# Patient Record
Sex: FEMALE | Race: WHITE | Employment: OTHER | URBAN - METROPOLITAN AREA
[De-identification: names, ages, dates, MRNs, and addresses within clinical notes are randomized per-mention and may not be internally consistent; named-entity substitution may affect disease eponyms.]

---

## 2017-01-30 DIAGNOSIS — I10 ESSENTIAL HYPERTENSION, BENIGN: Primary | ICD-10-CM

## 2017-01-30 RX ORDER — LOSARTAN POTASSIUM AND HYDROCHLOROTHIAZIDE 25; 100 MG/1; MG/1
1 TABLET ORAL DAILY
Qty: 90 TAB | Refills: 1 | Status: SHIPPED | OUTPATIENT
Start: 2017-01-30 | End: 2017-07-14 | Stop reason: SDUPTHER

## 2017-02-22 RX ORDER — METFORMIN HYDROCHLORIDE 500 MG/1
500 TABLET ORAL 2 TIMES DAILY WITH MEALS
Qty: 60 TAB | Refills: 2 | Status: SHIPPED | OUTPATIENT
Start: 2017-02-22 | End: 2017-05-23

## 2017-03-16 ENCOUNTER — OFFICE VISIT (OUTPATIENT)
Dept: SLEEP MEDICINE | Age: 67
End: 2017-03-16

## 2017-03-16 VITALS
WEIGHT: 199 LBS | DIASTOLIC BLOOD PRESSURE: 93 MMHG | HEIGHT: 64 IN | OXYGEN SATURATION: 97 % | SYSTOLIC BLOOD PRESSURE: 158 MMHG | BODY MASS INDEX: 33.97 KG/M2 | HEART RATE: 68 BPM

## 2017-03-16 DIAGNOSIS — R63.5 WEIGHT GAIN: ICD-10-CM

## 2017-03-16 DIAGNOSIS — G47.33 OSA (OBSTRUCTIVE SLEEP APNEA): Primary | ICD-10-CM

## 2017-03-16 DIAGNOSIS — I10 ESSENTIAL HYPERTENSION, BENIGN: ICD-10-CM

## 2017-03-16 NOTE — PROGRESS NOTES
[address] [place]. [place], [address]  Tel.  [phone]  Fax. 100 [facility] 60  [place], [address] [place]  Tel.  [phone]  Fax. [phone] [address] [facility] 3 Mushtaq Ruiz   Tel.  [phone]  Fax. [phone]     S>Cira Perez is a 77 y.o. female seen for a positive airway pressure follow-up. She reports no problems using the device. She is 100% compliant over the past 30 days. The following problems are identified:    Drowsiness no Problems exhaling no   Snoring no Forget to put on no   Mask Comfortable yes Can't fall asleep no   Dry Mouth no Mask falls off no   Air Leaking no Frequent awakenings yes       She admits that her sleep has worsened she has been waking up to use the restroom and then has difficulty returning to sleep. She has gained weight in the past year. Allergies   Allergen Reactions    Codeine Nausea Only     GI    Macrodantin [Nitrofurantoin Macrocrystalline] Itching    Tenoretic 100 [Atenolol-Chlorthalidone] Other (comments)     fatigue    Zocor [Simvastatin] Swelling       She has a current medication list which includes the following prescription(s): metformin, losartan-hydrochlorothiazide, red yeast rice extract, cholecalciferol, cranberry conc/ascorbic acid, multivitamin, stress formula, flaxseed oil, calcium, and docosahexanoic acid/epa. .      She  has a past medical history of Esophageal reflux (4/13/2010); Essential hypertension, benign (4/13/2010); Mixed hyperlipidemia (4/13/2010); Obesity, unspecified (4/13/2010); and CINTHIA (obstructive sleep apnea) (08-). [place] Sleepiness Score: 2   and Modified F.O.S.Q. Score Total / 2: 18.5   which reflect improved sleep quality over therapy time.     O>    Visit Vitals    BP (!) 158/93    Pulse 68    Ht 5' 4\" (1.626 m)    Wt 199 lb (90.3 kg)    SpO2 97%    BMI 34.16 kg/m2         General:   Not in acute distress   Eyes:  Anicteric sclerae, no obvious strabismus   Nose:  No obvious nasal septum deviation    Oropharynx:   Class 4 oropharyngeal outlet, thick tongue base, uvula not seen due to low-lying soft palate, narrow tonsilo-pharyngeal pilars   Tonsils:   tonsils are not visualized due to low-lying soft palate   Neck:   midline trachea   Chest/Lungs:  Equal lung expansion, clear on auscultation    CVS:  Normal rate, regular rhythm; no JVD   Skin:  Warm to touch; no obvious rashes   Neuro:  No focal deficits ; no obvious tremor    Psych:  Normal affect,  normal countenance;           A>    ICD-10-CM ICD-9-CM    1. CINTHIA (obstructive sleep apnea) G47.33 327.23 SPLIT CPAP/PSG   2. Essential hypertension, benign I10 401.1    3. BMI 34.0-34.9,adult Z68.34 V85.34    4. Weight gain R63.5 783.1         AHI = 5.6. On CPAP : 9 cmH2O. Compliant:      yes    Therapeutic Response:  Negative    P>  * Sleep testing due to interim weight gain. * We have recommended a dedicated weight loss through appropriate diet and an exercise regiment as significant weight reduction has been shown to reduce severity of obstructive sleep apnea. * Follow-up Disposition:  Return if symptoms worsen or fail to improve. New device to be prescribed after testing. * She was asked to contact our office for any problems regarding PAP therapy. * Counseling was provided regarding the importance of regular PAP use and on proper sleep hygiene and safe driving. * Re-enforced proper and regular cleaning for the device. Thank you for allowing us to participate in your patient's medical care. Quintin Jones MD, FAASM  Diplomate American Board of Sleep Medicine  Diplomate in Sleep Medicine - ABP  Electronically signed.

## 2017-03-16 NOTE — PATIENT INSTRUCTIONS
[address] [facility] [place]. [place], [address]  Tel.  [phone]  Fax. 100 [facility] 60  [place], [address] [facility] [place]  Tel.  [phone]  Fax. [phone] [address] Mushtaq Ruiz  Tel.  [phone]  Fax. [phone]     Learning About CPAP for Sleep Apnea  What is CPAP? CPAP is a small machine that you use at home every night while you sleep. It increases air pressure in your throat to keep your airway open. When you have sleep apnea, this can help you sleep better so you feel much better. CPAP stands for \"continuous positive airway pressure. \"  The CPAP machine will have one of the following:  · A mask that covers your nose and mouth  · Prongs that fit into your nose  · A mask that covers your nose only, the most common type. This type is called NCPAP. The N stands for \"nasal.\"  Why is it done? CPAP is usually the best treatment for obstructive sleep apnea. It is the first treatment choice and the most widely used. Your doctor may suggest CPAP if you have:  · Moderate to severe sleep apnea. · Sleep apnea and coronary artery disease (CAD) or heart failure. How does it help? · CPAP can help you have more normal sleep, so you feel less sleepy and more alert during the daytime. · CPAP may help keep heart failure or other heart problems from getting worse. · NCPAP may help lower your blood pressure. · If you use CPAP, your bed partner may also sleep better because you are not snoring or restless. What are the side effects? Some people who use CPAP have:  · A dry or stuffy nose and a sore throat. · Irritated skin on the face. · Sore eyes. · Bloating. If you have any of these problems, work with your doctor to fix them. Here are some things you can try:  · Be sure the mask or nasal prongs fit well. · See if your doctor can adjust the pressure of your CPAP. · If your nose is dry, try a humidifier.   · If your nose is runny or stuffy, try decongestant medicine or a steroid nasal spray. If these things do not help, you might try a different type of machine. Some machines have air pressure that adjusts on its own. Others have air pressures that are different when you breathe in than when you breathe out. This may reduce discomfort caused by too much pressure in your nose. Where can you learn more? Go to Circalit.be  Enter Kenny Judge in the search box to learn more about \"Learning About CPAP for Sleep Apnea. \"   © 1739-2452 Healthwise, Incorporated. Care instructions adapted under license by [employer] (which disclaims liability or warranty for this information). This care instruction is for use with your licensed healthcare professional. If you have questions about a medical condition or this instruction, always ask your healthcare professional. [address] any warranty or liability for your use of this information. Content Version: 4.9.43439; Last Revised: January 11, 2010  PROPER SLEEP HYGIENE    What to avoid  · Do not have drinks with caffeine, such as coffee or black tea, for 8 hours before bed. · Do not smoke or use other types of tobacco near bedtime. Nicotine is a stimulant and can keep you awake. · Avoid drinking alcohol late in the evening, because it can cause you to wake in the middle of the night. · Do not eat a big meal close to bedtime. If you are hungry, eat a light snack. · Do not drink a lot of water close to bedtime, because the need to urinate may wake you up during the night. · Do not read or watch TV in bed. Use the bed only for sleeping and sexual activity. What to try  · Go to bed at the same time every night, and wake up at the same time every morning. Do not take naps during the day. · Keep your bedroom quiet, dark, and cool. · Get regular exercise, but not within 3 to 4 hours of your bedtime. .  · Sleep on a comfortable pillow and mattress.   · If watching the clock makes you anxious, turn it facing away from you so you cannot see the time. · If you worry when you lie down, start a worry book. Well before bedtime, write down your worries, and then set the book and your concerns aside. · Try meditation or other relaxation techniques before you go to bed. · If you cannot fall asleep, get up and go to another room until you feel sleepy. Do something relaxing. Repeat your bedtime routine before you go to bed again. · Make your house quiet and calm about an hour before bedtime. Turn down the lights, turn off the TV, log off the computer, and turn down the volume on music. This can help you relax after a busy day. Drowsy Driving: The [facility] 54 cites drowsiness as a causing factor in more than 045,056 police reported crashes annually, resulting in 76,000 injuries and 1,500 deaths. Other surveys suggest 55% of people polled have driven while drowsy in the past year, 23% had fallen asleep but not crashed, 3% crashed, and 2% had and accident due to drowsy driving. Who is at risk? Young Drivers: One study of drowsy driving accidents states that 55% of the drivers were under 25 years. Of those, 75% were male. Shift Workers and Travelers: People who work overnight or travel across time zones frequently are at higher risk of experiencing Circadian Rhythm Disorders. They are trying to work and function when their body is programed to sleep. Sleep Deprived: Lack of sleep has a serious impact on your ability to pay attention or focus on a task. Consistently getting less than the average of 8 hours your body needs creates partial or cumulative sleep deprivation. Untreated Sleep Disorders: Sleep Apnea, Narcolepsy, R.L.S., and other sleep disorders (untreated) prevent a person from getting enough restful sleep. This leads to excessive daytime sleepiness and increases the risk for drowsy driving accidents by up to 7 times.   Medications / Alcohol: Even over the counter medications can cause drowsiness. Medications that impair a drivers attention should have a warning label. Alcohol naturally makes you sleepy and on its own can cause accidents. Combined with excessive drowsiness its effects are amplified. Signs of Drowsy Driving:   * You don't remember driving the last few miles   * You may drift out of your kylah   * You are unable to focus and your thoughts wander   * You may yawn more often than normal   * You have difficulty keeping your eyes open / nodding off   * Missing traffic signs, speeding, or tailgating  Prevention-   Good sleep hygiene, lifestyle and behavioral choices have the most impact on drowsy driving. There is no substitute for sleep and the average person requires 8 hours nightly. If you find yourself driving drowsy, stop and sleep. Consider the sleep hygiene tips provided during your visit as well. Medication Refill Policy: Refills for all medications require 1 week advance notice. Please have your pharmacy fax a refill request. We are unable to fax, or call in \"controled substance\" medications and you will need to pick these prescriptions up from our office. Chippmunk Activation    Thank you for requesting access to Chippmunk. Please follow the instructions below to securely access and download your online medical record. Chippmunk allows you to send messages to your doctor, view your test results, renew your prescriptions, schedule appointments, and more. How Do I Sign Up? 1. In your internet browser, go to [email or web address]. 2. Click on the First Time User? Click Here link in the Sign In box. You will see the New Member Sign Up page. 3. Enter your Chippmunk Access Code exactly as it appears below. You will not need to use this code after youve completed the sign-up process. If you do not sign up before the expiration date, you must request a new code. Chippmunk Access Code:  Activation code not generated  Current Donde Status: Active (This is the date your Donde access code will )    4. Enter the last four digits of your Social Security Number (xxxx) and Date of Birth (mm/dd/yyyy) as indicated and click Submit. You will be taken to the next sign-up page. 5. Create a TickPickt ID. This will be your Donde login ID and cannot be changed, so think of one that is secure and easy to remember. 6. Create a Donde password. You can change your password at any time. 7. Enter your Password Reset Question and Answer. This can be used at a later time if you forget your password. 8. Enter your e-mail address. You will receive e-mail notification when new information is available in [address] 9. Click Sign Up. You can now view and download portions of your medical record. 10. Click the Download Summary menu link to download a portable copy of your medical information. Additional Information    If you have questions, please call [phone]. Remember, Donde is NOT to be used for urgent needs. For medical emergencies, dial 911.

## 2017-06-16 ENCOUNTER — HOSPITAL ENCOUNTER (OUTPATIENT)
Dept: SLEEP MEDICINE | Age: 67
Discharge: HOME | End: 2017-06-16
Payer: MEDICARE

## 2017-06-16 VITALS
SYSTOLIC BLOOD PRESSURE: 160 MMHG | BODY MASS INDEX: 33.97 KG/M2 | DIASTOLIC BLOOD PRESSURE: 99 MMHG | OXYGEN SATURATION: 99 % | TEMPERATURE: 97.3 F | WEIGHT: 199 LBS | HEIGHT: 64 IN | HEART RATE: 62 BPM

## 2017-06-16 DIAGNOSIS — G47.33 OSA (OBSTRUCTIVE SLEEP APNEA): ICD-10-CM

## 2017-06-16 PROCEDURE — 95810 POLYSOM 6/> YRS 4/> PARAM: CPT

## 2017-06-20 ENCOUNTER — TELEPHONE (OUTPATIENT)
Dept: SLEEP MEDICINE | Age: 67
End: 2017-06-20

## 2017-06-20 DIAGNOSIS — G47.33 SLEEP APNEA, OBSTRUCTIVE: Primary | ICD-10-CM

## 2017-06-21 NOTE — TELEPHONE ENCOUNTER
Results of Sleep Testing reviewed with patient who indicated that she had used her CPAP Device on the night prior to sleep testing. He sleep during the test was not restful due to her waking up with snoring and snorting. She did agree to holding off on CPAP therapy for a week prior to a HSAT. Encounter Diagnosis   Name Primary?     Sleep apnea, obstructive Yes     Orders Placed This Encounter    SLEEP STUDY UNATTENDED, 4 CHANNEL     Order Specific Question:   Reason for Exam     Answer:   CINTHIA

## 2017-06-28 NOTE — TELEPHONE ENCOUNTER
Patient said it will be very hard for her to not use CPAP, but she will try so that she can do repeat HST (has been scheduled for 7/6/17.

## 2017-07-06 ENCOUNTER — OFFICE VISIT (OUTPATIENT)
Dept: SLEEP MEDICINE | Age: 67
End: 2017-07-06

## 2017-07-06 ENCOUNTER — HOSPITAL ENCOUNTER (OUTPATIENT)
Dept: SLEEP MEDICINE | Age: 67
Discharge: HOME | End: 2017-07-06
Payer: MEDICARE

## 2017-07-06 VITALS
DIASTOLIC BLOOD PRESSURE: 90 MMHG | HEART RATE: 78 BPM | OXYGEN SATURATION: 96 % | BODY MASS INDEX: 33.97 KG/M2 | SYSTOLIC BLOOD PRESSURE: 160 MMHG | WEIGHT: 199 LBS | HEIGHT: 64 IN

## 2017-07-06 DIAGNOSIS — G47.33 OSA (OBSTRUCTIVE SLEEP APNEA): Primary | ICD-10-CM

## 2017-07-06 PROCEDURE — 95806 SLEEP STUDY UNATT&RESP EFFT: CPT

## 2017-07-06 NOTE — PROGRESS NOTES
[address] [facility] [address] [place]. [place], [address]  Tel.  [phone]  Fax. 100 [facility] 60  [place] [facility], [address]  Tel.  [phone]  Fax. [phone] [address] [facility] [place] [address]  Tel.  [phone]  Fax. [phone]       S>Cira Marin is a 77 y.o. female seen today to receive a home sleep testing unit (HST). · Patient was educated on proper hookup and operation of the HST. · Instruction forms and documentation were reviewed and signed. · The patient demonstrated good understanding of the HST. O>    Visit Vitals    /90    Pulse 78    Ht 5' 4\" (1.626 m)    Wt 199 lb (90.3 kg)    SpO2 96%    BMI 34.16 kg/m2         A>  No diagnosis found. P>  · General information regarding operations and maintenance of the device was provided. · She was provided information on sleep apnea including coresponding risk factors and the importance of proper treatment. · Follow-up appointment was made to return the HST. She will be contacted once the results have been reviewed. · She was asked to contact our office for any problems regarding her home sleep test study.

## 2017-07-07 ENCOUNTER — TELEPHONE (OUTPATIENT)
Dept: SLEEP MEDICINE | Age: 67
End: 2017-07-07

## 2017-07-07 NOTE — TELEPHONE ENCOUNTER
HSAT Returned    Date of Study: 7/6/2017    The following information was gathered from the patients study log:    · Lights off: 9:30 PM  · Estimated sleep onset: 10 PM    · Awakened a total of 3 times  · The patient felt they slept 7 hours  · Patient took sleep aid before starting the test  · Sleep quality was same compared to a usual nights sleep. Further information provided: Not used CPAP for 8 nights.

## 2017-07-07 NOTE — TELEPHONE ENCOUNTER
Results of Sleep Testing reviewed with patient who indicated that she has become very tired and snores a lot since discontinuation of CPAP therapy. She has not had a face to face evaluation since she has been on Medicare and is willing to come in for an evaluation.

## 2017-07-14 ENCOUNTER — OFFICE VISIT (OUTPATIENT)
Dept: INTERNAL MEDICINE CLINIC | Age: 67
End: 2017-07-14

## 2017-07-14 VITALS
HEART RATE: 70 BPM | WEIGHT: 200.2 LBS | RESPIRATION RATE: 15 BRPM | OXYGEN SATURATION: 98 % | SYSTOLIC BLOOD PRESSURE: 136 MMHG | BODY MASS INDEX: 34.18 KG/M2 | DIASTOLIC BLOOD PRESSURE: 76 MMHG | TEMPERATURE: 98.1 F | HEIGHT: 64 IN

## 2017-07-14 DIAGNOSIS — G47.30 INSOMNIA WITH SLEEP APNEA, UNSPECIFIED: ICD-10-CM

## 2017-07-14 DIAGNOSIS — E66.9 OBESITY (BMI 30.0-34.9): ICD-10-CM

## 2017-07-14 DIAGNOSIS — Z12.11 SCREENING FOR COLON CANCER: ICD-10-CM

## 2017-07-14 DIAGNOSIS — G47.00 INSOMNIA WITH SLEEP APNEA, UNSPECIFIED: ICD-10-CM

## 2017-07-14 DIAGNOSIS — I10 ESSENTIAL HYPERTENSION, BENIGN: ICD-10-CM

## 2017-07-14 DIAGNOSIS — E11.9 CONTROLLED TYPE 2 DIABETES MELLITUS WITHOUT COMPLICATION, WITHOUT LONG-TERM CURRENT USE OF INSULIN (HCC): Primary | ICD-10-CM

## 2017-07-14 RX ORDER — METFORMIN HYDROCHLORIDE 500 MG/1
TABLET ORAL
COMMUNITY
Start: 2017-06-19 | End: 2017-07-14 | Stop reason: SDUPTHER

## 2017-07-14 RX ORDER — LOSARTAN POTASSIUM AND HYDROCHLOROTHIAZIDE 25; 100 MG/1; MG/1
1 TABLET ORAL DAILY
Qty: 90 TAB | Refills: 1 | Status: SHIPPED | OUTPATIENT
Start: 2017-07-14 | End: 2018-05-21 | Stop reason: SDUPTHER

## 2017-07-14 RX ORDER — METFORMIN HYDROCHLORIDE 500 MG/1
500 TABLET ORAL
Qty: 90 TAB | Refills: 0 | Status: SHIPPED | OUTPATIENT
Start: 2017-07-14 | End: 2017-09-12 | Stop reason: SDUPTHER

## 2017-07-14 NOTE — PROGRESS NOTES
Chief Complaint   Patient presents with    Hypertension    Diabetes       1. Have you been to the ER, urgent care clinic since your last visit? Hospitalized since your last visit? No    2. Have you seen or consulted any other health care providers outside of the [address] [place] [address] since your last visit? Include any pap smears or colon screening. No    Body mass index is 34.36 kg/(m^2).

## 2017-07-14 NOTE — MR AVS SNAPSHOT
Visit Information Date & Time Provider Department Dept. Phone Encounter #  
 7/14/2017 10:30 AM Mary Avelar MD [facility] [phone] [id number] Your Appointments 8/1/2017  4:40 PM  
Any with Billie Esparza MD  
[address] [facility] ([facility] Appt Note: face to face follow up for Medicare; face to face follow up for Medicare [place] [address] [place] [address]  
  
   
 [address] [facility] [address] 9/13/2017  9:00 AM  
PHYSICAL PRE OP with Mary Avelar MD  
[facility] Appt Note: [employer] [address] [facility] [address] [facility] [address] [address]  
101 [facility] 3100 [facility] [address] [address] Upcoming Health Maintenance Date Due COLONOSCOPY 11/8/1968 GLAUCOMA SCREENING Q2Y 11/8/2015 Pneumococcal 65+ Low/Medium Risk (2 of 2 - PPSV23) 11/8/2015 MEDICARE YEARLY EXAM 11/8/2015 INFLUENZA AGE 9 TO ADULT 8/1/2017 BREAST CANCER SCRN MAMMOGRAM 10/6/2018 DTaP/Tdap/Td series (2 - Td) 8/3/2026 Allergies as of 7/14/2017  Review Complete On: 7/14/2017 By: aMry Avelar MD  
  
 Severity Noted Reaction Type Reactions Codeine  04/13/2010    Nausea Only GI Macrodantin [Nitrofurantoin Macrocrystalline]  10/03/2002    Itching Tenoretic 100 [Atenolol-chlorthalidone]  04/13/2010    Other (comments)  
 fatigue Zocor [Simvastatin]  04/13/2010    Swelling Current Immunizations  Never Reviewed Name Date Tdap 8/3/2016 Not reviewed this visit You Were Diagnosed With   
  
 Codes Comments Controlled type 2 diabetes mellitus without complication, without long-term current use of insulin ([facility]    -  Primary ICD-10-CM: E11.9 ICD-9-CM: 250.00 Essential hypertension, benign     ICD-10-CM: I10 
ICD-9-CM: 401.1 Insomnia with sleep apnea, unspecified     ICD-10-CM: G47.00, G47.30 ICD-9-CM: 780.51 Obesity (BMI 30.0-34.9)     ICD-10-CM: N45.2 ICD-9-CM: 278.00 Screening for colon cancer     ICD-10-CM: Z12.11 ICD-9-CM: V76.51 Vitals BP Pulse Temp Resp Height(growth percentile) Weight(growth percentile) 136/76 (BP 1 Location: Left arm, BP Patient Position: Sitting) 70 98.1 °F (36.7 °C) (Oral) 15 5' 4\" (1.626 m) 200 lb 3.2 oz (90.8 kg) SpO2 BMI OB Status Smoking Status 98% 34.36 kg/m2 Postmenopausal Never Smoker Vitals History BMI and BSA Data Body Mass Index Body Surface Area  
 34.36 kg/m 2 2.02 m 2 Preferred Pharmacy Pharmacy Name Phone [id number] [place] [address] [phone] Your Updated Medication List  
  
   
This list is accurate as of: 7/14/17 11:04 AM.  Always use your most recent med list.  
  
  
  
  
 CALCIUM PO Take 500 mg by mouth two (2) times a day. CRANBERRY CONC-ASCORBIC ACID PO Take  by mouth. FISH OIL PO Take 1,000 mg by mouth daily. 2 tabs qd FLAXSEED OIL PO Take  by mouth.  
  
 losartan-hydroCHLOROthiazide 100-25 mg per tablet Commonly known as:  HYZAAR Take 1 Tab by mouth daily. metFORMIN 500 mg tablet Commonly known as:  GLUCOPHAGE Take 1 Tab by mouth daily (with breakfast) for 90 days. multivitamin, stress formula tablet Commonly known as:  STRESS TAB Take 1 Tab by mouth daily. red yeast rice extract 600 mg Cap Take 600 mg by mouth now. VITAMIN D3 1,000 unit Cap Generic drug:  cholecalciferol Take  by mouth. Prescriptions Sent to Pharmacy Refills  
 losartan-hydroCHLOROthiazide (HYZAAR) 100-25 mg per tablet 1 Sig: Take 1 Tab by mouth daily. Class: Normal  
 Pharmacy: [address] #: [phone]  Route: Oral  
 metFORMIN (GLUCOPHAGE) 500 mg tablet 0 Sig: Take 1 Tab by mouth daily (with breakfast) for 90 days. Class: Normal  
 Pharmacy: [facility] Ph #: [phone] Route: Oral  
  
We Performed the Following OCCULT BLOOD, IMMUNOASSAY (FIT) [id number] CPT(R)] Introducing \[facility]\"" & HEALTH SERVICES! Dear Julissa Beltrán: 
Thank you for requesting a "[employer] account. Our records indicate that you already have an active "[employer] account. You can access your account anytime at [email or web address] Did you know that you can access your hospital and ER discharge instructions at any time in "[employer]? You can also review all of your test results from your hospital stay or ER visit. Additional Information If you have questions, please visit the Frequently Asked Questions section of the "[employer] website at [email or web address] Remember, "[employer] is NOT to be used for urgent needs. For medical emergencies, dial 911. Now available from your iPhone and Android! Please provide this summary of care documentation to your next provider. Your primary care clinician is listed as Constantino Mckeon. If you have any questions after today's visit, please call [phone].

## 2017-07-14 NOTE — PROGRESS NOTES
Written by Aysha Manuel, as dictated by Dr. Aria Cruz MD.    Dedra Villa is a 77 y.o. female. HPI  The patient comes in today for a follow-up. She is compliant on metformin one a day and trying to lose weight. She has joined [employer] [facility] [employer] and is trying to go every weekday, but she doesn't feel like she has lost weight yet. She has not had a colonoscopy. Her BMs are normal and she denies leg swelling. Her BP is elevated in the office today at 148/72, rechecked at 136/70. She has not been checking her BP at home as she thinks her home cuff is giving her inconsistent readings. She has not been sleeping well recently. She went for a sleep study, which did not indicate sleep apnea, though she has been using CPAP for years. She tried no CPAP for a week and snored, and did a home test which was still negative for sleep apnea. Dr. Felipe Red (sleep study) recommended a dedicated weight loss regimen. She needs a replacement machine but is not able to get one through her insurance. Patient Active Problem List   Diagnosis Code    Essential hypertension, benign I10    Mixed hyperlipidemia E78.2    Esophageal reflux K21.9    Obesity, unspecified E66.9    Sleep apnea, obstructive G47.33        Current Outpatient Prescriptions on File Prior to Visit   Medication Sig Dispense Refill    red yeast rice extract 600 mg cap Take 600 mg by mouth now.  Cholecalciferol, Vitamin D3, (VITAMIN D) 1,000 unit Cap Take  by mouth.  VIT C/VITAMIN E ACETATE/CRANB (CRANBERRY CONC-ASCORBIC ACID PO) Take  by mouth.  multivitamin, stress formula (STRESS TAB) tablet Take 1 Tab by mouth daily.  FLAXSEED OIL PO Take  by mouth.  CALCIUM PO Take 500 mg by mouth two (2) times a day.  DOCOSAHEXANOIC ACID/EPA (FISH OIL PO) Take 1,000 mg by mouth daily. 2 tabs qd       No current facility-administered medications on file prior to visit.         Allergies   Allergen Reactions    Codeine Nausea Only     GI    Macrodantin [Nitrofurantoin Macrocrystalline] Itching    Tenoretic 100 [Atenolol-Chlorthalidone] Other (comments)     fatigue    Zocor [Simvastatin] Swelling       Past Medical History:   Diagnosis Date    Esophageal reflux 4/13/2010    Essential hypertension, benign 4/13/2010    Mixed hyperlipidemia 4/13/2010    Obesity, unspecified 4/13/2010    CINTHIA (obstructive sleep apnea) 08-    AHI: 5.6 per hour       Past Surgical History:   Procedure Laterality Date    BREAST SURGERY PROCEDURE UNLISTED      breast biopsy    HX GYN      HX TUBAL LIGATION         Family History   Problem Relation Age of Onset    Cancer Mother      breast    Cancer Brother      leukemia    Cancer Sister        Social History     Social History    Marital status:      Spouse name: N/A    Number of children: N/A    Years of education: N/A     Occupational History    Not on file. Social History Main Topics    Smoking status: Never Smoker    Smokeless tobacco: Never Used    Alcohol use No    Drug use: No    Sexual activity: Not Currently     Other Topics Concern     Service No    Blood Transfusions No    Caffeine Concern No     2 cups of coffee daily    Occupational Exposure No    Hobby Hazards No    Sleep Concern No    Stress Concern No    Weight Concern Yes    Special Diet No    Back Care Yes    Exercise No    Bike Helmet No    Seat Belt Yes    Self-Exams No     Social History Narrative       No visits with results within 3 Month(s) from this visit. Latest known visit with results is:    Office Visit on 08/03/2016   Component Date Value Ref Range Status    Cholesterol, total 08/03/2016 187  100 - 199 mg/dL Final    Triglyceride 08/03/2016 67  0 - 149 mg/dL Final    HDL Cholesterol 08/03/2016 74  >39 mg/dL Final    Comment: According to ATP-III Guidelines, HDL-C >59 mg/dL is considered a  negative risk factor for CHD.       VLDL, calculated 08/03/2016 13  5 - 40 mg/dL Final    LDL, calculated 08/03/2016 100* 0 - 99 mg/dL Final    WBC 08/03/2016 4.5  3.4 - 10.8 x10E3/uL Final    RBC 08/03/2016 4.32  3.77 - 5.28 x10E6/uL Final    HGB 08/03/2016 13.4  11.1 - 15.9 g/dL Final    HCT 08/03/2016 40.0  34.0 - 46.6 % Final    MCV 08/03/2016 93  79 - 97 fL Final    MCH 08/03/2016 31.0  26.6 - 33.0 pg Final    MCHC 08/03/2016 33.5  31.5 - 35.7 g/dL Final    RDW 08/03/2016 13.7  12.3 - 15.4 % Final    PLATELET 90/72/6657 397  150 - 379 x10E3/uL Final    Glucose 08/03/2016 99  65 - 99 mg/dL Final    BUN 08/03/2016 18  8 - 27 mg/dL Final    Creatinine 08/03/2016 0.73  0.57 - 1.00 mg/dL Final    GFR est non-AA 08/03/2016 87  >59 mL/min/1.73 Final    GFR est AA 08/03/2016 100  >59 mL/min/1.73 Final    BUN/Creatinine ratio 08/03/2016 25  11 - 26 Final    Sodium 08/03/2016 141  134 - 144 mmol/L Final    Potassium 08/03/2016 4.3  3.5 - 5.2 mmol/L Final    Chloride 08/03/2016 98  97 - 108 mmol/L Final    CO2 08/03/2016 26  18 - 29 mmol/L Final    Calcium 08/03/2016 9.7  8.7 - 10.3 mg/dL Final    Protein, total 08/03/2016 6.5  6.0 - 8.5 g/dL Final    Albumin 08/03/2016 4.5  3.6 - 4.8 g/dL Final    GLOBULIN, TOTAL 08/03/2016 2.0  1.5 - 4.5 g/dL Final    A-G Ratio 08/03/2016 2.3  1.1 - 2.5 Final    Bilirubin, total 08/03/2016 0.5  0.0 - 1.2 mg/dL Final    Alk. phosphatase 08/03/2016 56  39 - 117 IU/L Final    AST (SGOT) 08/03/2016 13  0 - 40 IU/L Final    ALT (SGPT) 08/03/2016 13  0 - 32 IU/L Final    TSH 08/03/2016 1.970  0.450 - 4.500 uIU/mL Final    Hemoglobin A1c 08/03/2016 5.8* 4.8 - 5.6 % Final    Comment:          Pre-diabetes: 5.7 - 6.4           Diabetes: >6.4           Glycemic control for adults with diabetes: <7.0      Estimated average glucose 08/03/2016 120  mg/dL Final    INTERPRETATION 08/03/2016 Note   Final    Supplement report is available. Review of Systems   Constitutional: Negative for malaise/fatigue. HENT: Negative for congestion. Eyes: Negative for blurred vision and double vision. Respiratory: Negative for cough and shortness of breath. Gastrointestinal: Negative for abdominal pain and heartburn. Genitourinary: Negative for frequency and urgency. Musculoskeletal: Negative for joint pain and myalgias. Neurological: Negative for dizziness, sensory change, weakness and headaches. Visit Vitals    /76 (BP 1 Location: Left arm, BP Patient Position: Sitting)    Pulse 70    Temp 98.1 °F (36.7 °C) (Oral)    Resp 15    Ht 5' 4\" (1.626 m)    Wt 200 lb 3.2 oz (90.8 kg)    SpO2 98%    BMI 34.36 kg/m2       Physical Exam   Constitutional: She is oriented to person, place, and time. She appears well-developed. No distress. Obese   HENT:   Right Ear: External ear normal.   Left Ear: External ear normal.   Eyes: Conjunctivae and EOM are normal. Right eye exhibits no discharge. Left eye exhibits no discharge. Neck: Normal range of motion. Neck supple. Cardiovascular: Normal rate and regular rhythm. Pulmonary/Chest: Effort normal and breath sounds normal. She has no wheezes. Abdominal: Soft. Bowel sounds are normal. There is no tenderness. Lymphadenopathy:     She has cervical adenopathy. Neurological: She is alert and oriented to person, place, and time. Skin: She is not diaphoretic. Psychiatric: She has a normal mood and affect. Her behavior is normal.   Nursing note and vitals reviewed. ASSESSMENT and PLAN    ICD-10-CM ICD-9-CM    1. Controlled type 2 diabetes mellitus without complication, without long-term current use of insulin ([facility]) E11.9 250.00 metFORMIN (GLUCOPHAGE) 500 mg tablet sent to pharmacy    Pt is doing well on current meds. No change. 2. Essential hypertension, benign I10 401.1 losartan-hydroCHLOROthiazide (HYZAAR) 100-25 mg per tablet sent to pharmacy    Pt is doing well on current meds. No change.    3. Insomnia with sleep apnea, unspecified G47.00 780.51   I recommended Benadryl, Tylenol/Advil PM, melatonin, or concentrated cherry juice to help her sleep. She should continue to follow with her sleep doctor. G47.30     4. Obesity (BMI 30.0-34. 9) E66.9 278.00 I want her to continue trying to lose weight. 5. Screening for colon cancer Z12.11 V76.51 OCCULT BLOOD, IMMUNOASSAY (FIT)    I want her to get a stool sample tested before she gets her colonoscopy done. I do want her to get her colonoscopy done. This plan was reviewed with the patient and patient agrees. All questions were answered. This scribe documentation was reviewed by me and accurately reflects the examination and decisions made by me. This note will not be viewable in [address]

## 2017-08-01 ENCOUNTER — OFFICE VISIT (OUTPATIENT)
Dept: SLEEP MEDICINE | Age: 67
End: 2017-08-01

## 2017-08-01 VITALS
SYSTOLIC BLOOD PRESSURE: 130 MMHG | DIASTOLIC BLOOD PRESSURE: 70 MMHG | HEART RATE: 73 BPM | HEIGHT: 64 IN | BODY MASS INDEX: 34.66 KG/M2 | OXYGEN SATURATION: 96 % | WEIGHT: 203 LBS

## 2017-08-01 DIAGNOSIS — I10 ESSENTIAL HYPERTENSION, BENIGN: ICD-10-CM

## 2017-08-01 DIAGNOSIS — G47.33 OSA (OBSTRUCTIVE SLEEP APNEA): Primary | ICD-10-CM

## 2017-08-01 NOTE — PATIENT INSTRUCTIONS
[address] [facility] [place]. [place], [address]  Tel.  [phone]  Fax. [address] [facility] [address]  [place], [address] [facility]  Tel.  [phone]  Fax. [phone] [address] [address] [address]  Tel.  [phone]  Fax. [phone]     Learning About CPAP for Sleep Apnea  What is CPAP? CPAP is a small machine that you use at home every night while you sleep. It increases air pressure in your throat to keep your airway open. When you have sleep apnea, this can help you sleep better so you feel much better. CPAP stands for \"continuous positive airway pressure. \"  The CPAP machine will have one of the following:  · A mask that covers your nose and mouth  · Prongs that fit into your nose  · A mask that covers your nose only, the most common type. This type is called NCPAP. The N stands for \"nasal.\"  Why is it done? CPAP is usually the best treatment for obstructive sleep apnea. It is the first treatment choice and the most widely used. Your doctor may suggest CPAP if you have:  · Moderate to severe sleep apnea. · Sleep apnea and coronary artery disease (CAD) or heart failure. How does it help? · CPAP can help you have more normal sleep, so you feel less sleepy and more alert during the daytime. · CPAP may help keep heart failure or other heart problems from getting worse. · NCPAP may help lower your blood pressure. · If you use CPAP, your bed partner may also sleep better because you are not snoring or restless. What are the side effects? Some people who use CPAP have:  · A dry or stuffy nose and a sore throat. · Irritated skin on the face. · Sore eyes. · Bloating. If you have any of these problems, work with your doctor to fix them. Here are some things you can try:  · Be sure the mask or nasal prongs fit well. · See if your doctor can adjust the pressure of your CPAP. · If your nose is dry, try a humidifier.   · If your nose is runny or stuffy, try decongestant medicine or a steroid nasal spray. If these things do not help, you might try a different type of machine. Some machines have air pressure that adjusts on its own. Others have air pressures that are different when you breathe in than when you breathe out. This may reduce discomfort caused by too much pressure in your nose. Where can you learn more? Go to [employer]  Enter Killian Cornea in the search box to learn more about \"Learning About CPAP for Sleep Apnea. \"   © 1593-6234 Healthwise, [employer]. Care instructions adapted under license by Charley Calabrese (which disclaims liability or warranty for this information). This care instruction is for use with your licensed healthcare professional. If you have questions about a medical condition or this instruction, always ask your healthcare professional. [address] any warranty or liability for your use of this information. Content Version: 2.2.45897; Last Revised: January 11, 2010  PROPER SLEEP HYGIENE    What to avoid  · Do not have drinks with caffeine, such as coffee or black tea, for 8 hours before bed. · Do not smoke or use other types of tobacco near bedtime. Nicotine is a stimulant and can keep you awake. · Avoid drinking alcohol late in the evening, because it can cause you to wake in the middle of the night. · Do not eat a big meal close to bedtime. If you are hungry, eat a light snack. · Do not drink a lot of water close to bedtime, because the need to urinate may wake you up during the night. · Do not read or watch TV in bed. Use the bed only for sleeping and sexual activity. What to try  · Go to bed at the same time every night, and wake up at the same time every morning. Do not take naps during the day. · Keep your bedroom quiet, dark, and cool. · Get regular exercise, but not within 3 to 4 hours of your bedtime. .  · Sleep on a comfortable pillow and mattress.   · If watching the clock makes you anxious, turn it facing away from you so you cannot see the time. · If you worry when you lie down, start a worry book. Well before bedtime, write down your worries, and then set the book and your concerns aside. · Try meditation or other relaxation techniques before you go to bed. · If you cannot fall asleep, get up and go to another room until you feel sleepy. Do something relaxing. Repeat your bedtime routine before you go to bed again. · Make your house quiet and calm about an hour before bedtime. Turn down the lights, turn off the TV, log off the computer, and turn down the volume on music. This can help you relax after a busy day. Drowsy Driving: The [employer] cites drowsiness as a causing factor in more than 595,312 police reported crashes annually, resulting in 76,000 injuries and 1,500 deaths. Other surveys suggest 55% of people polled have driven while drowsy in the past year, 23% had fallen asleep but not crashed, 3% crashed, and 2% had and accident due to drowsy driving. Who is at risk? Young Drivers: One study of drowsy driving accidents states that 55% of the drivers were under 25 years. Of those, 75% were male. Shift Workers and Travelers: People who work overnight or travel across time zones frequently are at higher risk of experiencing Circadian Rhythm Disorders. They are trying to work and function when their body is programed to sleep. Sleep Deprived: Lack of sleep has a serious impact on your ability to pay attention or focus on a task. Consistently getting less than the average of 8 hours your body needs creates partial or cumulative sleep deprivation. Untreated Sleep Disorders: Sleep Apnea, Narcolepsy, R.L.S., and other sleep disorders (untreated) prevent a person from getting enough restful sleep. This leads to excessive daytime sleepiness and increases the risk for drowsy driving accidents by up to 7 times.   Medications / Alcohol: Even over the counter medications can cause drowsiness. Medications that impair a drivers attention should have a warning label. Alcohol naturally makes you sleepy and on its own can cause accidents. Combined with excessive drowsiness its effects are amplified. Signs of Drowsy Driving:   * You don't remember driving the last few miles   * You may drift out of your kylah   * You are unable to focus and your thoughts wander   * You may yawn more often than normal   * You have difficulty keeping your eyes open / nodding off   * Missing traffic signs, speeding, or tailgating  Prevention-   Good sleep hygiene, lifestyle and behavioral choices have the most impact on drowsy driving. There is no substitute for sleep and the average person requires 8 hours nightly. If you find yourself driving drowsy, stop and sleep. Consider the sleep hygiene tips provided during your visit as well. Medication Refill Policy: Refills for all medications require 1 week advance notice. Please have your pharmacy fax a refill request. We are unable to fax, or call in \"controled substance\" medications and you will need to pick these prescriptions up from our office. [employer] Activation    Thank you for requesting access to [employer]. Please follow the instructions below to securely access and download your online medical record. [employer] allows you to send messages to your doctor, view your test results, renew your prescriptions, schedule appointments, and more. How Do I Sign Up? 1. In your internet browser, go to [email or web address]. 2. Click on the First Time User? Click Here link in the Sign In box. You will see the New Member Sign Up page. 3. Enter your [employer] Access Code exactly as it appears below. You will not need to use this code after youve completed the sign-up process. If you do not sign up before the expiration date, you must request a new code. [employer] Access Code:  Activation code not generated  Current [employer] Status: Active (This is the date your [employer] access code will )    4. Enter the last four digits of your Social Security Number (xxxx) and Date of Birth (mm/dd/yyyy) as indicated and click Submit. You will be taken to the next sign-up page. 5. Create a [employer] ID. This will be your [employer] login ID and cannot be changed, so think of one that is secure and easy to remember. 6. Create a [employer] password. You can change your password at any time. 7. Enter your Password Reset Question and Answer. This can be used at a later time if you forget your password. 8. Enter your e-mail address. You will receive e-mail notification when new information is available in [address] 9. Click Sign Up. You can now view and download portions of your medical record. 10. Click the Download Summary menu link to download a portable copy of your medical information. Additional Information    If you have questions, please call [phone]. Remember, [employer] is NOT to be used for urgent needs. For medical emergencies, dial 911.

## 2017-08-01 NOTE — PROGRESS NOTES
[address] [place]. [place], [address]  Tel.  [phone]  Fax. 100 [facility] [address]  [place], [address]  Tel.  [phone]  Fax. [phone] [address]  Tel.  [phone]  Fax. [phone]     S>Cira Dias is a 77 y.o. female seen for a positive airway pressure follow-up. She reports no problems using the device. She is 83.3% compliant over the past 30 days. The following problems are identified:    Drowsiness no Problems exhaling no   Snoring no Forget to put on no   Mask Comfortable yes Can't fall asleep no   Dry Mouth no Mask falls off no   Air Leaking no Frequent awakenings no         She admits that her sleep has improved and that she is unable to sleep without CPAP therapy. Allergies   Allergen Reactions    Codeine Nausea Only     GI    Macrodantin [Nitrofurantoin Macrocrystalline] Itching    Tenoretic 100 [Atenolol-Chlorthalidone] Other (comments)     fatigue    Zocor [Simvastatin] Swelling       She has a current medication list which includes the following prescription(s): losartan-hydrochlorothiazide, metformin, red yeast rice extract, cholecalciferol, cranberry conc/ascorbic acid, multivitamin, stress formula, flaxseed oil, calcium, and docosahexanoic acid/epa. .      She  has a past medical history of Esophageal reflux (4/13/2010); Essential hypertension, benign (4/13/2010); Mixed hyperlipidemia (4/13/2010); Obesity, unspecified (4/13/2010); and CINTHIA (obstructive sleep apnea) (08-). [place] Sleepiness Score: 8   and Modified F.O.S.Q. Score Total / 2: 15.5   which reflect improved sleep quality over therapy time.     O>    Visit Vitals    /70    Pulse 73    Ht 5' 4\" (1.626 m)    Wt 203 lb (92.1 kg)    SpO2 96%    BMI 34.84 kg/m2         General:   Not in acute distress   Eyes:  Anicteric sclerae, no obvious strabismus   Nose:  No obvious nasal septum deviation    Oropharynx:   Class 4 oropharyngeal outlet, thick tongue base, uvula not seen due to low-lying soft palate, narrow tonsilo-pharyngeal pilars   Tonsils:   tonsils are not visualized due to low-lying soft palate   Neck:   midline trachea   Chest/Lungs:  Equal lung expansion, clear on auscultation    CVS:  Normal rate, regular rhythm; no JVD   Skin:  Warm to touch; no obvious rashes   Neuro:  No focal deficits ; no obvious tremor    Psych:  Normal affect,  normal countenance;           A>    ICD-10-CM ICD-9-CM    1. CINTHIA (obstructive sleep apnea) G47.33 327.23 AMB SUPPLY ORDER   2. Essential hypertension, benign I10 401.1    3. BMI 34.0-34.9,adult Z68.34 V85.34      AHI = 5.6. On CPAP : 9 cmH2O. Compliant:      yes    Therapeutic Response:  Positive    P>    * We have recommended a dedicated weight loss through appropriate diet and an exercise regiment as significant weight reduction has been shown to reduce severity of obstructive sleep apnea. * Follow-up Disposition:  Return in about 3 months (around 11/1/2017), or if symptoms worsen or fail to improve. * She was asked to contact our office for any problems regarding PAP therapy. * Counseling was provided regarding the importance of regular PAP use and on proper sleep hygiene and safe driving. * Re-enforced proper and regular cleaning for the device. Thank you for allowing us to participate in your patient's medical care. Le Ferris MD, [facility]  Electronically signed.  08/01/17

## 2017-08-02 ENCOUNTER — DOCUMENTATION ONLY (OUTPATIENT)
Dept: SLEEP MEDICINE | Age: 67
End: 2017-08-02

## 2017-09-12 DIAGNOSIS — E11.9 CONTROLLED TYPE 2 DIABETES MELLITUS WITHOUT COMPLICATION, WITHOUT LONG-TERM CURRENT USE OF INSULIN (HCC): ICD-10-CM

## 2017-09-12 RX ORDER — METFORMIN HYDROCHLORIDE 500 MG/1
TABLET ORAL
Qty: 90 TAB | Refills: 0 | Status: SHIPPED | OUTPATIENT
Start: 2017-09-12 | End: 2017-12-17 | Stop reason: SDUPTHER

## 2017-09-13 ENCOUNTER — OFFICE VISIT (OUTPATIENT)
Dept: INTERNAL MEDICINE CLINIC | Age: 67
End: 2017-09-13

## 2017-09-13 VITALS
OXYGEN SATURATION: 98 % | SYSTOLIC BLOOD PRESSURE: 132 MMHG | RESPIRATION RATE: 14 BRPM | TEMPERATURE: 98.2 F | WEIGHT: 205.4 LBS | BODY MASS INDEX: 35.07 KG/M2 | HEART RATE: 65 BPM | DIASTOLIC BLOOD PRESSURE: 86 MMHG | HEIGHT: 64 IN

## 2017-09-13 DIAGNOSIS — E11.9 WELL CONTROLLED DIABETES MELLITUS (HCC): ICD-10-CM

## 2017-09-13 DIAGNOSIS — Z00.00 WELCOME TO MEDICARE PREVENTIVE VISIT: Primary | ICD-10-CM

## 2017-09-13 DIAGNOSIS — E78.2 MIXED HYPERLIPIDEMIA: ICD-10-CM

## 2017-09-13 DIAGNOSIS — Z23 NEED FOR SHINGLES VACCINE: ICD-10-CM

## 2017-09-13 DIAGNOSIS — I10 ESSENTIAL HYPERTENSION: ICD-10-CM

## 2017-09-13 DIAGNOSIS — Z71.89 ACP (ADVANCE CARE PLANNING): ICD-10-CM

## 2017-09-13 NOTE — PROGRESS NOTES
Chief Complaint   Patient presents with    Welcome To Medicare     [facility]      Visit Vitals    BP (!) 148/94 (BP 1 Location: Right arm)    Pulse 65    Temp 98.2 °F (36.8 °C) (Oral)    Resp 14    Ht 5' 4\" (1.626 m)    Wt 205 lb 6.4 oz (93.2 kg)    SpO2 98%    BMI 35.26 kg/m2     1. Have you been to the ER, urgent care clinic since your last visit? Hospitalized since your last visit? No    2. Have you seen or consulted any other health care providers outside of the [facility] since your last visit? Include any pap smears or colon screening.  No

## 2017-09-13 NOTE — ACP (ADVANCE CARE PLANNING)
Advance Care Planning (ACP) Provider Conversation Snapshot    Date of ACP Conversation: 09/13/17  Persons included in Conversation:  patient  Length of ACP Conversation in minutes:  16 minutes          For Patients with Decision Making Capacity:   Values/Goals: Exploration of values, goals, and preferences if recovery is not expected, even with continued medical treatment in the event of:  Imminent death    Conversation Outcomes / Follow-Up Plan:   Recommended completion of Advance Directive form after review of ACP materials and conversation with prospective healthcare agent

## 2017-09-13 NOTE — PATIENT INSTRUCTIONS

## 2017-09-13 NOTE — PROGRESS NOTES
This is a \"Welcome to [employer]"  Initial Preventive Physical Examination (IPPE) providing Personalized Prevention Plan Services (Performed in the first 12 months of enrollment)    I have reviewed the patient's medical history in detail and updated the computerized patient record. History     Past Medical History:   Diagnosis Date    Esophageal reflux 4/13/2010    Essential hypertension, benign 4/13/2010    Mixed hyperlipidemia 4/13/2010    Obesity, unspecified 4/13/2010    CINTHIA (obstructive sleep apnea) 08-    AHI: 5.6 per hour      Past Surgical History:   Procedure Laterality Date    BREAST SURGERY PROCEDURE UNLISTED      breast biopsy    HX GYN      HX TUBAL LIGATION       Current Outpatient Prescriptions   Medication Sig Dispense Refill    metFORMIN (GLUCOPHAGE) 500 mg tablet TAKE ONE TABLET BY MOUTH ONE TIME DAILY with breakfast  90 Tab 0    losartan-hydroCHLOROthiazide (HYZAAR) 100-25 mg per tablet Take 1 Tab by mouth daily. 90 Tab 1    red yeast rice extract 600 mg cap Take 600 mg by mouth now.  Cholecalciferol, Vitamin D3, (VITAMIN D) 1,000 unit Cap Take  by mouth.  VIT C/VITAMIN E ACETATE/CRANB (CRANBERRY CONC-ASCORBIC ACID PO) Take  by mouth.  multivitamin, stress formula (STRESS TAB) tablet Take 1 Tab by mouth daily.  FLAXSEED OIL PO Take  by mouth.  CALCIUM PO Take 500 mg by mouth two (2) times a day.  DOCOSAHEXANOIC ACID/EPA (FISH OIL PO) Take 1,000 mg by mouth daily.  2 tabs qd       Allergies   Allergen Reactions    Codeine Nausea Only     GI    Macrodantin [Nitrofurantoin Macrocrystalline] Itching    Tenoretic 100 [Atenolol-Chlorthalidone] Other (comments)     fatigue    Zocor [Simvastatin] Swelling     Family History   Problem Relation Age of Onset    Cancer Mother      breast    Cancer Brother      leukemia    Cancer Sister      Social History   Substance Use Topics    Smoking status: Never Smoker    Smokeless tobacco: Never Used   [facility] Alcohol use No     Diet, Lifestyle: The patient is prescribed and follows a special diet. Exercise level: moderately active    Depression Risk Screen     PHQ over the last two weeks 9/13/2017   Little interest or pleasure in doing things Not at all   Feeling down, depressed or hopeless Not at all   Total Score PHQ 2 0     Alcohol Risk Screen   On any occasion in the past three months you have had more than 3 drinks containing alcohol. Yes. Functional Ability and Level of Safety   Hearing Loss  Hearing is good. Vision Screening  Vision is good. The patient does not need further evaluation. Activities of Daily Living  The home contains: no safety equipment needed at home. Patient does total self care    Fall Risk Screen  Fall Risk Assessment, last 12 mths 9/13/2017   Able to walk? Yes   Fall in past 12 months? No   Fall with injury? -   Number of falls in past 12 months -   Fall Risk Score -       Abuse Screen  Patient is not abused    Screening EKG   EKG order placed: Yes   NSR with RBBB, Asymptomatic. Patient Care Team   Patient Care Team:  Zaida Acosta MD as PCP - General (Internal Medicine)  Dominic Pereyra MD as Physician (Sleep Medicine)     End of Life Planning   Advanced care planning directives were discussed with the patient and /or family/caregiver. Assessment/Plan   Education and counseling provided:  Are appropriate based on today's review and evaluation  End-of-Life planning (with patient's consent)  Pneumococcal Vaccine  2015  Screening Mammography schedule in October 2017  Screening Pap and pelvic (covered once every 2 years) 10/2016  Bone mass measurement (DEXA) schedule in October 2017. Screening for glaucoma every 2 years. Diabetes screening test  Diagnoses and all orders for this visit:    1.  Welcome to Medicare preventive visit  -     AMB POC EKG ROUTINE W/ 12 LEADS, INTER & REP  -     pneumococcal 13 connie conj dip (PREVNAR-13) 0.5 mL syrg injection; 0.5 mL by IntraMUSCular route once for 1 dose. 2. ACP (advance care planning)    Advanced directive discussed with patient. She will make an appointment with [facility] to complete the forms. 3. Essential hypertension    Well controlled on medication. 4. Mixed hyperlipidemia  -     LIPID PANEL  -     TSH 3RD GENERATION    5. Well controlled diabetes mellitus ([facility] [address]  -     METABOLIC PANEL, COMPREHENSIVE  -     HEMOGLOBIN A1C WITH EAG    6. Need for shingles vaccine  -     varicella zoster vacine live (ZOSTAVAX) 19,400 unit/0.65 mL susr injection; 1 Vial by SubCUTAneous route once for 1 dose.

## 2017-09-14 ENCOUNTER — HOSPITAL ENCOUNTER (OUTPATIENT)
Dept: LAB | Age: 67
Discharge: HOME | End: 2017-09-14
Payer: MEDICARE

## 2017-09-14 LAB
ALBUMIN SERPL-MCNC: 4.3 G/DL (ref 3.6–4.8)
ALBUMIN/GLOB SERPL: 2 {RATIO} (ref 1.2–2.2)
ALP SERPL-CCNC: 69 IU/L (ref 39–117)
ALT SERPL-CCNC: 18 IU/L (ref 0–32)
AST SERPL-CCNC: 21 IU/L (ref 0–40)
BILIRUB SERPL-MCNC: 0.6 MG/DL (ref 0–1.2)
BUN SERPL-MCNC: 15 MG/DL (ref 8–27)
BUN/CREAT SERPL: 19 (ref 12–28)
CALCIUM SERPL-MCNC: 9.5 MG/DL (ref 8.7–10.3)
CHLORIDE SERPL-SCNC: 100 MMOL/L (ref 96–106)
CHOLEST SERPL-MCNC: 175 MG/DL (ref 100–199)
CO2 SERPL-SCNC: 26 MMOL/L (ref 18–29)
CREAT SERPL-MCNC: 0.78 MG/DL (ref 0.57–1)
EST. AVERAGE GLUCOSE BLD GHB EST-MCNC: 114 MG/DL
GLOBULIN SER CALC-MCNC: 2.2 G/DL (ref 1.5–4.5)
GLUCOSE SERPL-MCNC: 104 MG/DL (ref 65–99)
HBA1C MFR BLD: 5.6 % (ref 4.8–5.6)
HDLC SERPL-MCNC: 75 MG/DL
INTERPRETATION, 910389: NORMAL
LDLC SERPL CALC-MCNC: 83 MG/DL (ref 0–99)
Lab: NORMAL
POTASSIUM SERPL-SCNC: 4.2 MMOL/L (ref 3.5–5.2)
PROT SERPL-MCNC: 6.5 G/DL (ref 6–8.5)
SODIUM SERPL-SCNC: 143 MMOL/L (ref 134–144)
TRIGL SERPL-MCNC: 86 MG/DL (ref 0–149)
TSH SERPL DL<=0.005 MIU/L-ACNC: 1.85 UIU/ML (ref 0.45–4.5)
VLDLC SERPL CALC-MCNC: 17 MG/DL (ref 5–40)

## 2017-09-14 PROCEDURE — 80061 LIPID PANEL: CPT

## 2017-09-14 PROCEDURE — 83036 HEMOGLOBIN GLYCOSYLATED A1C: CPT

## 2017-09-14 PROCEDURE — 84443 ASSAY THYROID STIM HORMONE: CPT

## 2017-09-14 PROCEDURE — 36415 COLL VENOUS BLD VENIPUNCTURE: CPT

## 2017-09-14 PROCEDURE — 80053 COMPREHEN METABOLIC PANEL: CPT

## 2017-09-14 NOTE — PROGRESS NOTES
Jose De La Fuente, your HbA1C ( diabetic test) in normal range now. So proud of you! If you want to stop Metformin you can , just follow diabetic diet.

## 2017-11-07 ENCOUNTER — OFFICE VISIT (OUTPATIENT)
Dept: SLEEP MEDICINE | Age: 67
End: 2017-11-07

## 2017-11-07 VITALS
WEIGHT: 209.6 LBS | OXYGEN SATURATION: 98 % | HEART RATE: 68 BPM | DIASTOLIC BLOOD PRESSURE: 82 MMHG | HEIGHT: 64 IN | SYSTOLIC BLOOD PRESSURE: 139 MMHG | BODY MASS INDEX: 35.78 KG/M2

## 2017-11-07 DIAGNOSIS — I10 ESSENTIAL HYPERTENSION: ICD-10-CM

## 2017-11-07 DIAGNOSIS — G47.33 OSA (OBSTRUCTIVE SLEEP APNEA): Primary | ICD-10-CM

## 2017-11-07 NOTE — PATIENT INSTRUCTIONS
217 [facility] [place]. [place], [address]  Tel.  [phone]  Fax. 100 [facility] 60  [place] [facility], [address]  Tel.  [phone]  Fax. [phone] [address] [address] [address]  Tel.  [phone]  Fax. [phone]     Learning About CPAP for Sleep Apnea  What is CPAP? CPAP is a small machine that you use at home every night while you sleep. It increases air pressure in your throat to keep your airway open. When you have sleep apnea, this can help you sleep better so you feel much better. CPAP stands for \"continuous positive airway pressure. \"  The CPAP machine will have one of the following:  · A mask that covers your nose and mouth  · Prongs that fit into your nose  · A mask that covers your nose only, the most common type. This type is called NCPAP. The N stands for \"nasal.\"  Why is it done? CPAP is usually the best treatment for obstructive sleep apnea. It is the first treatment choice and the most widely used. Your doctor may suggest CPAP if you have:  · Moderate to severe sleep apnea. · Sleep apnea and coronary artery disease (CAD) or heart failure. How does it help? · CPAP can help you have more normal sleep, so you feel less sleepy and more alert during the daytime. · CPAP may help keep heart failure or other heart problems from getting worse. · NCPAP may help lower your blood pressure. · If you use CPAP, your bed partner may also sleep better because you are not snoring or restless. What are the side effects? Some people who use CPAP have:  · A dry or stuffy nose and a sore throat. · Irritated skin on the face. · Sore eyes. · Bloating. If you have any of these problems, work with your doctor to fix them. Here are some things you can try:  · Be sure the mask or nasal prongs fit well. · See if your doctor can adjust the pressure of your CPAP. · If your nose is dry, try a humidifier.   · If your nose is runny or stuffy, try decongestant medicine or a steroid nasal spray. If these things do not help, you might try a different type of machine. Some machines have air pressure that adjusts on its own. Others have air pressures that are different when you breathe in than when you breathe out. This may reduce discomfort caused by too much pressure in your nose. Where can you learn more? Go to [employer]  Enter Debbie Stinson in the search box to learn more about \"Learning About CPAP for Sleep Apnea. \"   © 8339-5507 Healthwise, Incorporated. Care instructions adapted under license by [employer] (which disclaims liability or warranty for this information). This care instruction is for use with your licensed healthcare professional. If you have questions about a medical condition or this instruction, always ask your healthcare professional. [address] any warranty or liability for your use of this information. Content Version: 9.4.48853; Last Revised: January 11, 2010  PROPER SLEEP HYGIENE    What to avoid  · Do not have drinks with caffeine, such as coffee or black tea, for 8 hours before bed. · Do not smoke or use other types of tobacco near bedtime. Nicotine is a stimulant and can keep you awake. · Avoid drinking alcohol late in the evening, because it can cause you to wake in the middle of the night. · Do not eat a big meal close to bedtime. If you are hungry, eat a light snack. · Do not drink a lot of water close to bedtime, because the need to urinate may wake you up during the night. · Do not read or watch TV in bed. Use the bed only for sleeping and sexual activity. What to try  · Go to bed at the same time every night, and wake up at the same time every morning. Do not take naps during the day. · Keep your bedroom quiet, dark, and cool. · Get regular exercise, but not within 3 to 4 hours of your bedtime. .  · Sleep on a comfortable pillow and mattress.   · If watching the clock makes you anxious, turn it facing away from you so you cannot see the time. · If you worry when you lie down, start a worry book. Well before bedtime, write down your worries, and then set the book and your concerns aside. · Try meditation or other relaxation techniques before you go to bed. · If you cannot fall asleep, get up and go to another room until you feel sleepy. Do something relaxing. Repeat your bedtime routine before you go to bed again. · Make your house quiet and calm about an hour before bedtime. Turn down the lights, turn off the TV, log off the computer, and turn down the volume on music. This can help you relax after a busy day. Drowsy Driving: The [employer] cites drowsiness as a causing factor in more than 837,964 police reported crashes annually, resulting in 76,000 injuries and 1,500 deaths. Other surveys suggest 55% of people polled have driven while drowsy in the past year, 23% had fallen asleep but not crashed, 3% crashed, and 2% had and accident due to drowsy driving. Who is at risk? Young Drivers: One study of drowsy driving accidents states that 55% of the drivers were under 25 years. Of those, 75% were male. Shift Workers and Travelers: People who work overnight or travel across time zones frequently are at higher risk of experiencing Circadian Rhythm Disorders. They are trying to work and function when their body is programed to sleep. Sleep Deprived: Lack of sleep has a serious impact on your ability to pay attention or focus on a task. Consistently getting less than the average of 8 hours your body needs creates partial or cumulative sleep deprivation. Untreated Sleep Disorders: Sleep Apnea, Narcolepsy, R.L.S., and other sleep disorders (untreated) prevent a person from getting enough restful sleep. This leads to excessive daytime sleepiness and increases the risk for drowsy driving accidents by up to 7 times.   Medications / Alcohol: Even over the counter medications can cause drowsiness. Medications that impair a drivers attention should have a warning label. Alcohol naturally makes you sleepy and on its own can cause accidents. Combined with excessive drowsiness its effects are amplified. Signs of Drowsy Driving:   * You don't remember driving the last few miles   * You may drift out of your kylah   * You are unable to focus and your thoughts wander   * You may yawn more often than normal   * You have difficulty keeping your eyes open / nodding off   * Missing traffic signs, speeding, or tailgating  Prevention-   Good sleep hygiene, lifestyle and behavioral choices have the most impact on drowsy driving. There is no substitute for sleep and the average person requires 8 hours nightly. If you find yourself driving drowsy, stop and sleep. Consider the sleep hygiene tips provided during your visit as well. Medication Refill Policy: Refills for all medications require 1 week advance notice. Please have your pharmacy fax a refill request. We are unable to fax, or call in \"controled substance\" medications and you will need to pick these prescriptions up from our office. [employer] Activation    Thank you for requesting access to [employer]. Please follow the instructions below to securely access and download your online medical record. [employer] allows you to send messages to your doctor, view your test results, renew your prescriptions, schedule appointments, and more. How Do I Sign Up? 1. In your internet browser, go to [email or web address]. 2. Click on the First Time User? Click Here link in the Sign In box. You will see the New Member Sign Up page. 3. Enter your [employer] Access Code exactly as it appears below. You will not need to use this code after youve completed the sign-up process. If you do not sign up before the expiration date, you must request a new code. [employer] Access Code:  Activation code not generated  Current [employer] Status: Active (This is the date your [employer] access code will )    4. Enter the last four digits of your Social Security Number (xxxx) and Date of Birth (mm/dd/yyyy) as indicated and click Submit. You will be taken to the next sign-up page. 5. Create a [employer] ID. This will be your [employer] login ID and cannot be changed, so think of one that is secure and easy to remember. 6. Create a [employer] password. You can change your password at any time. 7. Enter your Password Reset Question and Answer. This can be used at a later time if you forget your password. 8. Enter your e-mail address. You will receive e-mail notification when new information is available in [address] 9. Click Sign Up. You can now view and download portions of your medical record. 10. Click the Download Summary menu link to download a portable copy of your medical information. Additional Information    If you have questions, please call [phone]. Remember, [employer] is NOT to be used for urgent needs. For medical emergencies, dial 911.

## 2017-11-07 NOTE — PROGRESS NOTES
217 [facility] [place]. [place], [address]  Tel.  [phone]  Fax. 100 [facility] 60  [place], [address] [facility]  Tel.  [phone]  Fax. [phone] [address] [facility] [address]  Tel.  [phone]  Fax. [phone]     S>Kathryn Dimas [place] is a 77 y.o. female seen for a positive airway pressure follow-up. She reports no problems using the device. She is 84.4% compliant over the past 30 days. The following problems are identified:    Drowsiness no Problems exhaling no   Snoring no Forget to put on no   Mask Comfortable yes Can't fall asleep no   Dry Mouth no Mask falls off no   Air Leaking no Frequent awakenings no         She admits that her sleep has improved. Allergies   Allergen Reactions    Codeine Nausea Only     GI    Macrodantin [Nitrofurantoin Macrocrystalline] Itching    Tenoretic 100 [Atenolol-Chlorthalidone] Other (comments)     fatigue    Zocor [Simvastatin] Swelling       She has a current medication list which includes the following prescription(s): metformin, losartan-hydrochlorothiazide, red yeast rice extract, cholecalciferol, cranberry conc/ascorbic acid, multivitamin, stress formula, flaxseed oil, calcium, and docosahexanoic acid/epa. .      She  has a past medical history of Esophageal reflux (4/13/2010); Essential hypertension, benign (4/13/2010); Mixed hyperlipidemia (4/13/2010); Obesity, unspecified (4/13/2010); and CINTHIA (obstructive sleep apnea) (08-). [place] Sleepiness Score: 6   and Modified F.O.S.Q. Score Total / 2: 16.5   which reflect improved sleep quality over therapy time.     O>    Visit Vitals    /82    Pulse 68    Ht 5' 4\" (1.626 m)    Wt 209 lb 9.6 oz (95.1 kg)    SpO2 98%    BMI 35.98 kg/m2         General:   Not in acute distress   Eyes:  Anicteric sclerae, no obvious strabismus   Nose:  No obvious nasal septum deviation    Oropharynx:   Class 4 oropharyngeal outlet, thick tongue base, uvula not seen due to low-lying soft palate, narrow tonsilo-pharyngeal pilars   Tonsils:   tonsils are not visualized due to low-lying soft palate   Neck:   midline trachea   Chest/Lungs:  Equal lung expansion, clear on auscultation    CVS:  Normal rate, regular rhythm; no JVD   Skin:  Warm to touch; no obvious rashes   Neuro:  No focal deficits ; no obvious tremor    Psych:  Normal affect,  normal countenance;           A>    ICD-10-CM ICD-9-CM    1. CINTHIA (obstructive sleep apnea) G47.33 327.23    2. Essential hypertension I10 401.9    3. BMI 35.0-35.9,adult Z68.35 V85.35      AHI = 5.6. On CPAP : 9 cmH2O. Compliant:      yes    Therapeutic Response:  Positive    P>    * We have recommended a dedicated weight loss through appropriate diet and an exercise regiment as significant weight reduction has been shown to reduce severity of obstructive sleep apnea. * Follow-up Disposition:  Return in about 1 year (around 11/7/2018), or if symptoms worsen or fail to improve. * She was asked to contact our office for any problems regarding PAP therapy. * Counseling was provided regarding the importance of regular PAP use and on proper sleep hygiene and safe driving. * Re-enforced proper and regular cleaning for the device. Thank you for allowing us to participate in your patient's medical care. Emily Almeida MD, FAASM  Electronically signed.  11/07/17

## 2017-12-17 DIAGNOSIS — E11.9 CONTROLLED TYPE 2 DIABETES MELLITUS WITHOUT COMPLICATION, WITHOUT LONG-TERM CURRENT USE OF INSULIN (HCC): ICD-10-CM

## 2017-12-17 RX ORDER — METFORMIN HYDROCHLORIDE 500 MG/1
TABLET ORAL
Qty: 90 TAB | Refills: 0 | Status: SHIPPED | OUTPATIENT
Start: 2017-12-17 | End: 2018-04-02 | Stop reason: SDUPTHER

## 2018-04-02 DIAGNOSIS — E11.9 CONTROLLED TYPE 2 DIABETES MELLITUS WITHOUT COMPLICATION, WITHOUT LONG-TERM CURRENT USE OF INSULIN (HCC): ICD-10-CM

## 2018-04-02 RX ORDER — METFORMIN HYDROCHLORIDE 500 MG/1
TABLET ORAL
Qty: 90 TAB | Refills: 0 | Status: SHIPPED | OUTPATIENT
Start: 2018-04-02 | End: 2018-07-02 | Stop reason: SDUPTHER

## 2018-05-21 DIAGNOSIS — I10 ESSENTIAL HYPERTENSION, BENIGN: ICD-10-CM

## 2018-05-21 RX ORDER — LOSARTAN POTASSIUM AND HYDROCHLOROTHIAZIDE 25; 100 MG/1; MG/1
TABLET ORAL
Qty: 90 TAB | Refills: 0 | Status: SHIPPED | OUTPATIENT
Start: 2018-05-21 | End: 2018-08-26 | Stop reason: SDUPTHER

## 2018-07-02 DIAGNOSIS — E11.9 CONTROLLED TYPE 2 DIABETES MELLITUS WITHOUT COMPLICATION, WITHOUT LONG-TERM CURRENT USE OF INSULIN (HCC): ICD-10-CM

## 2018-07-02 RX ORDER — METFORMIN HYDROCHLORIDE 500 MG/1
TABLET ORAL
Qty: 90 TAB | Refills: 0 | Status: SHIPPED | OUTPATIENT
Start: 2018-07-02 | End: 2019-02-10 | Stop reason: SDUPTHER

## 2018-08-26 DIAGNOSIS — I10 ESSENTIAL HYPERTENSION, BENIGN: ICD-10-CM

## 2018-08-26 RX ORDER — LOSARTAN POTASSIUM AND HYDROCHLOROTHIAZIDE 25; 100 MG/1; MG/1
TABLET ORAL
Qty: 90 TAB | Refills: 0 | Status: SHIPPED | OUTPATIENT
Start: 2018-08-26 | End: 2018-11-18 | Stop reason: SDUPTHER

## 2018-09-20 ENCOUNTER — OFFICE VISIT (OUTPATIENT)
Dept: PRIMARY CARE CLINIC | Age: 68
End: 2018-09-20

## 2018-09-20 VITALS
OXYGEN SATURATION: 97 % | RESPIRATION RATE: 18 BRPM | HEIGHT: 64 IN | BODY MASS INDEX: 38.28 KG/M2 | WEIGHT: 224.2 LBS | TEMPERATURE: 98.1 F | SYSTOLIC BLOOD PRESSURE: 140 MMHG | HEART RATE: 70 BPM | DIASTOLIC BLOOD PRESSURE: 90 MMHG

## 2018-09-20 DIAGNOSIS — Z12.11 COLON CANCER SCREENING: ICD-10-CM

## 2018-09-20 DIAGNOSIS — E11.9 WELL CONTROLLED DIABETES MELLITUS (HCC): ICD-10-CM

## 2018-09-20 DIAGNOSIS — E78.2 MIXED HYPERLIPIDEMIA: ICD-10-CM

## 2018-09-20 DIAGNOSIS — E66.9 OBESITY (BMI 30-39.9): ICD-10-CM

## 2018-09-20 DIAGNOSIS — Z71.89 ACP (ADVANCE CARE PLANNING): ICD-10-CM

## 2018-09-20 DIAGNOSIS — Z00.00 MEDICARE ANNUAL WELLNESS VISIT, SUBSEQUENT: Primary | ICD-10-CM

## 2018-09-20 DIAGNOSIS — I10 ESSENTIAL HYPERTENSION, BENIGN: ICD-10-CM

## 2018-09-20 PROBLEM — E66.01 SEVERE OBESITY (BMI 35.0-39.9): Status: ACTIVE | Noted: 2018-09-20

## 2018-09-20 NOTE — PROGRESS NOTES
Kya Saldivar is a 79 y.o. female and presents for Annual Medicare Wellness Visit. Assessment of cognitive impairment: Alert and oriented x 3. Depression Screen: PHQ over the last two weeks 9/20/2018 Little interest or pleasure in doing things Not at all Feeling down, depressed, irritable, or hopeless Not at all Total Score PHQ 2 0 Fall Risk Assessment:   
Fall Risk Assessment, last 12 mths 9/20/2018 Able to walk? Yes Fall in past 12 months? No  
Fall with injury? -  
Number of falls in past 12 months - Fall Risk Score -  
 
 
Abuse Screen:  
Abuse Screening Questionnaire 9/20/2018 Do you ever feel afraid of your partner? Pepito Moreno Are you in a relationship with someone who physically or mentally threatens you? Pepito Moreno Is it safe for you to go home? Lianne Sarah Activities of Daily Living: 
Self-care. Requires assistance with: no ADLs Patient handle his/her own medications  yes Use of pill box  yes Activities of Daily Living: ADL Assessment 9/20/2018 Feeding yourself No Help Needed Getting from bed to chair No Help Needed Getting dressed No Help Needed Bathing or showering No Help Needed Walk across the room (includes cane/walker) No Help Needed Using the telphone No Help Needed Taking your medications No Help Needed Preparing meals No Help Needed Managing money (expenses/bills) No Help Needed Moderately strenuous housework (laundry) No Help Needed Shopping for personal items (toiletries/medicines) No Help Needed Shopping for groceries No Help Needed Driving No Help Needed Climbing a flight of stairs No Help Needed Getting to places beyond walking distances No Help Needed Health Maintenance: 
Daily Aspirin: recommended to start daily 81mg Bone Density: up to date 11/2017 Glaucoma Screening: yes- 2016 Immunizations:  
 Tetanus: up to date. - 2016 Influenza: Not applicable today. Shingles: wants to wait. PPSV-23 done in 2013 . Prevnar-13: done in 2017. Cancer screening:  
 Cervical: up to date. 11/2017  Breast: up to date done in 2017  Colon: not up to date - declines colonoscopy , agreed for cologuard. .  Prostate:  N/A Alcohol Risk Screen: On any occasion during the past 3 months, have you had more than 3 drinks(female) or 4 drinks (male) containing alcohol in one? No 
Do you average more than 7 drinks (female) or 14 drinks (male) per week? Not applicable Type and amount:N/A Hearing Loss: 
Hearing is good. denies any hearing loss wears hearing aides Vision Loss:  
Wears glasses, contact lenses, or have any other visual impairment  yes Adult Nutrition Screen: No risk factors noted. Advance Care Planning:  
End of Life Planning: has NO advanced directive - , add't info provided, reviewed DNR/DNI and patient is not interested Wolf Wilkinson ACP-Facilitator appointment Patient indicates she is not interested at this time Medications/Allergies: Reviewed with patient Prior to Admission medications Medication Sig Start Date End Date Taking? Authorizing Provider  
losartan-hydroCHLOROthiazide (HYZAAR) 100-25 mg per tablet TAKE ONE TABLET BY MOUTH ONE TIME DAILY 8/26/18  Yes Ruiz Sewell MD  
metFORMIN (GLUCOPHAGE) 500 mg tablet TAKE 1 TABLET BY MOUTH ONCE DAILY IN THE MORNING WITH BREAKFAST 7/2/18  Yes Ruiz Sewell MD  
Cholecalciferol, Vitamin D3, (VITAMIN D) 1,000 unit Cap Take  by mouth. Yes Historical Provider VIT C/VITAMIN E ACETATE/CRANB (CRANBERRY CONC-ASCORBIC ACID PO) Take  by mouth. Yes Historical Provider  
multivitamin, stress formula (STRESS TAB) tablet Take 1 Tab by mouth daily. Yes Historical Provider FLAXSEED OIL PO Take  by mouth. Yes Historical Provider CALCIUM PO Take 500 mg by mouth two (2) times a day. 6/21/11  Yes Historical Provider DOCOSAHEXANOIC ACID/EPA (FISH OIL PO) Take 1,000 mg by mouth daily. 2 tabs qd 6/21/11  Yes Historical Provider red yeast rice extract 600 mg cap Take 600 mg by mouth now. Historical Provider Allergies Allergen Reactions  Codeine Nausea Only GI  
 Macrodantin [Nitrofurantoin Macrocrystalline] Itching  Tenoretic 100 [Atenolol-Chlorthalidone] Other (comments)  
  fatigue  Zocor [Simvastatin] Swelling Objective: 
Visit Vitals  /84 (BP 1 Location: Right arm, BP Patient Position: Sitting) Repeat 140/90  Pulse 70  Temp 98.1 °F (36.7 °C) (Oral)  Resp 18  Ht 5' 4\" (1.626 m)  Wt 224 lb 3.2 oz (101.7 kg)  SpO2 97%  BMI 38.48 kg/m2 Body mass index is 38.48 kg/(m^2). Problem List: Reviewed with patient and discussed risk factors. Patient Active Problem List  
Diagnosis Code  Essential hypertension, benign I10  
 Mixed hyperlipidemia E78.2  Esophageal reflux K21.9  Obesity, unspecified E66.9  Sleep apnea, obstructive G47.33  
 
 
PSH: Reviewed with patient Past Surgical History:  
Procedure Laterality Date  BREAST SURGERY PROCEDURE UNLISTED    
 breast biopsy  HX GYN    
 HX TUBAL LIGATION    
  
 
SH: Reviewed with patient Social History Substance Use Topics  Smoking status: Never Smoker  Smokeless tobacco: Never Used  Alcohol use No  
 
 
FH: Reviewed with patient Family History Problem Relation Age of Onset  Cancer Mother   
  breast  
 Cancer Brother   
  leukemia  Cancer Sister Current medical providers:   
Patient Care Team: 
Katlyn Ellington MD as PCP - General (Internal Medicine) Ilana Liz MD as Physician (Sleep Medicine) Plan:   
 
Diagnoses and all orders for this visit: 
 
1. Medicare annual wellness visit, subsequent Immunization & Health screening discussed with her. 2. ACP (advance care planning) She is making a living will with the  & will complete Advanced directive with him as well. Recommended bringing a copy for our record as well. 3. Colon cancer screening -     COLOGUARD TEST (FECAL DNA COLORECTAL CANCER SCREENING) Health Maintenance Topic Date Due  
 EYE EXAM RETINAL OR DILATED Q1  11/08/1960  GLAUCOMA SCREENING Q2Y  11/08/2015  Pneumococcal 65+ Low/Medium Risk (2 of 2 - PPSV23) 11/08/2015  MICROALBUMIN Q1  09/16/2016  
 FOOT EXAM Q1  12/15/2016  
 HEMOGLOBIN A1C Q6M  03/13/2018  Influenza Age 5 to Adult  08/01/2018  LIPID PANEL Q1  09/13/2018  MEDICARE YEARLY EXAM  09/14/2018  BREAST CANCER SCRN MAMMOGRAM  10/06/2018  DTaP/Tdap/Td series (2 - Td) 08/03/2026  COLONOSCOPY  09/13/2027  Hepatitis C Screening  Completed  Bone Densitometry (Dexa) Screening  Completed  ZOSTER VACCINE AGE 60>  Addressed Urinary/ Fecal Incontinence: None Regular physical exercise: Curves 2-3/week and tries to walk 2-3 miles  few times a week. Patient verbalized understanding of information presented. AVS and Medicare Part B Preventive Services Table printed and given to pt and reviewed. See table for findings under Recommendation and Scheduled. All of the patient's questions were answered. Patient seen today for follow up on chronic conditions. She has gained 19 lbs since last seen in the office. She did admit that she is not compliant with diabetic diet. Her blood pressure readings high in the office. She has been taking Hyzaar & Metformin regularly. Denies any headache or dizziness. Not checking blood sugars or blood pressure readings regularly at home. She has joined the Gym lately & will go back to her low carb diet. She has lost close to 40 lbs weight before & thinks she can do it again. Review of Systems Constitutional: Negative for fever, chills and malaise/fatigue. Eyes: Negative for blurred vision and discharge. Respiratory: Negative for cough, shortness of breath and wheezing. Cardiovascular: Negative for chest pain, palpitations and leg swelling. Gastrointestinal: Negative for nausea and vomiting. Genitourinary: Negative for urgency and frequency. Musculoskeletal: Negative for myalgias and joint pain. Neurological: Negative for dizziness, sensory change, focal weakness and headaches. Endo/Heme/Allergies: Does not bruise/bleed easily. Psychiatric/Behavioral: Negative for substance abuse. The patient does not have insomnia. Physical Exam  
Constitutional: She is oriented. She appears well-developed and well-nourished. No distress. HENT:  
Mouth/Throat: Oropharynx is clear and moist. No oropharyngeal exudate. Eyes: Conjunctivae and extraocular motions are normal. Pupils are equal, round, and reactive to light. Neck: Normal range of motion. Neck supple. Cardiovascular: Regular rhythm and normal heart sounds. Pulmonary/Chest: Breath sounds normal. No respiratory distress. She has no wheezes. Abdominal: Soft. Bowel sounds are normal. She exhibits no distension. Musculoskeletal: She exhibits no edema and no tenderness. Neurological: She is oriented. She has normal reflexes. No cranial nerve deficit. Psychiatric: She has a normal mood and affect. Her behavior is normal.  
 
Diabetic foot exam:  
 
Left: Vibratory sensation normal  
 Sharp/dull discrimination normal  
 Filament test normal sensation with micro filament Pulse DP: 2+ (normal) Deformities: None Right: Vibratory sensation normal 
 Sharp/dull discrimination normal 
 Filament test normal sensation with micro filament Pulse DP: 2+ (normal) Deformities: None Diagnoses and all orders for this visit: 
 
 
1. Well controlled diabetes mellitus ([address] 
-      DIABETES FOOT EXAM 
-     AMB POC URINE, MICROALBUMIN, SEMIQUANT (1 RESULT) -     METABOLIC PANEL, COMPREHENSIVE 
-     CBC W/O DIFF 
-     HEMOGLOBIN A1C WITH EAG 
-     TSH 3RD GENERATION 2. Mixed hyperlipidemia -     LIPID PANEL 3. Essential hypertension, benign Recommended checking B.p readings at home & if persistently above 140/90 contact me regarding Medication adjustment. 4. Obesity (BMI 30-39. 9) She has joined the [employer]. Encouraged low carb , low calorie diet.

## 2018-09-21 LAB
ALBUMIN SERPL-MCNC: 4.8 G/DL (ref 3.6–4.8)
ALBUMIN/GLOB SERPL: 2.2 {RATIO} (ref 1.2–2.2)
ALP SERPL-CCNC: 72 IU/L (ref 39–117)
ALT SERPL-CCNC: 11 IU/L (ref 0–32)
AST SERPL-CCNC: 16 IU/L (ref 0–40)
BILIRUB SERPL-MCNC: 0.5 MG/DL (ref 0–1.2)
BUN SERPL-MCNC: 18 MG/DL (ref 8–27)
BUN/CREAT SERPL: 24 (ref 12–28)
CALCIUM SERPL-MCNC: 9.5 MG/DL (ref 8.7–10.3)
CHLORIDE SERPL-SCNC: 101 MMOL/L (ref 96–106)
CHOLEST SERPL-MCNC: 235 MG/DL (ref 100–199)
CO2 SERPL-SCNC: 25 MMOL/L (ref 20–29)
CREAT SERPL-MCNC: 0.74 MG/DL (ref 0.57–1)
ERYTHROCYTE [DISTWIDTH] IN BLOOD BY AUTOMATED COUNT: 13.2 % (ref 12.3–15.4)
EST. AVERAGE GLUCOSE BLD GHB EST-MCNC: 137 MG/DL
GLOBULIN SER CALC-MCNC: 2.2 G/DL (ref 1.5–4.5)
GLUCOSE SERPL-MCNC: 111 MG/DL (ref 65–99)
HBA1C MFR BLD: 6.4 % (ref 4.8–5.6)
HCT VFR BLD AUTO: 40.5 % (ref 34–46.6)
HDLC SERPL-MCNC: 60 MG/DL
HGB BLD-MCNC: 14 G/DL (ref 11.1–15.9)
LDLC SERPL CALC-MCNC: 151 MG/DL (ref 0–99)
MCH RBC QN AUTO: 32.6 PG (ref 26.6–33)
MCHC RBC AUTO-ENTMCNC: 34.6 G/DL (ref 31.5–35.7)
MCV RBC AUTO: 94 FL (ref 79–97)
PLATELET # BLD AUTO: 330 X10E3/UL (ref 150–379)
POTASSIUM SERPL-SCNC: 4.3 MMOL/L (ref 3.5–5.2)
PROT SERPL-MCNC: 7 G/DL (ref 6–8.5)
RBC # BLD AUTO: 4.3 X10E6/UL (ref 3.77–5.28)
SODIUM SERPL-SCNC: 142 MMOL/L (ref 134–144)
TRIGL SERPL-MCNC: 120 MG/DL (ref 0–149)
TSH SERPL DL<=0.005 MIU/L-ACNC: 1.82 UIU/ML (ref 0.45–4.5)
VLDLC SERPL CALC-MCNC: 24 MG/DL (ref 5–40)
WBC # BLD AUTO: 5.2 X10E3/UL (ref 3.4–10.8)

## 2018-09-23 NOTE — PROGRESS NOTES
Rio Busby, your cholesterol & Hba1C have gone up. You had already joined a Gym , if you want to work on weight loss & exercise for 3 months , we can repeat labs & see where numbers stand before we make any changes in your medications.

## 2018-11-18 DIAGNOSIS — I10 ESSENTIAL HYPERTENSION, BENIGN: ICD-10-CM

## 2018-11-18 RX ORDER — LOSARTAN POTASSIUM AND HYDROCHLOROTHIAZIDE 25; 100 MG/1; MG/1
TABLET ORAL
Qty: 90 TAB | Refills: 0 | Status: SHIPPED | OUTPATIENT
Start: 2018-11-18 | End: 2019-02-10 | Stop reason: SDUPTHER

## 2018-12-26 ENCOUNTER — TELEPHONE (OUTPATIENT)
Dept: PRIMARY CARE CLINIC | Age: 68
End: 2018-12-26

## 2018-12-26 NOTE — TELEPHONE ENCOUNTER
Spoke with patient after confirming patient identifiers and inquired about concerns or barriers to submitting her Cologuard sample. Patient indicated that the first specimen she tried to send was deemed, \"too heavy,\" so they sent her another test and told her she needed a smaller specimen. Patient states that with the holidays, etc. She has not had the opportunity to submit a second sample for testing, but it is her intention to do so. Encouraged to call the office with questions or concerns. End of encounter.

## 2019-01-31 ENCOUNTER — OFFICE VISIT (OUTPATIENT)
Dept: SLEEP MEDICINE | Age: 69
End: 2019-01-31

## 2019-01-31 ENCOUNTER — DOCUMENTATION ONLY (OUTPATIENT)
Dept: SLEEP MEDICINE | Age: 69
End: 2019-01-31

## 2019-01-31 VITALS
SYSTOLIC BLOOD PRESSURE: 142 MMHG | DIASTOLIC BLOOD PRESSURE: 82 MMHG | HEIGHT: 64 IN | OXYGEN SATURATION: 96 % | BODY MASS INDEX: 37.98 KG/M2 | WEIGHT: 222.5 LBS | HEART RATE: 75 BPM

## 2019-01-31 DIAGNOSIS — G47.33 OSA (OBSTRUCTIVE SLEEP APNEA): Primary | ICD-10-CM

## 2019-01-31 NOTE — PATIENT INSTRUCTIONS
[address] [facility] [place]. [place], [address]  Tel.  [phone]  Fax. 100 [facility] [address]  [place], [address] [facility]  Tel.  [phone]  Fax. [phone] [address]  Tel.  [phone]  Fax. [phone]     Learning About CPAP for Sleep Apnea  What is CPAP? CPAP is a small machine that you use at home every night while you sleep. It increases air pressure in your throat to keep your airway open. When you have sleep apnea, this can help you sleep better so you feel much better. CPAP stands for \"continuous positive airway pressure. \"  The CPAP machine will have one of the following:  · A mask that covers your nose and mouth  · Prongs that fit into your nose  · A mask that covers your nose only, the most common type. This type is called NCPAP. The N stands for \"nasal.\"  Why is it done? CPAP is usually the best treatment for obstructive sleep apnea. It is the first treatment choice and the most widely used. Your doctor may suggest CPAP if you have:  · Moderate to severe sleep apnea. · Sleep apnea and coronary artery disease (CAD) or heart failure. How does it help? · CPAP can help you have more normal sleep, so you feel less sleepy and more alert during the daytime. · CPAP may help keep heart failure or other heart problems from getting worse. · NCPAP may help lower your blood pressure. · If you use CPAP, your bed partner may also sleep better because you are not snoring or restless. What are the side effects? Some people who use CPAP have:  · A dry or stuffy nose and a sore throat. · Irritated skin on the face. · Sore eyes. · Bloating. If you have any of these problems, work with your doctor to fix them. Here are some things you can try:  · Be sure the mask or nasal prongs fit well. · See if your doctor can adjust the pressure of your CPAP. · If your nose is dry, try a humidifier.   · If your nose is runny or stuffy, try decongestant medicine or a steroid nasal spray. If these things do not help, you might try a different type of machine. Some machines have air pressure that adjusts on its own. Others have air pressures that are different when you breathe in than when you breathe out. This may reduce discomfort caused by too much pressure in your nose. Where can you learn more? Go to [employer]  Enter Jose Frazee in the search box to learn more about \"Learning About CPAP for Sleep Apnea. \"   © 3870-1101 Healthwise, Incorporated. Care instructions adapted under license by [employer] (which disclaims liability or warranty for this information). This care instruction is for use with your licensed healthcare professional. If you have questions about a medical condition or this instruction, always ask your healthcare professional. [address] any warranty or liability for your use of this information. Content Version: 1.0.81997; Last Revised: January 11, 2010  PROPER SLEEP HYGIENE    What to avoid  · Do not have drinks with caffeine, such as coffee or black tea, for 8 hours before bed. · Do not smoke or use other types of tobacco near bedtime. Nicotine is a stimulant and can keep you awake. · Avoid drinking alcohol late in the evening, because it can cause you to wake in the middle of the night. · Do not eat a big meal close to bedtime. If you are hungry, eat a light snack. · Do not drink a lot of water close to bedtime, because the need to urinate may wake you up during the night. · Do not read or watch TV in bed. Use the bed only for sleeping and sexual activity. What to try  · Go to bed at the same time every night, and wake up at the same time every morning. Do not take naps during the day. · Keep your bedroom quiet, dark, and cool. · Get regular exercise, but not within 3 to 4 hours of your bedtime. .  · Sleep on a comfortable pillow and mattress.   · If watching the clock makes you anxious, turn it facing away from you so you cannot see the time. · If you worry when you lie down, start a worry book. Well before bedtime, write down your worries, and then set the book and your concerns aside. · Try meditation or other relaxation techniques before you go to bed. · If you cannot fall asleep, get up and go to another room until you feel sleepy. Do something relaxing. Repeat your bedtime routine before you go to bed again. · Make your house quiet and calm about an hour before bedtime. Turn down the lights, turn off the TV, log off the computer, and turn down the volume on music. This can help you relax after a busy day. Drowsy Driving: The Count includes the [facility] cites drowsiness as a causing factor in more than 452,048 police reported crashes annually, resulting in 76,000 injuries and 1,500 deaths. Other surveys suggest 55% of people polled have driven while drowsy in the past year, 23% had fallen asleep but not crashed, 3% crashed, and 2% had and accident due to drowsy driving. Who is at risk? Young Drivers: One study of drowsy driving accidents states that 55% of the drivers were under 25 years. Of those, 75% were male. Shift Workers and Travelers: People who work overnight or travel across time zones frequently are at higher risk of experiencing Circadian Rhythm Disorders. They are trying to work and function when their body is programed to sleep. Sleep Deprived: Lack of sleep has a serious impact on your ability to pay attention or focus on a task. Consistently getting less than the average of 8 hours your body needs creates partial or cumulative sleep deprivation. Untreated Sleep Disorders: Sleep Apnea, Narcolepsy, R.L.S., and other sleep disorders (untreated) prevent a person from getting enough restful sleep. This leads to excessive daytime sleepiness and increases the risk for drowsy driving accidents by up to 7 times.   Medications / Alcohol: Even over the counter medications can cause drowsiness. Medications that impair a drivers attention should have a warning label. Alcohol naturally makes you sleepy and on its own can cause accidents. Combined with excessive drowsiness its effects are amplified. Signs of Drowsy Driving:   * You don't remember driving the last few miles   * You may drift out of your kylah   * You are unable to focus and your thoughts wander   * You may yawn more often than normal   * You have difficulty keeping your eyes open / nodding off   * Missing traffic signs, speeding, or tailgating  Prevention-   Good sleep hygiene, lifestyle and behavioral choices have the most impact on drowsy driving. There is no substitute for sleep and the average person requires 8 hours nightly. If you find yourself driving drowsy, stop and sleep. Consider the sleep hygiene tips provided during your visit as well. Medication Refill Policy: Refills for all medications require 1 week advance notice. Please have your pharmacy fax a refill request. We are unable to fax, or call in \"controled substance\" medications and you will need to pick these prescriptions up from our office. [employer] Activation    Thank you for requesting access to [employer]. Please follow the instructions below to securely access and download your online medical record. [employer] allows you to send messages to your doctor, view your test results, renew your prescriptions, schedule appointments, and more. How Do I Sign Up? 1. In your internet browser, go to [email or web address]. 2. Click on the First Time User? Click Here link in the Sign In box. You will see the New Member Sign Up page. 3. Enter your [employer] Access Code exactly as it appears below. You will not need to use this code after youve completed the sign-up process. If you do not sign up before the expiration date, you must request a new code. [employer] Access Code:  Activation code not generated  Current [employer] Status: Active (This is the date your [employer] access code will )    4. Enter the last four digits of your Social Security Number (xxxx) and Date of Birth (mm/dd/yyyy) as indicated and click Submit. You will be taken to the next sign-up page. 5. Create a [employer] ID. This will be your [employer] login ID and cannot be changed, so think of one that is secure and easy to remember. 6. Create a [employer] password. You can change your password at any time. 7. Enter your Password Reset Question and Answer. This can be used at a later time if you forget your password. 8. Enter your e-mail address. You will receive e-mail notification when new information is available in [address] 9. Click Sign Up. You can now view and download portions of your medical record. 10. Click the Download Summary menu link to download a portable copy of your medical information. Additional Information    If you have questions, please call [phone]. Remember, [employer] is NOT to be used for urgent needs. For medical emergencies, dial 911.

## 2019-01-31 NOTE — PROGRESS NOTES
[address] [facility] [place]. [place], [address]  Tel.  [phone]  Fax. 100 [facility] [address]  [place], [address] [facility]  Tel.  [phone]  Fax. [phone] [address] [facility] [place] [address]   Tel.  [phone]  Fax. [phone]     S>Cira Carpio is a 76 y.o. female seen for a positive airway pressure follow-up. She reports no problems using the device. She is 96.7% compliant over the past 90 days. The following problems are identified:    Drowsiness no Problems exhaling no   Snoring no Forget to put on no   Mask Comfortable yes Can't fall asleep no   Dry Mouth no Mask falls off no   Air Leaking no Frequent awakenings no         She admits that her sleep has improved. Allergies   Allergen Reactions    Codeine Nausea Only     GI    Macrodantin [Nitrofurantoin Macrocrystalline] Itching    Tenoretic 100 [Atenolol-Chlorthalidone] Other (comments)     fatigue    Zocor [Simvastatin] Swelling       She has a current medication list which includes the following prescription(s): losartan-hydrochlorothiazide, metformin, cholecalciferol, multivitamin, stress formula, flaxseed oil, calcium, docosahexanoic acid/epa, red yeast rice extract, and cranberry conc/ascorbic acid. .      She  has a past medical history of Esophageal reflux (4/13/2010), Essential hypertension, benign (4/13/2010), Mixed hyperlipidemia (4/13/2010), Obesity, unspecified (4/13/2010), and CINTHIA (obstructive sleep apnea) (08-). [place] Sleepiness Score: 5   and Modified F.O.S.Q. Score Total / 2: 18   which reflect improved sleep quality over therapy time.     O>    Visit Vitals  /82   Pulse 75   Ht 5' 4\" (1.626 m)   Wt 222 lb 8 oz (100.9 kg)   SpO2 96%   BMI 38.19 kg/m²         General:   Not in acute distress   Eyes:  Anicteric sclerae, no obvious strabismus   Nose:  No obvious nasal septum deviation    Oropharynx:   Class 4 oropharyngeal outlet, thick tongue base, uvula not seen due to low-lying soft palate, narrow tonsilo-pharyngeal pilars   Tonsils:   tonsils are not visualized due to low-lying soft palate   Neck:   midline trachea   Chest/Lungs:  Equal lung expansion, clear on auscultation    CVS:  Normal rate, regular rhythm; no JVD   Skin:  Warm to touch; no obvious rashes   Neuro:  No focal deficits ; no obvious tremor    Psych:  Normal affect,  normal countenance;           A>    ICD-10-CM ICD-9-CM    1. CINTHIA (obstructive sleep apnea) G47.33 327.23 AMB SUPPLY ORDER   2. BMI 38.0-38.9,adult Z68.38 V85.38      AHI = 5.6 (2011). On Respironics :  APAP 4 -20 cmH2O. Compliant:      yes    Therapeutic Response:  Positive    P>    * We have recommended a dedicated weight loss through appropriate diet and an exercise regiment as significant weight reduction has been shown to reduce severity of obstructive sleep apnea. * Follow-up Disposition:  Return in about 1 year (around 1/31/2020), or if symptoms worsen or fail to improve. * She was asked to contact our office for any problems regarding PAP therapy. * Counseling was provided regarding the importance of regular PAP use and on proper sleep hygiene and safe driving. * Re-enforced proper and regular cleaning for the device. Thank you for allowing us to participate in your patient's medical care. Christin Walter MD, FAASM  Electronically signed.  01/31/19

## 2019-02-10 DIAGNOSIS — I10 ESSENTIAL HYPERTENSION, BENIGN: ICD-10-CM

## 2019-02-10 DIAGNOSIS — E11.9 CONTROLLED TYPE 2 DIABETES MELLITUS WITHOUT COMPLICATION, WITHOUT LONG-TERM CURRENT USE OF INSULIN (HCC): ICD-10-CM

## 2019-02-10 RX ORDER — LOSARTAN POTASSIUM AND HYDROCHLOROTHIAZIDE 25; 100 MG/1; MG/1
TABLET ORAL
Qty: 90 TAB | Refills: 0 | Status: SHIPPED | OUTPATIENT
Start: 2019-02-10 | End: 2019-05-12 | Stop reason: SDUPTHER

## 2019-02-10 RX ORDER — METFORMIN HYDROCHLORIDE 500 MG/1
TABLET ORAL
Qty: 90 TAB | Refills: 0 | Status: SHIPPED | OUTPATIENT
Start: 2019-02-10 | End: 2019-05-12 | Stop reason: SDUPTHER

## 2019-05-22 ENCOUNTER — TELEPHONE (OUTPATIENT)
Dept: PRIMARY CARE CLINIC | Age: 69
End: 2019-05-22

## 2019-05-23 NOTE — TELEPHONE ENCOUNTER
Spoke with Hope from [facility] and advised that the patient is pre diabetic and that we are not prescribing any medication for that at this time.

## 2019-06-10 DIAGNOSIS — E11.9 CONTROLLED TYPE 2 DIABETES MELLITUS WITHOUT COMPLICATION, WITHOUT LONG-TERM CURRENT USE OF INSULIN (HCC): ICD-10-CM

## 2019-06-10 RX ORDER — METFORMIN HYDROCHLORIDE 500 MG/1
TABLET ORAL
Qty: 30 TAB | Refills: 0 | Status: SHIPPED | OUTPATIENT
Start: 2019-06-10 | End: 2019-07-14 | Stop reason: SDUPTHER

## 2019-07-14 DIAGNOSIS — E11.9 CONTROLLED TYPE 2 DIABETES MELLITUS WITHOUT COMPLICATION, WITHOUT LONG-TERM CURRENT USE OF INSULIN (HCC): ICD-10-CM

## 2019-07-14 DIAGNOSIS — I10 ESSENTIAL HYPERTENSION, BENIGN: ICD-10-CM

## 2019-07-15 RX ORDER — METFORMIN HYDROCHLORIDE 500 MG/1
TABLET ORAL
Qty: 30 TAB | Refills: 0 | Status: SHIPPED | OUTPATIENT
Start: 2019-07-15 | End: 2019-08-11 | Stop reason: SDUPTHER

## 2019-07-15 RX ORDER — LOSARTAN POTASSIUM AND HYDROCHLOROTHIAZIDE 25; 100 MG/1; MG/1
TABLET ORAL
Qty: 30 TAB | Refills: 0 | Status: SHIPPED | OUTPATIENT
Start: 2019-07-15 | End: 2019-08-11 | Stop reason: SDUPTHER

## 2019-08-11 DIAGNOSIS — I10 ESSENTIAL HYPERTENSION, BENIGN: ICD-10-CM

## 2019-08-11 DIAGNOSIS — E11.9 CONTROLLED TYPE 2 DIABETES MELLITUS WITHOUT COMPLICATION, WITHOUT LONG-TERM CURRENT USE OF INSULIN (HCC): ICD-10-CM

## 2019-08-11 RX ORDER — METFORMIN HYDROCHLORIDE 500 MG/1
TABLET ORAL
Qty: 30 TAB | Refills: 0 | Status: SHIPPED | OUTPATIENT
Start: 2019-08-11 | End: 2019-09-08 | Stop reason: SDUPTHER

## 2019-08-11 RX ORDER — LOSARTAN POTASSIUM AND HYDROCHLOROTHIAZIDE 25; 100 MG/1; MG/1
TABLET ORAL
Qty: 30 TAB | Refills: 0 | Status: SHIPPED | OUTPATIENT
Start: 2019-08-11 | End: 2019-09-08 | Stop reason: SDUPTHER

## 2019-10-16 DIAGNOSIS — I10 ESSENTIAL HYPERTENSION, BENIGN: ICD-10-CM

## 2019-10-16 DIAGNOSIS — E11.9 CONTROLLED TYPE 2 DIABETES MELLITUS WITHOUT COMPLICATION, WITHOUT LONG-TERM CURRENT USE OF INSULIN (HCC): ICD-10-CM

## 2019-10-16 RX ORDER — LOSARTAN POTASSIUM AND HYDROCHLOROTHIAZIDE 25; 100 MG/1; MG/1
TABLET ORAL
Qty: 30 TAB | Refills: 0 | Status: SHIPPED | OUTPATIENT
Start: 2019-10-16 | End: 2019-11-10 | Stop reason: SDUPTHER

## 2019-10-16 RX ORDER — METFORMIN HYDROCHLORIDE 500 MG/1
TABLET ORAL
Qty: 30 TAB | Refills: 0 | Status: SHIPPED | OUTPATIENT
Start: 2019-10-16 | End: 2019-11-10 | Stop reason: SDUPTHER

## 2020-01-30 ENCOUNTER — DOCUMENTATION ONLY (OUTPATIENT)
Dept: SLEEP MEDICINE | Age: 70
End: 2020-01-30

## 2020-01-30 ENCOUNTER — OFFICE VISIT (OUTPATIENT)
Dept: SLEEP MEDICINE | Age: 70
End: 2020-01-30

## 2020-01-30 VITALS
OXYGEN SATURATION: 98 % | DIASTOLIC BLOOD PRESSURE: 87 MMHG | WEIGHT: 228 LBS | SYSTOLIC BLOOD PRESSURE: 181 MMHG | HEIGHT: 64 IN | HEART RATE: 80 BPM | TEMPERATURE: 97.6 F | BODY MASS INDEX: 38.93 KG/M2

## 2020-01-30 DIAGNOSIS — I10 ESSENTIAL HYPERTENSION: ICD-10-CM

## 2020-01-30 DIAGNOSIS — G47.33 OSA (OBSTRUCTIVE SLEEP APNEA): Primary | ICD-10-CM

## 2020-01-30 NOTE — PATIENT INSTRUCTIONS
[address] [facility] [place]. 1668 [facility], [address] Tel.  [phone] Fax. 100 [facility] 60 [place], [address] [facility] Tel.  [phone] Fax. [phone] [address] [address] [place] Tel.  [phone] Fax. [phone] Learning About CPAP for Sleep Apnea What is CPAP? CPAP is a small machine that you use at home every night while you sleep. It increases air pressure in your throat to keep your airway open. When you have sleep apnea, this can help you sleep better so you feel much better. CPAP stands for \"continuous positive airway pressure. \" The CPAP machine will have one of the following: · A mask that covers your nose and mouth · Prongs that fit into your nose · A mask that covers your nose only, the most common type. This type is called NCPAP. The N stands for \"nasal.\" Why is it done? CPAP is usually the best treatment for obstructive sleep apnea. It is the first treatment choice and the most widely used. Your doctor may suggest CPAP if you have: · Moderate to severe sleep apnea. · Sleep apnea and coronary artery disease (CAD) or heart failure. How does it help? · CPAP can help you have more normal sleep, so you feel less sleepy and more alert during the daytime. · CPAP may help keep heart failure or other heart problems from getting worse. · NCPAP may help lower your blood pressure. · If you use CPAP, your bed partner may also sleep better because you are not snoring or restless. What are the side effects? Some people who use CPAP have: · A dry or stuffy nose and a sore throat. · Irritated skin on the face. · Sore eyes. · Bloating. If you have any of these problems, work with your doctor to fix them. Here are some things you can try: · Be sure the mask or nasal prongs fit well. · See if your doctor can adjust the pressure of your CPAP. · If your nose is dry, try a humidifier. · If your nose is runny or stuffy, try decongestant medicine or a steroid nasal spray. If these things do not help, you might try a different type of machine. Some machines have air pressure that adjusts on its own. Others have air pressures that are different when you breathe in than when you breathe out. This may reduce discomfort caused by too much pressure in your nose. Where can you learn more? Go to videoNEXT.be Enter G667 in the search box to learn more about \"Learning About CPAP for Sleep Apnea. \"  
© 8358-0032 Healthwise, Incorporated. Care instructions adapted under license by [facility] (which disclaims liability or warranty for this information). This care instruction is for use with your licensed healthcare professional. If you have questions about a medical condition or this instruction, always ask your healthcare professional. [address] any warranty or liability for your use of this information. Content Version: 8.7.34390; Last Revised: January 11, 2010 PROPER SLEEP HYGIENE What to avoid · Do not have drinks with caffeine, such as coffee or black tea, for 8 hours before bed. · Do not smoke or use other types of tobacco near bedtime. Nicotine is a stimulant and can keep you awake. · Avoid drinking alcohol late in the evening, because it can cause you to wake in the middle of the night. · Do not eat a big meal close to bedtime. If you are hungry, eat a light snack. · Do not drink a lot of water close to bedtime, because the need to urinate may wake you up during the night. · Do not read or watch TV in bed. Use the bed only for sleeping and sexual activity. What to try · Go to bed at the same time every night, and wake up at the same time every morning. Do not take naps during the day. · Keep your bedroom quiet, dark, and cool. · Get regular exercise, but not within 3 to 4 hours of your bedtime. Charmaine Tom · Sleep on a comfortable pillow and mattress. · If watching the clock makes you anxious, turn it facing away from you so you cannot see the time. · If you worry when you lie down, start a worry book. Well before bedtime, write down your worries, and then set the book and your concerns aside. · Try meditation or other relaxation techniques before you go to bed. · If you cannot fall asleep, get up and go to another room until you feel sleepy. Do something relaxing. Repeat your bedtime routine before you go to bed again. · Make your house quiet and calm about an hour before bedtime. Turn down the lights, turn off the TV, log off the computer, and turn down the volume on music. This can help you relax after a busy day. Drowsy Driving: The [place] [address] cites drowsiness as a causing factor in more than 484,848 police reported crashes annually, resulting in 76,000 injuries and 1,500 deaths. Other surveys suggest 55% of people polled have driven while drowsy in the past year, 23% had fallen asleep but not crashed, 3% crashed, and 2% had and accident due to drowsy driving. Who is at risk? Young Drivers: One study of drowsy driving accidents states that 55% of the drivers were under 25 years. Of those, 75% were male. Shift Workers and Travelers: People who work overnight or travel across time zones frequently are at higher risk of experiencing Circadian Rhythm Disorders. They are trying to work and function when their body is programed to sleep. Sleep Deprived: Lack of sleep has a serious impact on your ability to pay attention or focus on a task. Consistently getting less than the average of 8 hours your body needs creates partial or cumulative sleep deprivation.   
Untreated Sleep Disorders: Sleep Apnea, Narcolepsy, R.L.S., and other sleep disorders (untreated) prevent a person from getting enough restful sleep. This leads to excessive daytime sleepiness and increases the risk for drowsy driving accidents by up to 7 times. Medications / Alcohol: Even over the counter medications can cause drowsiness. Medications that impair a drivers attention should have a warning label. Alcohol naturally makes you sleepy and on its own can cause accidents. Combined with excessive drowsiness its effects are amplified. Signs of Drowsy Driving: * You don't remember driving the last few miles * You may drift out of your kylah * You are unable to focus and your thoughts wander * You may yawn more often than normal 
 * You have difficulty keeping your eyes open / nodding off * Missing traffic signs, speeding, or tailgating Prevention-  
Good sleep hygiene, lifestyle and behavioral choices have the most impact on drowsy driving. There is no substitute for sleep and the average person requires 8 hours nightly. If you find yourself driving drowsy, stop and sleep. Consider the sleep hygiene tips provided during your visit as well. Medication Refill Policy: Refills for all medications require 1 week advance notice. Please have your pharmacy fax a refill request. We are unable to fax, or call in \"controled substance\" medications and you will need to pick these prescriptions up from our office. TopLog Activation Thank you for requesting access to TopLog. Please follow the instructions below to securely access and download your online medical record. TopLog allows you to send messages to your doctor, view your test results, renew your prescriptions, schedule appointments, and more. How Do I Sign Up? 1. In your internet browser, go to [email or web address] [email or web address]. 2. Click on the First Time User? Click Here link in the Sign In box. You will see the New Member Sign Up page. 3. Enter your TopLog Access Code exactly as it appears below.  You will not need to use this code after youve completed the sign-up process. If you do not sign up before the expiration date, you must request a new code. Quarterly Access Code: Activation code not generated Current Quarterly Status: Active (This is the date your Quarterly access code will ) 4. Enter the last four digits of your Social Security Number (xxxx) and Date of Birth (mm/dd/yyyy) as indicated and click Submit. You will be taken to the next sign-up page. 5. Create a H-caret ID. This will be your Quarterly login ID and cannot be changed, so think of one that is secure and easy to remember. 6. Create a Quarterly password. You can change your password at any time. 7. Enter your Password Reset Question and Answer. This can be used at a later time if you forget your password. 8. Enter your e-mail address. You will receive e-mail notification when new information is available in [address]. 9. Click Sign Up. You can now view and download portions of your medical record. 10. Click the Download Summary menu link to download a portable copy of your medical information. Additional Information If you have questions, please call [phone]. Remember, Quarterly is NOT to be used for urgent needs. For medical emergencies, dial 911.

## 2020-01-30 NOTE — PROGRESS NOTES
[address] [place]. [place], [address]  Tel.  [phone]  Fax. 100 [facility] 60  [place], [address]  Tel.  [phone]  Fax. [phone] 9250 [facility] Mushtaq Ruiz   Tel.  [phone]  Fax. [phone]     S>Cira Leblanc is a 71 y.o. female seen for a positive airway pressure follow-up. She reports no problems using the device. She is 00% compliant over the past 30 days. The following problems are identified:    Drowsiness no Problems exhaling no   Snoring no Forget to put on no   Mask Comfortable yes Can't fall asleep no   Dry Mouth no Mask falls off no   Air Leaking no Frequent awakenings no         She admits that her sleep has improved on PAP therapy using nasal pillows mask and non-heated tubing. Allergies   Allergen Reactions    Codeine Nausea Only     GI    Macrodantin [Nitrofurantoin Macrocrystalline] Itching    Tenoretic 100 [Atenolol-Chlorthalidone] Other (comments)     fatigue    Zocor [Simvastatin] Swelling       She has a current medication list which includes the following prescription(s): metformin, losartan-hydrochlorothiazide, cholecalciferol, cranberry conc/ascorbic acid, multivitamin, stress formula, flaxseed oil, calcium, docosahexanoic acid/epa, and red yeast rice extract. .      She  has a past medical history of Esophageal reflux (4/13/2010), Essential hypertension, benign (4/13/2010), Mixed hyperlipidemia (4/13/2010), Obesity, unspecified (4/13/2010), and CINTHIA (obstructive sleep apnea) (08-). [place] Sleepiness Score: 5   and Modified F.O.S.Q. Score Total / 2: 17.5   which reflect improved sleep quality over therapy time.     O>    Visit Vitals  Ht 5' 4\" (1.626 m)   Wt 228 lb (103.4 kg)   BMI 39.14 kg/m²         General:   Not in acute distress   Eyes:  Anicteric sclerae, no obvious strabismus   Nose:  No obvious nasal septum deviation    Oropharynx:   Class 4 oropharyngeal outlet, thick tongue base, uvula not seen due to low-lying soft palate, narrow tonsilo-pharyngeal pilars   Tonsils:   tonsils are not visualized due to low-lying soft palate   Neck:   midline trachea   Chest/Lungs:  Equal lung expansion, clear on auscultation    CVS:  Normal rate, regular rhythm; no JVD   Skin:  Warm to touch; no obvious rashes   Neuro:  No focal deficits ; no obvious tremor    Psych:  Normal affect,  normal countenance;           A>    ICD-10-CM ICD-9-CM    1. CINTHIA (obstructive sleep apnea) G47.33 327.23    2. BMI 38.0-38.9,adult Z68.38 V85.38    3. Essential hypertension I10 401.9      AHI = 5.6 (2011). On Respironics :  APAP 4 -20 cmH2O    Compliant:      yes    Therapeutic Response:  Positive    P>    Orders Placed This Encounter    AMB SUPPLY ORDER     Diagnosis: (G47.33) CINTHIA (obstructive sleep apnea)  (primary encounter diagnosis)     Replacement Supplies for Positive Airway Pressure Therapy Device:   Duration of need: 99 months.  Nasal Pillows Combo Mask (Replace) 2 per month.  Nasal Pillows (Replace) 2 per month.  Headgear 1 every 6 months.  Tubing 1 every 3 months.  Filter(s) Disposable 2 per month.  Filter(s) Non-Disposable 1 every 6 months. .   [address] [facility] for Humidifier (Replace) 1 every 6 months. Dora Cuba MD, FAASM; NPI: [id number]    Electronically signed. Date:- 01/30/20         * We have recommended a dedicated weight loss through appropriate diet and an exercise regiment as significant weight reduction has been shown to reduce severity of obstructive sleep apnea. *   Follow-up and Dispositions    · Return in about 1 year (around 1/30/2021), or if symptoms worsen or fail to improve. * She was asked to contact our office for any problems regarding PAP therapy. * Counseling was provided regarding the importance of regular PAP use and on proper sleep hygiene and safe driving.     * Re-enforced proper and regular cleaning for the device. Thank you for allowing us to participate in your patient's medical care. Sharon Hoffman MD, FAASM  Electronically signed.  01/30/20

## 2020-02-10 DIAGNOSIS — E11.9 CONTROLLED TYPE 2 DIABETES MELLITUS WITHOUT COMPLICATION, WITHOUT LONG-TERM CURRENT USE OF INSULIN (HCC): ICD-10-CM

## 2020-02-10 DIAGNOSIS — I10 ESSENTIAL HYPERTENSION, BENIGN: ICD-10-CM

## 2020-02-10 RX ORDER — METFORMIN HYDROCHLORIDE 500 MG/1
TABLET ORAL
Qty: 90 TAB | Refills: 0 | Status: SHIPPED | OUTPATIENT
Start: 2020-02-10 | End: 2020-05-03

## 2020-02-10 RX ORDER — LOSARTAN POTASSIUM AND HYDROCHLOROTHIAZIDE 25; 100 MG/1; MG/1
TABLET ORAL
Qty: 90 TAB | Refills: 0 | Status: SHIPPED | OUTPATIENT
Start: 2020-02-10 | End: 2020-05-03

## 2020-05-04 ENCOUNTER — TELEPHONE (OUTPATIENT)
Dept: PRIMARY CARE CLINIC | Age: 70
End: 2020-05-04

## 2020-05-04 ENCOUNTER — VIRTUAL VISIT (OUTPATIENT)
Dept: PRIMARY CARE CLINIC | Age: 70
End: 2020-05-04

## 2020-05-04 DIAGNOSIS — G47.33 SLEEP APNEA, OBSTRUCTIVE: ICD-10-CM

## 2020-05-04 DIAGNOSIS — E11.9 CONTROLLED TYPE 2 DIABETES MELLITUS WITHOUT COMPLICATION, WITHOUT LONG-TERM CURRENT USE OF INSULIN (HCC): Primary | ICD-10-CM

## 2020-05-04 DIAGNOSIS — E66.9 OBESITY (BMI 30-39.9): ICD-10-CM

## 2020-05-04 DIAGNOSIS — I10 ESSENTIAL HYPERTENSION, BENIGN: ICD-10-CM

## 2020-05-04 PROBLEM — E66.01 SEVERE OBESITY (BMI 35.0-39.9): Status: RESOLVED | Noted: 2018-09-20 | Resolved: 2020-05-04

## 2020-05-04 RX ORDER — LOSARTAN POTASSIUM AND HYDROCHLOROTHIAZIDE 25; 100 MG/1; MG/1
1 TABLET ORAL DAILY
Qty: 30 TAB | Refills: 0 | Status: SHIPPED | OUTPATIENT
Start: 2020-05-04 | End: 2020-06-03

## 2020-05-04 RX ORDER — METFORMIN HYDROCHLORIDE 500 MG/1
500 TABLET ORAL 2 TIMES DAILY WITH MEALS
Qty: 60 TAB | Refills: 0 | Status: SHIPPED | OUTPATIENT
Start: 2020-05-04 | End: 2020-06-03

## 2020-05-04 NOTE — TELEPHONE ENCOUNTER
Godwin Amin from [facility] called for clarification on the patient's Rx for Metformin. Patient is on her way now.  Please call Godwin Amin at [phone]

## 2020-05-04 NOTE — PROGRESS NOTES
Written by Michele Nguyen, as dictated by Dr. Som Fermin MD.    Leon Worrell is a 71 y.o. female. HPI   I was in the office while conducting this encounter. Consent:  She and/or her healthcare decision maker is aware that this patient-initiated Telehealth encounter is a billable service, with coverage as determined by her insurance carrier. She is aware that she may receive a bill and has provided verbal consent to proceed: Yes    This virtual visit was conducted via [address] Pursuant to the emergency declaration under the [address] [facility] waiver authority and the [employer] Act, this Virtual  Visit was conducted to reduce the patient's risk of exposure to COVID-19 and provide continuity of care for an established patient. Services were provided through a video synchronous discussion virtually to substitute for in-person clinic visit. Due to this being a TeleHealth evaluation, many elements of the physical examination are unable to be assessed. The patient presents today for a follow-up. She has not checked her BS in some time. She has been compliant on Metformin 500 mg. She weighed 228 lbs in January 2020 and now weighs 225 lbs. She has been trying to stay active and tries to go on walks every day. She is requesting a refill of Metformin 500 mg and would like to take BID because it curbs her appetite at dinner. She has not been checking her BP a home. She is compliant on Hyzaar 100-25 mg. She is requesting a refill on Hyzaar. She had a sleep study done in January 2020 and has been wearing a CPAP at night. Of note, she missed her [address] in August 2019.        Patient Active Problem List   Diagnosis Code    Essential hypertension, benign I10    Mixed hyperlipidemia E78.2    Esophageal reflux K21.9    Obesity, unspecified E66.9    Sleep apnea, obstructive G47.33    Severe obesity (BMI 35.0-39. 9) E66.01        Current Outpatient Medications on File Prior to Visit   Medication Sig Dispense Refill    metFORMIN (GLUCOPHAGE) 500 mg tablet TAKE 1 TABLET BY MOUTH IN THE MORNING WITH MORNING BREAKFAST 90 Tab 0    losartan-hydroCHLOROthiazide (HYZAAR) 100-25 mg per tablet TAKE 1 TABLET BY MOUTH ONE TIME DAILY 90 Tab 0    red yeast rice extract 600 mg cap Take 600 mg by mouth now.  Cholecalciferol, Vitamin D3, (VITAMIN D) 1,000 unit Cap Take  by mouth.  VIT C/VITAMIN E ACETATE/CRANB (CRANBERRY CONC-ASCORBIC ACID PO) Take  by mouth.  multivitamin, stress formula (STRESS TAB) tablet Take 1 Tab by mouth daily.  FLAXSEED OIL PO Take  by mouth.  CALCIUM PO Take 500 mg by mouth two (2) times a day.  DOCOSAHEXANOIC ACID/EPA (FISH OIL PO) Take 1,000 mg by mouth daily. 2 tabs qd       No current facility-administered medications on file prior to visit.         Allergies   Allergen Reactions    Codeine Nausea Only     GI    Macrodantin [Nitrofurantoin Macrocrystalline] Itching    Tenoretic 100 [Atenolol-Chlorthalidone] Other (comments)     fatigue    Zocor [Simvastatin] Swelling       Past Medical History:   Diagnosis Date    Esophageal reflux 4/13/2010    Essential hypertension, benign 4/13/2010    Mixed hyperlipidemia 4/13/2010    Obesity, unspecified 4/13/2010    CINTHIA (obstructive sleep apnea) 08-    AHI: 5.6 per hour       Past Surgical History:   Procedure Laterality Date    BREAST SURGERY PROCEDURE UNLISTED      breast biopsy    HX GYN      HX TUBAL LIGATION         Family History   Problem Relation Age of Onset    Cancer Mother         breast    Cancer Brother         leukemia    Cancer Sister        Social History     Socioeconomic History    Marital status:      Spouse name: Not on file    Number of children: Not on file    Years of education: Not on file    Highest education level: Not on file   Occupational History    Not on file   Social Needs    Financial resource strain: Not on file    Food insecurity     Worry: Not on file     Inability: Not on file    Transportation needs     Medical: Not on file     Non-medical: Not on file   Tobacco Use    Smoking status: Never Smoker    Smokeless tobacco: Never Used   Substance and Sexual Activity    Alcohol use: No    Drug use: No    Sexual activity: Not Currently   Lifestyle    Physical activity     Days per week: Not on file     Minutes per session: Not on file    Stress: Not on file   Relationships    Social connections     Talks on phone: Not on file     Gets together: Not on file     Attends Mor[employer] service: Not on file     Active member of club or organization: Not on file     Attends meetings of clubs or organizations: Not on file     Relationship status: Not on file    Intimate partner violence     Fear of current or ex partner: Not on file     Emotionally abused: Not on file     Physically abused: Not on file     Forced sexual activity: Not on file   Other Topics Concern     Service No    Blood Transfusions No    Caffeine Concern No     Comment: 2 cups of coffee daily    Occupational Exposure No    Hobby Hazards No    Sleep Concern No    Stress Concern No    Weight Concern Yes    Special Diet No    Back Care Yes    Exercise No    Bike Helmet No    Seat Belt Yes    Self-Exams No   Social History Narrative    Not on file       No visits with results within 3 Month(s) from this visit.    Latest known visit with results is:   Office Visit on 09/20/2018   Component Date Value Ref Range Status    Glucose 09/20/2018 111* 65 - 99 mg/dL Final    BUN 09/20/2018 18  8 - 27 mg/dL Final    Creatinine 09/20/2018 0.74  0.57 - 1.00 mg/dL Final    GFR est non-AA 09/20/2018 84  >59 mL/min/1.73 Final    GFR est AA 09/20/2018 97  >59 mL/min/1.73 Final    BUN/Creatinine ratio 09/20/2018 24  12 - 28 Final    Sodium 09/20/2018 142  134 - 144 mmol/L Final    Potassium 09/20/2018 4.3  3.5 - 5.2 mmol/L Final    Chloride 09/20/2018 101  96 - 106 mmol/L Final    CO2 09/20/2018 25  20 - 29 mmol/L Final    Calcium 09/20/2018 9.5  8.7 - 10.3 mg/dL Final    Protein, total 09/20/2018 7.0  6.0 - 8.5 g/dL Final    Albumin 09/20/2018 4.8  3.6 - 4.8 g/dL Final    GLOBULIN, TOTAL 09/20/2018 2.2  1.5 - 4.5 g/dL Final    A-G Ratio 09/20/2018 2.2  1.2 - 2.2 Final    Bilirubin, total 09/20/2018 0.5  0.0 - 1.2 mg/dL Final    Alk. phosphatase 09/20/2018 72  39 - 117 IU/L Final    AST (SGOT) 09/20/2018 16  0 - 40 IU/L Final    ALT (SGPT) 09/20/2018 11  0 - 32 IU/L Final    WBC 09/20/2018 5.2  3.4 - 10.8 x10E3/uL Final    RBC 09/20/2018 4.30  3.77 - 5.28 x10E6/uL Final    HGB 09/20/2018 14.0  11.1 - 15.9 g/dL Final    HCT 09/20/2018 40.5  34.0 - 46.6 % Final    MCV 09/20/2018 94  79 - 97 fL Final    MCH 09/20/2018 32.6  26.6 - 33.0 pg Final    MCHC 09/20/2018 34.6  31.5 - 35.7 g/dL Final    RDW 09/20/2018 13.2  12.3 - 15.4 % Final    PLATELET 07/79/2149 428  150 - 379 x10E3/uL Final    Cholesterol, total 09/20/2018 235* 100 - 199 mg/dL Final    Triglyceride 09/20/2018 120  0 - 149 mg/dL Final    HDL Cholesterol 09/20/2018 60  >39 mg/dL Final    VLDL, calculated 09/20/2018 24  5 - 40 mg/dL Final    LDL, calculated 09/20/2018 151* 0 - 99 mg/dL Final    Hemoglobin A1c 09/20/2018 6.4* 4.8 - 5.6 % Final    Comment:          Prediabetes: 5.7 - 6.4           Diabetes: >6.4           Glycemic control for adults with diabetes: <7.0      Estimated average glucose 09/20/2018 137  mg/dL Final    TSH 09/20/2018 1.820  0.450 - 4.500 uIU/mL Final     Review of Systems   Constitutional: Positive for weight loss. Negative for malaise/fatigue. HENT: Negative for congestion. Eyes: Negative for blurred vision. Respiratory: Negative for shortness of breath. Cardiovascular: Negative for chest pain and leg swelling.    Gastrointestinal: Negative for constipation, diarrhea and heartburn. Genitourinary: Negative for dysuria, frequency and urgency. Musculoskeletal: Negative for joint pain and myalgias. Neurological: Negative for dizziness and headaches. Psychiatric/Behavioral: Negative for depression and substance abuse. The patient is not nervous/anxious and does not have insomnia. Physical Exam  Constitutional:       General: She is not in acute distress. Appearance: She is well-developed. She is not diaphoretic. HENT:      Head: Normocephalic and atraumatic. Nose: No congestion. Eyes:      General:         Right eye: No discharge. Left eye: No discharge. Conjunctiva/sclera: Conjunctivae normal.   Pulmonary:      Effort: Pulmonary effort is normal. No respiratory distress. Neurological:      Mental Status: She is alert and oriented to person, place, and time. Psychiatric:         Behavior: Behavior normal.         Thought Content: Thought content normal.         ASSESSMENT and PLAN    ICD-10-CM ICD-9-CM    1. Controlled type 2 diabetes mellitus without complication, without long-term current use of insulin ([facility]) H49.0 433.06 METABOLIC PANEL, COMPREHENSIVE    Metabolic panel ordered       CBC W/O DIFF    CBC ordered      HEMOGLOBIN A1C WITH EAG    Hemoglobin A1C ordered       LIPID PANEL    Lipid panel ordered       metFORMIN (GLUCOPHAGE) 500 mg tablet sent to pharmacy     Metformin 500 mg refilled     Pt can take BID    2. Sleep apnea, obstructive G47.33 327.23 Stable at this time. Pt does wear CPAP at night    3. Essential hypertension, benign I10 401.1 losartan-hydroCHLOROthiazide (HYZAAR) 100-25 mg per tablet sent to pharmacy     Hyzaar 100-25 mg refilled    Advised pt to check her BP at home and keep a log so she can let me know what her BP readings are    4. Obesity (BMI 30-39. 9) E66.9 278.00 Encouraged pt to eat healthy and      This plan was reviewed with the patient and patient agrees.  All questions were answered. This scribe documentation was reviewed by me and accurately reflects the examination and decisions made by me. Total Time: minutes: 11-20  minutes    This note will not be viewable in [employer].

## 2020-06-05 DIAGNOSIS — E11.9 CONTROLLED TYPE 2 DIABETES MELLITUS WITHOUT COMPLICATION, WITHOUT LONG-TERM CURRENT USE OF INSULIN (HCC): ICD-10-CM

## 2020-06-06 LAB
ALBUMIN SERPL-MCNC: 4.6 G/DL (ref 3.8–4.8)
ALBUMIN/GLOB SERPL: 2.2 {RATIO} (ref 1.2–2.2)
ALP SERPL-CCNC: 81 IU/L (ref 39–117)
ALT SERPL-CCNC: 28 IU/L (ref 0–32)
AST SERPL-CCNC: 25 IU/L (ref 0–40)
BILIRUB SERPL-MCNC: 0.7 MG/DL (ref 0–1.2)
BUN SERPL-MCNC: 13 MG/DL (ref 8–27)
BUN/CREAT SERPL: 16 (ref 12–28)
CALCIUM SERPL-MCNC: 9.5 MG/DL (ref 8.7–10.3)
CHLORIDE SERPL-SCNC: 99 MMOL/L (ref 96–106)
CHOLEST SERPL-MCNC: 219 MG/DL (ref 100–199)
CO2 SERPL-SCNC: 25 MMOL/L (ref 20–29)
CREAT SERPL-MCNC: 0.82 MG/DL (ref 0.57–1)
ERYTHROCYTE [DISTWIDTH] IN BLOOD BY AUTOMATED COUNT: 12.6 % (ref 11.7–15.4)
EST. AVERAGE GLUCOSE BLD GHB EST-MCNC: 200 MG/DL
GLOBULIN SER CALC-MCNC: 2.1 G/DL (ref 1.5–4.5)
GLUCOSE SERPL-MCNC: 145 MG/DL (ref 65–99)
HBA1C MFR BLD: 8.6 % (ref 4.8–5.6)
HCT VFR BLD AUTO: 41.9 % (ref 34–46.6)
HDLC SERPL-MCNC: 48 MG/DL
HGB BLD-MCNC: 14.1 G/DL (ref 11.1–15.9)
LDLC SERPL CALC-MCNC: 140 MG/DL (ref 0–99)
MCH RBC QN AUTO: 31.8 PG (ref 26.6–33)
MCHC RBC AUTO-ENTMCNC: 33.7 G/DL (ref 31.5–35.7)
MCV RBC AUTO: 94 FL (ref 79–97)
PLATELET # BLD AUTO: 348 X10E3/UL (ref 150–450)
POTASSIUM SERPL-SCNC: 4.1 MMOL/L (ref 3.5–5.2)
PROT SERPL-MCNC: 6.7 G/DL (ref 6–8.5)
RBC # BLD AUTO: 4.44 X10E6/UL (ref 3.77–5.28)
SODIUM SERPL-SCNC: 139 MMOL/L (ref 134–144)
TRIGL SERPL-MCNC: 157 MG/DL (ref 0–149)
VLDLC SERPL CALC-MCNC: 31 MG/DL (ref 5–40)
WBC # BLD AUTO: 5.7 X10E3/UL (ref 3.4–10.8)

## 2020-06-08 DIAGNOSIS — E11.9 CONTROLLED TYPE 2 DIABETES MELLITUS WITHOUT COMPLICATION, WITHOUT LONG-TERM CURRENT USE OF INSULIN (HCC): ICD-10-CM

## 2020-06-08 NOTE — PROGRESS NOTES
Alisha Pump, your diabetes number (HbA1C) hs gone way up. Are you taking metformin daily or twice a day? Cholesterol numbers are still on the high side.

## 2020-06-09 ENCOUNTER — VIRTUAL VISIT (OUTPATIENT)
Dept: PRIMARY CARE CLINIC | Age: 70
End: 2020-06-09

## 2020-06-09 DIAGNOSIS — E11.9 CONTROLLED TYPE 2 DIABETES MELLITUS WITHOUT COMPLICATION, WITHOUT LONG-TERM CURRENT USE OF INSULIN (HCC): Primary | ICD-10-CM

## 2020-06-09 DIAGNOSIS — I10 ESSENTIAL HYPERTENSION, BENIGN: ICD-10-CM

## 2020-06-09 RX ORDER — METFORMIN HYDROCHLORIDE 500 MG/1
500 TABLET ORAL 2 TIMES DAILY WITH MEALS
Qty: 60 TAB | Refills: 0 | OUTPATIENT
Start: 2020-06-09 | End: 2020-07-09

## 2020-06-09 RX ORDER — METFORMIN HYDROCHLORIDE 500 MG/1
500 TABLET ORAL 2 TIMES DAILY WITH MEALS
Qty: 180 TAB | Refills: 0 | Status: SHIPPED | OUTPATIENT
Start: 2020-06-09 | End: 2020-09-09 | Stop reason: SDUPTHER

## 2020-06-09 NOTE — PROGRESS NOTES
Written by Mj Laboy, as dictated by Dr. Melissa Ann MD.    Estrellita Stuart is a 71 y.o. female. HPI   I was in the office while conducting this encounter. Consent:  She and/or her healthcare decision maker is aware that this patient-initiated Telehealth encounter is a billable service, with coverage as determined by her insurance carrier. She is aware that she may receive a bill and has provided verbal consent to proceed: Yes    This virtual visit was conducted via [address] Pursuant to the emergency declaration under the [address] [facility] [address] waiver authority and the [employer] Act, this Virtual  Visit was conducted to reduce the patient's risk of exposure to COVID-19 and provide continuity of care for an established patient. Services were provided through a video synchronous discussion virtually to substitute for in-person clinic visit. Due to this being a TeleHealth evaluation, many elements of the physical examination are unable to be assessed. The patient is here for a lab work follow up. Her hgb A1c from 6/5/2020 was 8.6%. She has been taking her metformin 500 mg once a day, but was unable to get this refilled yesterday. She notes some diarrhea and cramping on metformin. She was previously taking metformin 500 mg BID and states her GI symptoms improved after a week of taking this medication. She is not monitoring her BG at home. She states she has lost some weight this month with diet improvements. She has been walking daily for exercise. Patient Active Problem List   Diagnosis Code    Essential hypertension, benign I10    Mixed hyperlipidemia E78.2    Esophageal reflux K21.9    Sleep apnea, obstructive G47.33        Current Outpatient Medications on File Prior to Visit   Medication Sig Dispense Refill    red yeast rice extract 600 mg cap Take 600 mg by mouth now.  Cholecalciferol, Vitamin D3, (VITAMIN D) 1,000 unit Cap Take  by mouth.  VIT C/VITAMIN E ACETATE/CRANB (CRANBERRY CONC-ASCORBIC ACID PO) Take  by mouth.  multivitamin, stress formula (STRESS TAB) tablet Take 1 Tab by mouth daily.  FLAXSEED OIL PO Take  by mouth.  CALCIUM PO Take 500 mg by mouth two (2) times a day.  DOCOSAHEXANOIC ACID/EPA (FISH OIL PO) Take 1,000 mg by mouth daily. 2 tabs qd       No current facility-administered medications on file prior to visit.         Allergies   Allergen Reactions    Codeine Nausea Only     GI    Macrodantin [Nitrofurantoin Macrocrystalline] Itching    Tenoretic 100 [Atenolol-Chlorthalidone] Other (comments)     fatigue    Zocor [Simvastatin] Swelling       Past Medical History:   Diagnosis Date    Esophageal reflux 4/13/2010    Essential hypertension, benign 4/13/2010    Mixed hyperlipidemia 4/13/2010    Obesity, unspecified 4/13/2010    CINTHIA (obstructive sleep apnea) 08-    AHI: 5.6 per hour       Past Surgical History:   Procedure Laterality Date    BREAST SURGERY PROCEDURE UNLISTED      breast biopsy    HX GYN      HX TUBAL LIGATION         Family History   Problem Relation Age of Onset    Cancer Mother         breast    Cancer Brother         leukemia    Cancer Sister        Social History     Socioeconomic History    Marital status:      Spouse name: Not on file    Number of children: Not on file    Years of education: Not on file    Highest education level: Not on file   Occupational History    Not on file   Social Needs    Financial resource strain: Not on file    Food insecurity     Worry: Not on file     Inability: Not on file    Transportation needs     Medical: Not on file     Non-medical: Not on file   Tobacco Use    Smoking status: Never Smoker    Smokeless tobacco: Never Used   Substance and Sexual Activity    Alcohol use: No    Drug use: No    Sexual activity: Not Currently   Lifestyle    Physical activity     Days per week: Not on file     Minutes per session: Not on file    Stress: Not on file   Relationships    Social connections     Talks on phone: Not on file     Gets together: Not on file     Attends Christian service: Not on file     Active member of club or organization: Not on file     Attends meetings of clubs or organizations: Not on file     Relationship status: Not on file    Intimate partner violence     Fear of current or ex partner: Not on file     Emotionally abused: Not on file     Physically abused: Not on file     Forced sexual activity: Not on file   Other Topics Concern     Service No    Blood Transfusions No    Caffeine Concern No     Comment: 2 cups of coffee daily    Occupational Exposure No    Hobby Hazards No    Sleep Concern No    Stress Concern No    Weight Concern Yes    Special Diet No    Back Care Yes    Exercise No    Bike Helmet No    Seat Belt Yes    Self-Exams No   Social History Narrative    Not on file       Orders Only on 06/05/2020   Component Date Value Ref Range Status    Glucose 06/05/2020 145* 65 - 99 mg/dL Final    BUN 06/05/2020 13  8 - 27 mg/dL Final    Creatinine 06/05/2020 0.82  0.57 - 1.00 mg/dL Final    GFR est non-AA 06/05/2020 73  >59 mL/min/1.73 Final    GFR est AA 06/05/2020 84  >59 mL/min/1.73 Final    BUN/Creatinine ratio 06/05/2020 16  12 - 28 Final    Sodium 06/05/2020 139  134 - 144 mmol/L Final    Potassium 06/05/2020 4.1  3.5 - 5.2 mmol/L Final    Chloride 06/05/2020 99  96 - 106 mmol/L Final    CO2 06/05/2020 25  20 - 29 mmol/L Final    Calcium 06/05/2020 9.5  8.7 - 10.3 mg/dL Final    Protein, total 06/05/2020 6.7  6.0 - 8.5 g/dL Final    Albumin 06/05/2020 4.6  3.8 - 4.8 g/dL Final    GLOBULIN, TOTAL 06/05/2020 2.1  1.5 - 4.5 g/dL Final    A-G Ratio 06/05/2020 2.2  1.2 - 2.2 Final    Bilirubin, total 06/05/2020 0.7  0.0 - 1.2 mg/dL Final    Alk.  phosphatase 06/05/2020 81  39 - 117 IU/L Final  AST (SGOT) 06/05/2020 25  0 - 40 IU/L Final    ALT (SGPT) 06/05/2020 28  0 - 32 IU/L Final    WBC 06/05/2020 5.7  3.4 - 10.8 x10E3/uL Final    RBC 06/05/2020 4.44  3.77 - 5.28 x10E6/uL Final    HGB 06/05/2020 14.1  11.1 - 15.9 g/dL Final    HCT 06/05/2020 41.9  34.0 - 46.6 % Final    MCV 06/05/2020 94  79 - 97 fL Final    MCH 06/05/2020 31.8  26.6 - 33.0 pg Final    MCHC 06/05/2020 33.7  31.5 - 35.7 g/dL Final    RDW 06/05/2020 12.6  11.7 - 15.4 % Final    PLATELET 42/62/8619 623  150 - 450 x10E3/uL Final    Cholesterol, total 06/05/2020 219* 100 - 199 mg/dL Final    Triglyceride 06/05/2020 157* 0 - 149 mg/dL Final    HDL Cholesterol 06/05/2020 48  >39 mg/dL Final    VLDL, calculated 06/05/2020 31  5 - 40 mg/dL Final    LDL, calculated 06/05/2020 140* 0 - 99 mg/dL Final    Hemoglobin A1c 06/05/2020 8.6* 4.8 - 5.6 % Final    Comment:          Prediabetes: 5.7 - 6.4           Diabetes: >6.4           Glycemic control for adults with diabetes: <7.0      Estimated average glucose 06/05/2020 200  mg/dL Final     Review of Systems   Constitutional: Negative for fever and malaise/fatigue. HENT: Negative for congestion. Eyes: Negative for blurred vision and double vision. Respiratory: Negative for cough and shortness of breath. Cardiovascular: Negative for chest pain and leg swelling. Gastrointestinal: Negative for abdominal pain, blood in stool and constipation. Neurological: Negative for sensory change and headaches. Psychiatric/Behavioral: Negative for depression. The patient does not have insomnia. There were no vitals taken for this visit. Physical Exam  Vitals signs and nursing note reviewed. Constitutional:       Appearance: Normal appearance. HENT:      Nose: No congestion or rhinorrhea. Neck:      Musculoskeletal: Normal range of motion and neck supple. Pulmonary:      Comments: Effort normal  No respiratory distress  Neurological:      Mental Status: She is alert. Psychiatric:         Mood and Affect: Mood normal.         Behavior: Behavior normal.         ASSESSMENT and PLAN    ICD-10-CM ICD-9-CM    1. Controlled type 2 diabetes mellitus without complication, without long-term current use of insulin (HCC) E11.9 250.00 metFORMIN (GLUCOPHAGE) 500 mg tablet sent to pharmacy    Increased metformin dose from 500 mg daily to 1000 mg daily. Pt should take metformin 500 mg BID. 2. Essential hypertension, benign I10 401.1 Stable at this time. No changes needed to medication. This plan was reviewed with the patient and patient agrees. All questions were answered. This scribe documentation was reviewed by me and accurately reflects the examination and decisions made by me.

## 2020-06-09 NOTE — TELEPHONE ENCOUNTER
LVM for patient to return call to discuss labs and medication adjustment can do virtual visit does not need to come to the office.

## 2020-08-14 RX ORDER — HYDROCHLOROTHIAZIDE 25 MG/1
TABLET ORAL
Qty: 90 TAB | Refills: 0 | Status: SHIPPED | OUTPATIENT
Start: 2020-08-14 | End: 2020-09-08 | Stop reason: DRUGHIGH

## 2020-08-18 ENCOUNTER — TELEPHONE (OUTPATIENT)
Dept: PRIMARY CARE CLINIC | Age: 70
End: 2020-08-18

## 2020-08-18 DIAGNOSIS — E11.9 CONTROLLED TYPE 2 DIABETES MELLITUS WITHOUT COMPLICATION, WITHOUT LONG-TERM CURRENT USE OF INSULIN (HCC): Primary | ICD-10-CM

## 2020-08-18 DIAGNOSIS — E78.2 MIXED HYPERLIPIDEMIA: ICD-10-CM

## 2020-08-18 DIAGNOSIS — I10 ESSENTIAL HYPERTENSION, BENIGN: ICD-10-CM

## 2020-08-18 NOTE — TELEPHONE ENCOUNTER
Pt has Physical on 9/8/2020 and would like to come the week prior to do labs. Please place routine labs.

## 2020-09-02 DIAGNOSIS — E11.9 CONTROLLED TYPE 2 DIABETES MELLITUS WITHOUT COMPLICATION, WITHOUT LONG-TERM CURRENT USE OF INSULIN (HCC): ICD-10-CM

## 2020-09-02 DIAGNOSIS — I10 ESSENTIAL HYPERTENSION, BENIGN: ICD-10-CM

## 2020-09-02 DIAGNOSIS — E78.2 MIXED HYPERLIPIDEMIA: ICD-10-CM

## 2020-09-02 LAB
ALBUMIN SERPL-MCNC: 4 G/DL (ref 3.5–5)
ALBUMIN/GLOB SERPL: 1.4 {RATIO} (ref 1.1–2.2)
ALP SERPL-CCNC: 80 U/L (ref 45–117)
ALT SERPL-CCNC: 24 U/L (ref 12–78)
ANION GAP SERPL CALC-SCNC: 6 MMOL/L (ref 5–15)
AST SERPL-CCNC: 13 U/L (ref 15–37)
BASOPHILS # BLD: 0.1 K/UL (ref 0–0.1)
BASOPHILS NFR BLD: 1 % (ref 0–1)
BILIRUB SERPL-MCNC: 0.7 MG/DL (ref 0.2–1)
BUN SERPL-MCNC: 14 MG/DL (ref 6–20)
BUN/CREAT SERPL: 18 (ref 12–20)
CALCIUM SERPL-MCNC: 9.2 MG/DL (ref 8.5–10.1)
CHLORIDE SERPL-SCNC: 105 MMOL/L (ref 97–108)
CHOLEST SERPL-MCNC: 170 MG/DL
CO2 SERPL-SCNC: 29 MMOL/L (ref 21–32)
CREAT SERPL-MCNC: 0.78 MG/DL (ref 0.55–1.02)
DIFFERENTIAL METHOD BLD: NORMAL
EOSINOPHIL # BLD: 0.2 K/UL (ref 0–0.4)
EOSINOPHIL NFR BLD: 4 % (ref 0–7)
ERYTHROCYTE [DISTWIDTH] IN BLOOD BY AUTOMATED COUNT: 12.6 % (ref 11.5–14.5)
EST. AVERAGE GLUCOSE BLD GHB EST-MCNC: 85 MG/DL
GLOBULIN SER CALC-MCNC: 2.8 G/DL (ref 2–4)
GLUCOSE SERPL-MCNC: 126 MG/DL (ref 65–100)
HBA1C MFR BLD: 4.6 % (ref 4–5.6)
HCT VFR BLD AUTO: 40.6 % (ref 35–47)
HDLC SERPL-MCNC: 56 MG/DL
HDLC SERPL: 3 {RATIO} (ref 0–5)
HGB BLD-MCNC: 13.1 G/DL (ref 11.5–16)
IMM GRANULOCYTES # BLD AUTO: 0 K/UL (ref 0–0.04)
IMM GRANULOCYTES NFR BLD AUTO: 0 % (ref 0–0.5)
LDLC SERPL CALC-MCNC: 85 MG/DL (ref 0–100)
LIPID PROFILE,FLP: NORMAL
LYMPHOCYTES # BLD: 2 K/UL (ref 0.8–3.5)
LYMPHOCYTES NFR BLD: 35 % (ref 12–49)
MCH RBC QN AUTO: 31.3 PG (ref 26–34)
MCHC RBC AUTO-ENTMCNC: 32.3 G/DL (ref 30–36.5)
MCV RBC AUTO: 97.1 FL (ref 80–99)
MONOCYTES # BLD: 0.4 K/UL (ref 0–1)
MONOCYTES NFR BLD: 6 % (ref 5–13)
NEUTS SEG # BLD: 3 K/UL (ref 1.8–8)
NEUTS SEG NFR BLD: 54 % (ref 32–75)
NRBC # BLD: 0 K/UL (ref 0–0.01)
NRBC BLD-RTO: 0 PER 100 WBC
PLATELET # BLD AUTO: 335 K/UL (ref 150–400)
PMV BLD AUTO: 10.7 FL (ref 8.9–12.9)
POTASSIUM SERPL-SCNC: 4.1 MMOL/L (ref 3.5–5.1)
PROT SERPL-MCNC: 6.8 G/DL (ref 6.4–8.2)
RBC # BLD AUTO: 4.18 M/UL (ref 3.8–5.2)
SODIUM SERPL-SCNC: 140 MMOL/L (ref 136–145)
TRIGL SERPL-MCNC: 145 MG/DL (ref ?–150)
TSH SERPL DL<=0.05 MIU/L-ACNC: 1.73 UIU/ML (ref 0.36–3.74)
VLDLC SERPL CALC-MCNC: 29 MG/DL
WBC # BLD AUTO: 5.6 K/UL (ref 3.6–11)

## 2020-09-08 ENCOUNTER — OFFICE VISIT (OUTPATIENT)
Dept: PRIMARY CARE CLINIC | Age: 70
End: 2020-09-08
Payer: MEDICARE

## 2020-09-08 VITALS
BODY MASS INDEX: 35.78 KG/M2 | HEIGHT: 64 IN | TEMPERATURE: 97.1 F | DIASTOLIC BLOOD PRESSURE: 82 MMHG | RESPIRATION RATE: 15 BRPM | HEART RATE: 71 BPM | WEIGHT: 209.6 LBS | SYSTOLIC BLOOD PRESSURE: 138 MMHG | OXYGEN SATURATION: 97 %

## 2020-09-08 DIAGNOSIS — Z23 ENCOUNTER FOR IMMUNIZATION: ICD-10-CM

## 2020-09-08 DIAGNOSIS — G47.33 SLEEP APNEA, OBSTRUCTIVE: ICD-10-CM

## 2020-09-08 DIAGNOSIS — Z71.89 ACP (ADVANCE CARE PLANNING): ICD-10-CM

## 2020-09-08 DIAGNOSIS — I10 ESSENTIAL HYPERTENSION, BENIGN: ICD-10-CM

## 2020-09-08 DIAGNOSIS — Z00.00 MEDICARE ANNUAL WELLNESS VISIT, SUBSEQUENT: Primary | ICD-10-CM

## 2020-09-08 DIAGNOSIS — E11.9 CONTROLLED TYPE 2 DIABETES MELLITUS WITHOUT COMPLICATION, WITHOUT LONG-TERM CURRENT USE OF INSULIN (HCC): ICD-10-CM

## 2020-09-08 DIAGNOSIS — E66.9 OBESITY (BMI 30-39.9): ICD-10-CM

## 2020-09-08 DIAGNOSIS — Z23 NEED FOR PNEUMOCOCCAL VACCINATION: ICD-10-CM

## 2020-09-08 LAB
ALBUMIN UR QL STRIP: 10 MG/L
CREATININE, URINE POC: 10 MG/DL
HBA1C MFR BLD HPLC: 5.8 %
MICROALBUMIN/CREAT RATIO POC: <30 MG/G

## 2020-09-08 PROCEDURE — G0009 ADMIN PNEUMOCOCCAL VACCINE: HCPCS | Performed by: INTERNAL MEDICINE

## 2020-09-08 PROCEDURE — G8752 SYS BP LESS 140: HCPCS | Performed by: INTERNAL MEDICINE

## 2020-09-08 PROCEDURE — G0008 ADMIN INFLUENZA VIRUS VAC: HCPCS | Performed by: INTERNAL MEDICINE

## 2020-09-08 PROCEDURE — 82044 UR ALBUMIN SEMIQUANTITATIVE: CPT | Performed by: INTERNAL MEDICINE

## 2020-09-08 PROCEDURE — 83036 HEMOGLOBIN GLYCOSYLATED A1C: CPT | Performed by: INTERNAL MEDICINE

## 2020-09-08 PROCEDURE — G0439 PPPS, SUBSEQ VISIT: HCPCS | Performed by: INTERNAL MEDICINE

## 2020-09-08 PROCEDURE — 90732 PPSV23 VACC 2 YRS+ SUBQ/IM: CPT | Performed by: INTERNAL MEDICINE

## 2020-09-08 PROCEDURE — 3044F HG A1C LEVEL LT 7.0%: CPT | Performed by: INTERNAL MEDICINE

## 2020-09-08 PROCEDURE — G8536 NO DOC ELDER MAL SCRN: HCPCS | Performed by: INTERNAL MEDICINE

## 2020-09-08 PROCEDURE — G8399 PT W/DXA RESULTS DOCUMENT: HCPCS | Performed by: INTERNAL MEDICINE

## 2020-09-08 PROCEDURE — 90653 IIV ADJUVANT VACCINE IM: CPT | Performed by: INTERNAL MEDICINE

## 2020-09-08 PROCEDURE — 2022F DILAT RTA XM EVC RTNOPTHY: CPT | Performed by: INTERNAL MEDICINE

## 2020-09-08 PROCEDURE — 1101F PT FALLS ASSESS-DOCD LE1/YR: CPT | Performed by: INTERNAL MEDICINE

## 2020-09-08 PROCEDURE — 99214 OFFICE O/P EST MOD 30 MIN: CPT | Performed by: INTERNAL MEDICINE

## 2020-09-08 PROCEDURE — G8417 CALC BMI ABV UP PARAM F/U: HCPCS | Performed by: INTERNAL MEDICINE

## 2020-09-08 PROCEDURE — 3017F COLORECTAL CA SCREEN DOC REV: CPT | Performed by: INTERNAL MEDICINE

## 2020-09-08 PROCEDURE — G8754 DIAS BP LESS 90: HCPCS | Performed by: INTERNAL MEDICINE

## 2020-09-08 PROCEDURE — G8510 SCR DEP NEG, NO PLAN REQD: HCPCS | Performed by: INTERNAL MEDICINE

## 2020-09-08 PROCEDURE — G8427 DOCREV CUR MEDS BY ELIG CLIN: HCPCS | Performed by: INTERNAL MEDICINE

## 2020-09-08 PROCEDURE — 1090F PRES/ABSN URINE INCON ASSESS: CPT | Performed by: INTERNAL MEDICINE

## 2020-09-08 RX ORDER — LOSARTAN POTASSIUM AND HYDROCHLOROTHIAZIDE 25; 100 MG/1; MG/1
1 TABLET ORAL DAILY
Qty: 90 TAB | Refills: 0 | Status: SHIPPED | OUTPATIENT
Start: 2020-09-08 | End: 2020-12-06

## 2020-09-08 NOTE — PROGRESS NOTES
Carole Jordan is a 71 y.o. female and presents for Annual Medicare Wellness Visit. Assessment of cognitive impairment: Alert and oriented x 3     Depression Screen:   3 most recent PHQ Screens 9/8/2020   Little interest or pleasure in doing things Not at all   Feeling down, depressed, irritable, or hopeless Not at all   Total Score PHQ 2 0       Fall Risk Assessment:    Fall Risk Assessment, last 12 mths 9/8/2020   Able to walk? Yes   Fall in past 12 months? No   Fall with injury? -   Number of falls in past 12 months -   Fall Risk Score -       Abuse Screen:   Abuse Screening Questionnaire 9/20/2018   Do you ever feel afraid of your partner? N   Are you in a relationship with someone who physically or mentally threatens you? N   Is it safe for you to go home? Y       Activities of Daily Living:  self  Requires assistance with: no assistance needed  Patient handle his/her own medications  yes Use of pill box  yes  Activities of Daily Living:   ADL Assessment 9/8/2020   Feeding yourself No Help Needed   Getting from bed to chair No Help Needed   Getting dressed No Help Needed   Bathing or showering No Help Needed   Walk across the room (includes cane/walker) No Help Needed   Using the telphone No Help Needed   Taking your medications No Help Needed   Preparing meals No Help Needed   Managing money (expenses/bills) No Help Needed   Moderately strenuous housework (laundry) No Help Needed   Shopping for personal items (toiletries/medicines) No Help Needed   Shopping for groceries No Help Needed   Driving No Help Needed   Climbing a flight of stairs No Help Needed   Getting to places beyond walking distances No Help Needed       Health Maintenance:  Daily Aspirin: no  Bone Density: completed at Ob/Gyn office 2 years ago.   Glaucoma Screening: had to cancelled due to COVID,will reschedule   Immunizations:    Tetanus: next due 8/3/20/2026 nfluenza: due  Shingles: due but cannot afford  PPSV-23:second one due Prevnar-13: completed    Cancer screening:    Cervical: had one two years ago and is not screening  Breast: scheduled for next month and will have results sent to us Colon: declines colonoscopy     Alcohol Risk Screen:   On any occasion during the past 3 months, have you had more than 3 drinks(female) or 4 drinks (male) containing alcohol in one?  no  Do you average more than 7 drinks (female) or 14 drinks (male) per week?  none  Type and amount:none    Hearing Loss:  No hearing problems at this time    Vision Loss:   Wears glasses, all the time    Adult Nutrition Screen:  No risk noted    Advance Care Planning:   End of Life Planning: does not think she does but did a thing with [facility] society  Wolf Lainez [address] ACP-Facilitator appointment yes       Medications/Allergies: Reviewed with patient  Prior to Admission medications    Medication Sig Start Date End Date Taking? Authorizing Provider   hydroCHLOROthiazide (HYDRODIURIL) 25 mg tablet TAKE 1 TABLET BY MOUTH ONE TIME DAILY 8/14/20  Yes Jojo Martinez MD   red yeast rice extract 600 mg cap Take 600 mg by mouth now. Yes Provider, Historical   Cholecalciferol, Vitamin D3, (VITAMIN D) 1,000 unit Cap Take  by mouth. Yes Provider, Historical   VIT C/VITAMIN E ACETATE/CRANB (CRANBERRY CONC-ASCORBIC ACID PO) Take  by mouth. Yes Provider, Historical   multivitamin, stress formula (STRESS TAB) tablet Take 1 Tab by mouth daily. Yes Provider, Historical   FLAXSEED OIL PO Take  by mouth. Yes Provider, Historical   CALCIUM PO Take 500 mg by mouth two (2) times a day. 6/21/11  Yes Provider, Historical   DOCOSAHEXANOIC ACID/EPA (FISH OIL PO) Take 1,000 mg by mouth daily. 2 tabs qd 6/21/11  Yes Provider, Historical   metFORMIN (GLUCOPHAGE) 500 mg tablet Take 1 Tab by mouth two (2) times daily (with meals) for 90 days.  6/9/20 9/7/20  Jojo Martinez MD       Allergies   Allergen Reactions    Codeine Nausea Only     GI    Macrodantin [Nitrofurantoin Macrocrystalline] Itching    Tenoretic 100 [Atenolol-Chlorthalidone] Other (comments)     fatigue    Zocor [Simvastatin] Swelling       Objective:  Visit Vitals  BP (!) 143/92 (BP 1 Location: Left arm, BP Patient Position: Sitting)   Pulse 71   Temp 97.1 °F (36.2 °C) (Temporal)   Resp 15   Ht 5' 4\" (1.626 m)   Wt 209 lb 9.6 oz (95.1 kg)   SpO2 97%   BMI 35.98 kg/m²    Body mass index is 35.98 kg/m². Problem List: Reviewed with patient and discussed risk factors.     Patient Active Problem List   Diagnosis Code    Essential hypertension, benign I10    Mixed hyperlipidemia E78.2    Esophageal reflux K21.9    Sleep apnea, obstructive G47.33       PSH: Reviewed with patient  Past Surgical History:   Procedure Laterality Date    BREAST SURGERY PROCEDURE UNLISTED      breast biopsy    HX GYN      HX TUBAL LIGATION          SH: Reviewed with patient  Social History     Tobacco Use    Smoking status: Never Smoker    Smokeless tobacco: Never Used   Substance Use Topics    Alcohol use: No    Drug use: No       FH: Reviewed with patient  Family History   Problem Relation Age of Onset    Cancer Mother         breast    Cancer Brother         leukemia    Cancer Sister        Current medical providers:    Patient Care Team:  Quique Branch MD as PCP - General (Internal Medicine)  Quique Branch MD as PCP - [facility] Empaneled Provider  Juan Rizzo MD as Physician (Sleep Medicine)    Plan:      Diagnoses and all orders for this visit:    Medicare annual wellness visit, subsequent  Immunization and Health screening discussed with her     ACP (advance care planning)  -     REFERRAL TO ACP CLINICAL SPECIALIST    Need for pneumococcal vaccination     PNEUMOCOCCAL POLYSACCHARIDE VACCINE, 23-VALENT, ADULT OR IMMUNOSUPPRESSED PT DOSE,    Encounter for immunization  -  -     INFLUENZA VIRUS VACCINE, HIGH DOSE SEASONAL, [address] Maintenance   Topic Date Due    Eye Exam Retinal or Dilated  11[birth year]/1960  Shingrix Vaccine Age 50> (1 of 2) 11/08/2000    GLAUCOMA SCREENING Q2Y  11/08/2015    Pneumococcal 65+ years (2 of 2 - PPSV23) 09/20/2018    Breast Cancer Screen Mammogram  08/08/2019    MICROALBUMIN Q1  09/20/2019    Foot Exam Q1  09/21/2019    Flu Vaccine (1) 09/01/2020    A1C test (Diabetic or Prediabetic)  09/02/2021    Lipid Screen  09/02/2021    Medicare Yearly Exam  09/09/2021    DTaP/Tdap/Td series (2 - Td) 08/03/2026    Colonoscopy  09/13/2027    Hepatitis C Screening  Completed    Bone Densitometry (Dexa) Screening  Completed          Urinary/ Fecal Incontinence: no    Regular physical exercise: yes walks at least three miles every day weather permitting    Patient verbalized understanding of information presented. AVS and Medicare Part B Preventive Services Table printed and given to pt and reviewed. See table for findings under Recommendation and Scheduled. All of the patient's questions were answered. ritten by Laure Ctoton, as dictated by Dr. Kayy Okeefe MD.    Leatha Portillo is a 71 y.o. female. HPI  Pt presents today for routine care follow-up. She has lost weight from 228 lb on 1/30/20 to 209 lb today. Continues on metformin 500 mg every day. She does not check her BS regularly at home, and her HbA1c was 4.6 on 9/2/20. However, this will be re-checked in office today as her HbA1c was 8.6 on 6/6/20. Her labs done on 9/2/20 showed a fasting BS of 126. She     She is taking Hyzaar every day, and she took it this morning. Pt's BP is elevated today in office at 143/92, 138/82 on manual repeat in L arm while sitting down. She endorses occasional tension HA and notes that she was a bit nervous when she had her BP taken. Although she does not usually check her BP at home, she has a monitor and is willing to start.  She had been taking red yeast rice extract for her cholesterol, but her walking and weight loss are likely making the biggest difference. She uses her CPAP machine every night, and she has been feeling more energized in the mornings. Her mammogram is scheduled at [facility] for next month. She is due for her third pneumonia vaccine, and she would like to get her flu vaccine here in office today. She is UTD on Tdap vaccines, and she has researched the Shingrix vaccine and found that it is not covered by her insurance. Patient Active Problem List   Diagnosis Code    Essential hypertension, benign I10    Mixed hyperlipidemia E78.2    Esophageal reflux K21.9    Sleep apnea, obstructive G47.33        Current Outpatient Medications on File Prior to Visit   Medication Sig Dispense Refill    red yeast rice extract 600 mg cap Take 600 mg by mouth now.  Cholecalciferol, Vitamin D3, (VITAMIN D) 1,000 unit Cap Take  by mouth.  VIT C/VITAMIN E ACETATE/CRANB (CRANBERRY CONC-ASCORBIC ACID PO) Take  by mouth.  multivitamin, stress formula (STRESS TAB) tablet Take 1 Tab by mouth daily.  FLAXSEED OIL PO Take  by mouth.  CALCIUM PO Take 500 mg by mouth two (2) times a day.  DOCOSAHEXANOIC ACID/EPA (FISH OIL PO) Take 1,000 mg by mouth daily. 2 tabs qd      [DISCONTINUED] hydroCHLOROthiazide (HYDRODIURIL) 25 mg tablet TAKE 1 TABLET BY MOUTH ONE TIME DAILY 90 Tab 0    [] metFORMIN (GLUCOPHAGE) 500 mg tablet Take 1 Tab by mouth two (2) times daily (with meals) for 90 days. 180 Tab 0     No current facility-administered medications on file prior to visit.         Allergies   Allergen Reactions    Codeine Nausea Only     GI    Macrodantin [Nitrofurantoin Macrocrystalline] Itching    Tenoretic 100 [Atenolol-Chlorthalidone] Other (comments)     fatigue    Zocor [Simvastatin] Swelling       Past Medical History:   Diagnosis Date    Esophageal reflux 2010    Essential hypertension, benign 2010    Mixed hyperlipidemia 2010    Obesity, unspecified 2010    CINTHIA (obstructive sleep apnea) 08-    AHI: 5.6 per hour       Past Surgical History:   Procedure Laterality Date    BREAST SURGERY PROCEDURE UNLISTED      breast biopsy    HX GYN      HX TUBAL LIGATION         Family History   Problem Relation Age of Onset    Cancer Mother         breast    Cancer Brother         leukemia    Cancer Sister        Social History     Socioeconomic History    Marital status:      Spouse name: Not on file    Number of children: Not on file    Years of education: Not on file    Highest education level: Not on file   Occupational History    Not on file   Social Needs    Financial resource strain: Not on file    Food insecurity     Worry: Not on file     Inability: Not on file    Transportation needs     Medical: Not on file     Non-medical: Not on file   Tobacco Use    Smoking status: Never Smoker    Smokeless tobacco: Never Used   Substance and Sexual Activity    Alcohol use: No    Drug use: No    Sexual activity: Not Currently   Lifestyle    Physical activity     Days per week: Not on file     Minutes per session: Not on file    Stress: Not on file   Relationships    Social connections     Talks on phone: Not on file     Gets together: Not on file     Attends Confucianism service: Not on file     Active member of club or organization: Not on file     Attends meetings of clubs or organizations: Not on file     Relationship status: Not on file    Intimate partner violence     Fear of current or ex partner: Not on file     Emotionally abused: Not on file     Physically abused: Not on file     Forced sexual activity: Not on file   Other Topics Concern     Service No    Blood Transfusions No    Caffeine Concern No     Comment: 2 cups of coffee daily    Occupational Exposure No    Hobby Hazards No    Sleep Concern No    Stress Concern No    Weight Concern Yes    Special Diet No    Back Care Yes    Exercise No    Bike Helmet No    Seat Belt Yes    Self-Exams No Social History Narrative    Not on file       Orders Only on 09/02/2020   Component Date Value Ref Range Status    Hemoglobin A1c 09/02/2020 4.6  4.0 - 5.6 % Final    Comment: NEW METHOD PLEASE NOTE NEW REFERENCE RANGE  (NOTE)  HbA1C Interpretive Ranges  <5.7              Normal  5.7 - 6.4         Consider Prediabetes  >6.5              Consider Diabetes      Est. average glucose 09/02/2020 85  mg/dL Final   Orders Only on 09/02/2020   Component Date Value Ref Range Status    LIPID PROFILE 09/02/2020        Final    Cholesterol, total 09/02/2020 170  <200 MG/DL Final    Triglyceride 09/02/2020 145  <150 MG/DL Final    Comment: Based on NCEP-ATP III:  Triglycerides <150 mg/dL  is considered normal, 150-199  mg/dL  borderline high,  200-499 mg/dL high and  greater than or equal to 500  mg/dL very high.  HDL Cholesterol 09/02/2020 56  MG/DL Final    Comment: Based on NCEP ATP III, HDL Cholesterol <40 mg/dL is considered low and >60  mg/dL is elevated.  LDL, calculated 09/02/2020 85  0 - 100 MG/DL Final    Comment: Based on the NCEP-ATP: LDL-C concentrations are considered  optimal <100 mg/dL,  near optimal/above Normal 100-129 mg/dL Borderline High: 130-159, High: 160-189  mg/dL Very High: Greater than or equal to 190 mg/dL      VLDL, calculated 09/02/2020 29  MG/DL Final    CHOL/HDL Ratio 09/02/2020 3.0  0.0 - 5.0   Final   Orders Only on 09/02/2020   Component Date Value Ref Range Status    TSH 09/02/2020 1.73  0.36 - 3.74 uIU/mL Final    Comment:   Due to TSH heterogeneity, both structurally and degree of glycosylation,  monoclonal antibodies used in the TSH assay may not accurately quantitate TSH. Therefore, this result should be correlated with clinical findings as well as  with other assessments of thyroid function, e.g., free T4, free T3.       Sodium 09/02/2020 140  136 - 145 mmol/L Final    Potassium 09/02/2020 4.1  3.5 - 5.1 mmol/L Final    Chloride 09/02/2020 105  97 - 108 mmol/L Final    CO2 09/02/2020 29  21 - 32 mmol/L Final    Anion gap 09/02/2020 6  5 - 15 mmol/L Final    Glucose 09/02/2020 126* 65 - 100 mg/dL Final    BUN 09/02/2020 14  6 - 20 MG/DL Final    Creatinine 09/02/2020 0.78  0.55 - 1.02 MG/DL Final    BUN/Creatinine ratio 09/02/2020 18  12 - 20   Final    GFR est AA 09/02/2020 >60  >60 ml/min/1.73m2 Final    GFR est non-AA 09/02/2020 >60  >60 ml/min/1.73m2 Final    Comment: Estimated GFR is calculated using the IDMS-traceable Modification of Diet in  Renal Disease (MDRD) Study equation, reported for both  Americans  (GFRAA) and non- Americans (GFRNA), and normalized to 1.73m2 body  surface area. The physician must decide which value applies to the patient.  Calcium 09/02/2020 9.2  8.5 - 10.1 MG/DL Final    Bilirubin, total 09/02/2020 0.7  0.2 - 1.0 MG/DL Final    ALT (SGPT) 09/02/2020 24  12 - 78 U/L Final    AST (SGOT) 09/02/2020 13* 15 - 37 U/L Final    Alk.  phosphatase 09/02/2020 80  45 - 117 U/L Final    Protein, total 09/02/2020 6.8  6.4 - 8.2 g/dL Final    Albumin 09/02/2020 4.0  3.5 - 5.0 g/dL Final    Globulin 09/02/2020 2.8  2.0 - 4.0 g/dL Final    A-G Ratio 09/02/2020 1.4  1.1 - 2.2   Final    WBC 09/02/2020 5.6  3.6 - 11.0 K/uL Final    RBC 09/02/2020 4.18  3.80 - 5.20 M/uL Final    HGB 09/02/2020 13.1  11.5 - 16.0 g/dL Final    HCT 09/02/2020 40.6  35.0 - 47.0 % Final    MCV 09/02/2020 97.1  80.0 - 99.0 FL Final    MCH 09/02/2020 31.3  26.0 - 34.0 PG Final    MCHC 09/02/2020 32.3  30.0 - 36.5 g/dL Final    RDW 09/02/2020 12.6  11.5 - 14.5 % Final    PLATELET 61/65/5827 320  150 - 400 K/uL Final    MPV 09/02/2020 10.7  8.9 - 12.9 FL Final    NRBC 09/02/2020 0.0  0  WBC Final    ABSOLUTE NRBC 09/02/2020 0.00  0.00 - 0.01 K/uL Final    NEUTROPHILS 09/02/2020 54  32 - 75 % Final    LYMPHOCYTES 09/02/2020 35  12 - 49 % Final    MONOCYTES 09/02/2020 6  5 - 13 % Final    EOSINOPHILS 09/02/2020 4  0 - 7 % Final  BASOPHILS 09/02/2020 1  0 - 1 % Final    IMMATURE GRANULOCYTES 09/02/2020 0  0.0 - 0.5 % Final    ABS. NEUTROPHILS 09/02/2020 3.0  1.8 - 8.0 K/UL Final    ABS. LYMPHOCYTES 09/02/2020 2.0  0.8 - 3.5 K/UL Final    ABS. MONOCYTES 09/02/2020 0.4  0.0 - 1.0 K/UL Final    ABS. EOSINOPHILS 09/02/2020 0.2  0.0 - 0.4 K/UL Final    ABS. BASOPHILS 09/02/2020 0.1  0.0 - 0.1 K/UL Final    ABS. IMM. GRANS. 09/02/2020 0.0  0.00 - 0.04 K/UL Final    DF 09/02/2020 AUTOMATED    Final     Review of Systems   Constitutional: Negative for malaise/fatigue and weight loss. HENT: Negative for congestion and sore throat. Eyes: Negative for blurred vision. Respiratory: Negative for cough and shortness of breath. Cardiovascular: Negative for chest pain and leg swelling. Gastrointestinal: Negative for constipation and heartburn. Genitourinary: Negative for frequency and urgency. Musculoskeletal: Negative for back pain, joint pain and myalgias. Neurological: Positive for headaches (occasional). Negative for dizziness. Psychiatric/Behavioral: Negative for depression. The patient is not nervous/anxious and does not have insomnia. Visit Vitals  /82 (BP 1 Location: Left arm, BP Patient Position: Sitting)   Pulse 71   Temp 97.1 °F (36.2 °C) (Temporal)   Resp 15   Ht 5' 4\" (1.626 m)   Wt 209 lb 9.6 oz (95.1 kg)   SpO2 97%   BMI 35.98 kg/m²     Physical Exam  Vitals signs and nursing note reviewed. Constitutional:       General: She is not in acute distress. Appearance: Normal appearance. She is well-developed and well-groomed. She is obese. She is not diaphoretic. HENT:      Right Ear: External ear normal.      Left Ear: External ear normal.   Eyes:      General: No scleral icterus. Right eye: No discharge. Left eye: No discharge. Extraocular Movements: Extraocular movements intact. Neck:      Musculoskeletal: Normal range of motion and neck supple.    Cardiovascular:      Rate and Rhythm: Normal rate and regular rhythm. Pulmonary:      Effort: Pulmonary effort is normal.      Breath sounds: Normal breath sounds. No wheezing. Musculoskeletal:      Right lower leg: No edema. Left lower leg: No edema. Feet:      Comments: Diabetic foot exam:   Left: Vibratory sensation normal    Sharp/dull discrimination normal    Filament test normal sensation with micro filament   Pulse DP: 2+ (normal)   Deformities: None  Right: Vibratory sensation normal   Sharp/dull discrimination normal   Filament test normal sensation with micro filament   Pulse DP: 2+ (normal)   Deformities: None  Lymphadenopathy:      Cervical: No cervical adenopathy. Neurological:      Mental Status: She is alert and oriented to person, place, and time. Psychiatric:         Mood and Affect: Mood and affect normal.         Behavior: Behavior normal.       ASSESSMENT and PLAN    ICD-10-CM ICD-9-CM    1. Controlled type 2 diabetes mellitus without complication, without long-term current use of insulin (HCC)  E11.9 250.00 AMB POC URINE, MICROALBUMIN, SEMIQUANT (3 RESULTS)    Urine sample obtained in office. Pt continues on metformin 500 mg BID.  DIABETES FOOT EXAM    Diabetic foot exam normal.         AMB POC HEMOGLOBIN A1C    HbA1c checked in office today to verify her lab results from 9/2/20. losartan-hydroCHLOROthiazide (HYZAAR) 100-25 mg per tablet sent to pharmacy. I refilled her Hyzaar. 2. Essential hypertension, benign  I10 401.1 Her BP was slightly elevated in office today, likely due to stress. Instructed patient to begin checking her BP regularly at home. 3. Sleep apnea, obstructive  G47.33 327.23 Compliant on CPAP, she has been sleeping well and feeling refreshed in the morning. 4. Obesity (BMI 30-39. 9)  E66.9 278.00 She is doing very well and has lost significant weight so far. I commended the pt on their healthy lifestyle choices and routines.  Explained that they should be walking 5,000 steps daily to maintain conditioning and weight and increase to at least 10,000 steps to work on losing weight. Additional comments: I informed her that red yeast rice extract no longer helps with cholesterol. However, I encouraged her to continue with her exercise and weight loss efforts. This plan was reviewed with the patient and patient agrees. All questions were answered. This scribe documentation was reviewed by me and accurately reflects the examination and decisions made by me. This note will not be viewable in [address]

## 2020-09-09 DIAGNOSIS — E11.9 CONTROLLED TYPE 2 DIABETES MELLITUS WITHOUT COMPLICATION, WITHOUT LONG-TERM CURRENT USE OF INSULIN (HCC): ICD-10-CM

## 2020-09-09 RX ORDER — METFORMIN HYDROCHLORIDE 500 MG/1
500 TABLET ORAL 2 TIMES DAILY WITH MEALS
Qty: 180 TAB | Refills: 0 | Status: SHIPPED | OUTPATIENT
Start: 2020-09-09 | End: 2020-12-06

## 2020-09-18 ENCOUNTER — TELEPHONE (OUTPATIENT)
Dept: CASE MANAGEMENT | Age: 70
End: 2020-09-18

## 2020-09-18 NOTE — TELEPHONE ENCOUNTER
RN called patient to review Advance Care Planning and to schedule an in depth conversation with completion of an Advance Medical Directive. Pt stated she would like to receive the ACP information but is not interested in scheduling a follow up call for an in depth conversation at this time. RN will email the following: ACP Information sheet, information regarding choosing a health care agent, Feeding tube information, Ventilator information, CPR information, Sample AMD.  RN will provide contact information if patient decides to proceed with ACP.   Harvey Palacios RN

## 2020-12-06 DIAGNOSIS — E11.9 CONTROLLED TYPE 2 DIABETES MELLITUS WITHOUT COMPLICATION, WITHOUT LONG-TERM CURRENT USE OF INSULIN (HCC): ICD-10-CM

## 2020-12-06 RX ORDER — LOSARTAN POTASSIUM AND HYDROCHLOROTHIAZIDE 25; 100 MG/1; MG/1
TABLET ORAL
Qty: 90 TAB | Refills: 0 | Status: SHIPPED | OUTPATIENT
Start: 2020-12-06 | End: 2021-03-10 | Stop reason: SDUPTHER

## 2020-12-06 RX ORDER — METFORMIN HYDROCHLORIDE 500 MG/1
TABLET ORAL
Qty: 180 TAB | Refills: 0 | Status: SHIPPED | OUTPATIENT
Start: 2020-12-06 | End: 2021-03-10 | Stop reason: SDUPTHER

## 2021-02-01 ENCOUNTER — OFFICE VISIT (OUTPATIENT)
Dept: SLEEP MEDICINE | Age: 71
End: 2021-02-01

## 2021-02-01 DIAGNOSIS — G47.33 OSA (OBSTRUCTIVE SLEEP APNEA): Primary | ICD-10-CM

## 2021-02-02 ENCOUNTER — DOCUMENTATION ONLY (OUTPATIENT)
Dept: SLEEP MEDICINE | Age: 71
End: 2021-02-02

## 2021-02-02 ENCOUNTER — VIRTUAL VISIT (OUTPATIENT)
Dept: SLEEP MEDICINE | Age: 71
End: 2021-02-02
Payer: MEDICARE

## 2021-02-02 DIAGNOSIS — G47.33 OSA (OBSTRUCTIVE SLEEP APNEA): Primary | ICD-10-CM

## 2021-02-02 PROCEDURE — G8432 DEP SCR NOT DOC, RNG: HCPCS | Performed by: INTERNAL MEDICINE

## 2021-02-02 PROCEDURE — 99213 OFFICE O/P EST LOW 20 MIN: CPT | Performed by: INTERNAL MEDICINE

## 2021-02-02 PROCEDURE — 1090F PRES/ABSN URINE INCON ASSESS: CPT | Performed by: INTERNAL MEDICINE

## 2021-02-02 PROCEDURE — 1101F PT FALLS ASSESS-DOCD LE1/YR: CPT | Performed by: INTERNAL MEDICINE

## 2021-02-02 PROCEDURE — G8399 PT W/DXA RESULTS DOCUMENT: HCPCS | Performed by: INTERNAL MEDICINE

## 2021-02-02 PROCEDURE — G8536 NO DOC ELDER MAL SCRN: HCPCS | Performed by: INTERNAL MEDICINE

## 2021-02-02 PROCEDURE — G8417 CALC BMI ABV UP PARAM F/U: HCPCS | Performed by: INTERNAL MEDICINE

## 2021-02-02 PROCEDURE — G8756 NO BP MEASURE DOC: HCPCS | Performed by: INTERNAL MEDICINE

## 2021-02-02 PROCEDURE — G8428 CUR MEDS NOT DOCUMENT: HCPCS | Performed by: INTERNAL MEDICINE

## 2021-02-02 PROCEDURE — 3017F COLORECTAL CA SCREEN DOC REV: CPT | Performed by: INTERNAL MEDICINE

## 2021-02-02 NOTE — PATIENT INSTRUCTIONS
217 [facility] [place]. 1668 [facility], [address] Tel.  [phone] Fax. 100 [facility] [address] [place], [address] [facility] Tel.  [phone] Fax. [phone] [address] Tel.  [phone] Fax. [phone] Learning About CPAP for Sleep Apnea What is CPAP? CPAP is a small machine that you use at home every night while you sleep. It increases air pressure in your throat to keep your airway open. When you have sleep apnea, this can help you sleep better so you feel much better. CPAP stands for \"continuous positive airway pressure. \" The CPAP machine will have one of the following: · A mask that covers your nose and mouth · Prongs that fit into your nose · A mask that covers your nose only, the most common type. This type is called NCPAP. The N stands for \"nasal.\" Why is it done? CPAP is usually the best treatment for obstructive sleep apnea. It is the first treatment choice and the most widely used. Your doctor may suggest CPAP if you have: · Moderate to severe sleep apnea. · Sleep apnea and coronary artery disease (CAD) or heart failure. How does it help? · CPAP can help you have more normal sleep, so you feel less sleepy and more alert during the daytime. · CPAP may help keep heart failure or other heart problems from getting worse. · NCPAP may help lower your blood pressure. · If you use CPAP, your bed partner may also sleep better because you are not snoring or restless. What are the side effects? Some people who use CPAP have: · A dry or stuffy nose and a sore throat. · Irritated skin on the face. · Sore eyes. · Bloating. If you have any of these problems, work with your doctor to fix them. Here are some things you can try: · Be sure the mask or nasal prongs fit well. · See if your doctor can adjust the pressure of your CPAP. · If your nose is dry, try a humidifier. · If your nose is runny or stuffy, try decongestant medicine or a steroid nasal spray. If these things do not help, you might try a different type of machine. Some machines have air pressure that adjusts on its own. Others have air pressures that are different when you breathe in than when you breathe out. This may reduce discomfort caused by too much pressure in your nose. Where can you learn more? Go to BeHome247.be Enter H334 in the search box to learn more about \"Learning About CPAP for Sleep Apnea. \"  
© 6195-6505 Healthwise, Incorporated. Care instructions adapted under license by [employer] (which disclaims liability or warranty for this information). This care instruction is for use with your licensed healthcare professional. If you have questions about a medical condition or this instruction, always ask your healthcare professional. [address] any warranty or liability for your use of this information. Content Version: 0.3.95530; Last Revised: January 11, 2010 PROPER SLEEP HYGIENE What to avoid · Do not have drinks with caffeine, such as coffee or black tea, for 8 hours before bed. · Do not smoke or use other types of tobacco near bedtime. Nicotine is a stimulant and can keep you awake. · Avoid drinking alcohol late in the evening, because it can cause you to wake in the middle of the night. · Do not eat a big meal close to bedtime. If you are hungry, eat a light snack. · Do not drink a lot of water close to bedtime, because the need to urinate may wake you up during the night. · Do not read or watch TV in bed. Use the bed only for sleeping and sexual activity. What to try · Go to bed at the same time every night, and wake up at the same time every morning. Do not take naps during the day. · Keep your bedroom quiet, dark, and cool. · Get regular exercise, but not within 3 to 4 hours of your bedtime. Jes Uribe · Sleep on a comfortable pillow and mattress. · If watching the clock makes you anxious, turn it facing away from you so you cannot see the time. · If you worry when you lie down, start a worry book. Well before bedtime, write down your worries, and then set the book and your concerns aside. · Try meditation or other relaxation techniques before you go to bed. · If you cannot fall asleep, get up and go to another room until you feel sleepy. Do something relaxing. Repeat your bedtime routine before you go to bed again. · Make your house quiet and calm about an hour before bedtime. Turn down the lights, turn off the TV, log off the computer, and turn down the volume on music. This can help you relax after a busy day. Drowsy Driving: The [facility] 54 cites drowsiness as a causing factor in more than 008,598 police reported crashes annually, resulting in 76,000 injuries and 1,500 deaths. Other surveys suggest 55% of people polled have driven while drowsy in the past year, 23% had fallen asleep but not crashed, 3% crashed, and 2% had and accident due to drowsy driving. Who is at risk? Young Drivers: One study of drowsy driving accidents states that 55% of the drivers were under 25 years. Of those, 75% were male. Shift Workers and Travelers: People who work overnight or travel across time zones frequently are at higher risk of experiencing Circadian Rhythm Disorders. They are trying to work and function when their body is programed to sleep. Sleep Deprived: Lack of sleep has a serious impact on your ability to pay attention or focus on a task. Consistently getting less than the average of 8 hours your body needs creates partial or cumulative sleep deprivation. Untreated Sleep Disorders: Sleep Apnea, Narcolepsy, R.L.S., and other sleep disorders (untreated) prevent a person from getting enough restful sleep. This leads to excessive daytime sleepiness and increases the risk for drowsy driving accidents by up to 7 times. Medications / Alcohol: Even over the counter medications can cause drowsiness. Medications that impair a drivers attention should have a warning label. Alcohol naturally makes you sleepy and on its own can cause accidents. Combined with excessive drowsiness its effects are amplified. Signs of Drowsy Driving: * You don't remember driving the last few miles * You may drift out of your kylah * You are unable to focus and your thoughts wander * You may yawn more often than normal 
 * You have difficulty keeping your eyes open / nodding off * Missing traffic signs, speeding, or tailgating Prevention-  
Good sleep hygiene, lifestyle and behavioral choices have the most impact on drowsy driving. There is no substitute for sleep and the average person requires 8 hours nightly. If you find yourself driving drowsy, stop and sleep. Consider the sleep hygiene tips provided during your visit as well. Medication Refill Policy: Refills for all medications require 1 week advance notice. Please have your pharmacy fax a refill request. We are unable to fax, or call in \"controled substance\" medications and you will need to pick these prescriptions up from our office. SensorLogic Activation Thank you for requesting access to SensorLogic. Please follow the instructions below to securely access and download your online medical record. SensorLogic allows you to send messages to your doctor, view your test results, renew your prescriptions, schedule appointments, and more. How Do I Sign Up? 1. In your internet browser, go to [email or web address]. 2. Click on the First Time User? Click Here link in the Sign In box. You will see the New Member Sign Up page. 3. Enter your HealthHiway Access Code exactly as it appears below. You will not need to use this code after youve completed the sign-up process. If you do not sign up before the expiration date, you must request a new code. MyChart Access Code: Activation code not generated Current HealthHiway Status: Active (This is the date your MyChart access code will ) 4. Enter the last four digits of your Social Security Number (xxxx) and Date of Birth (mm/dd/yyyy) as indicated and click Submit. You will be taken to the next sign-up page. 5. Create a VasoNovat ID. This will be your HealthHiway login ID and cannot be changed, so think of one that is secure and easy to remember. 6. Create a HealthHiway password. You can change your password at any time. 7. Enter your Password Reset Question and Answer. This can be used at a later time if you forget your password. 8. Enter your e-mail address. You will receive e-mail notification when new information is available in [address] 9. Click Sign Up. You can now view and download portions of your medical record. 10. Click the Download Summary menu link to download a portable copy of your medical information. Additional Information If you have questions, please call [phone]. Remember, HealthHiway is NOT to be used for urgent needs. For medical emergencies, dial 911.

## 2021-02-02 NOTE — PROGRESS NOTES
217 [facility]., [place]. [place], [address]  Tel.  [phone]  Fax. 100 [facility] 60  [place], 200 [address] [facility]  Tel.  [phone]  Fax. [phone] [address] [place] [address]  Tel.  [phone]  Fax. [phone] Mervin Hines (: 1950) is a 79 y.o. female, established patient, seen for positive airway pressure follow-up and evaluation of the following chief complaint(s):   Sleep Problem (Yearly F/U, send link to: [phone])       Roberto Carlos Dave was last seen by me on 2020, prior notes reviewed in detail. Polysomnogram (PSG) performed on  showed an AHI of 5.6/hr.     ASSESSMENT/PLAN:    ICD-10-CM ICD-9-CM    1. CINTHIA (obstructive sleep apnea)  G47.33 327.23 AMB SUPPLY ORDER   2. BMI 35.0-35.9,adult  Z68.35 V85.35        On Respironics :  DreamStation Auto-CPAP. Set up date  2021. Settings:  Min EPAP: 4 cmH2O  Max EPAP: 20 cmH2O      She is adherent with PAP therapy and PAP continues to benefit patient and remains necessary for control of her sleep apnea. 1. Sleep Apnea -   * Continue on current pressures. * Home nocturnal oximetry ordered to ensure adequate oxygenation on PAP Therapy. Patient aware of the need to return for a possible Bi-Level trial if hypoxemia is present on current therapy which is adequately controlling patient's AHI. * Supplies for his device were ordered as noted below:    Orders Placed This Encounter    AMB SUPPLY ORDER     Diagnosis: (G47.33) CINTHIA (obstructive sleep apnea)  (primary encounter diagnosis)     Replacement Supplies for Positive Airway Pressure Therapy Device:   Duration of need: 99 months.  Nasal Pillows Combo Mask (Replace) 2 per month.  Nasal Pillows (Replace) 2 per month.  Headgear 1 every 6 months.  Tubing 1 every 3 months.  Filter(s) Disposable 2 per month.  Filter(s) Non-Disposable 1 every 6 months.    .   [address] Chamber for Humidifier (Replace) 1 every 6 months. Renetta Randall MD, [facility]; NPI: [id number]    Electronically signed. Date:- 02/02/21       * She is familiar with the telephone monitoring application, is willing to track therapy and agrees to notify use if AHI is >5 per hour. * Counseling was provided regarding the importance of regular PAP use with emphasis on ensuring sufficient total sleep time, proper sleep hygiene, and safe driving. * Re-enforced proper and regular cleaning for the device. * She was asked to contact our office for any problems regarding PAP therapy. 2. Recommended a dedicated weight loss program through appropriate diet and exercise regimen as significant weight reduction has been shown to reduce severity of obstructive sleep apnea. SUBJECTIVE/OBJECTIVE:    She  is seen today for follow up on PAP device and reports no problems using the device. The following concerns identified:    Drowsiness no Problems exhaling no   Snoring no Forget to put on no   Mask Comfortable yes Can't fall asleep no   Dry Mouth no Mask falls off no   Air Leaking no Frequent awakenings no       She admits that her sleep has improved on PAP therapy using nasal pillows mask and non-heated tubing. Review of device download indicated:    Percent of Days with Usage >= 4 hours 100 %  Average Usage (Days Used) 7 hour 28 minutes    Average Device Pressure < =90% of Time  10.6 cmH2O    Average Time in Large Leak Per Day  0 secs    Average AHI: 0.5 /hr which reflects significantly improved sleep breathing condition. [place] Sleepiness Score: 5   Modified F.O.S.Q. Score Total / 2: 18.5   which reflects significantly improved sleep quality over therapy time. Sleep Review of Systems: notable for Negative difficulty falling asleep; Negative awakenings at night; Negative early morning headaches; Negative memory problems; Negative concentration issues;  Negative chest pain; Negative shortness of breath; Negative significant joint pain at night; Negative significant muscle pain at night; Negative rashes or itching; Negative heartburn; Negative significant mood issues; No afternoon naps per week    Vitals reported by patient     Patient-Reported Vitals 2/2/2021   Patient-Reported Weight 215 lb      Calculated BMI 35.98 kg/m2    Physical Exam completed by visual and auditory observation of patient with verbal input from patient. General:   Alert, oriented, not in acute distress   Eyes:  Anicteric Sclerae; no obvious strabismus   Nose:  No obvious nasal septum deviation    Neck:   Midline trachea, no visible mass   Chest/Lungs:  Respiratory effort normal, no visualized signs of difficulty breathing or respiratory distress   CVS:  No JVD   Extremities:  No obvious rashes noted on face, neck, or hands   Neuro:  No facial asymmetry, no focal deficits; no obvious tremor    Psych:  Normal affect,  normal countenance     Lobo Carrera is being evaluated by a Virtual Visit (video visit) encounter to address concerns as mentioned above. A caregiver was present when appropriate. Due to this being a TeleHealth encounter (During AFWBW-00 public health emergency), evaluation of the following organ systems was limited: Vitals/Constitutional/EENT/Resp/CV/GI//MS/Neuro/Skin/Heme-Lymph-Imm. Pursuant to the emergency declaration under the [address] [place] [address] and the [employer] Act, this Virtual Visit was conducted with patient's (and/or legal guardian's) consent, to reduce the patient's risk of exposure to COVID-19 and provide necessary medical care. Patient identification was verified at the start of the visit: YES using name and date of birth. Patient's phone number [phone] (home)  was confirmed for accuracy.   She gives permission for messages regarding results and appointments to be left at that number. Services were provided through a video synchronous discussion virtually to substitute for in-person clinic visit. I was in the office while conducting this encounter, patient located at their home or alternate location of their choice. An electronic signature was used to authenticate this note. Geno Bronson MD, [facility]  Electronically signed.  02/02/21

## 2021-03-10 DIAGNOSIS — E11.9 CONTROLLED TYPE 2 DIABETES MELLITUS WITHOUT COMPLICATION, WITHOUT LONG-TERM CURRENT USE OF INSULIN (HCC): ICD-10-CM

## 2021-03-10 RX ORDER — LOSARTAN POTASSIUM AND HYDROCHLOROTHIAZIDE 25; 100 MG/1; MG/1
1 TABLET ORAL DAILY
Qty: 90 TAB | Refills: 0 | Status: SHIPPED | OUTPATIENT
Start: 2021-03-10 | End: 2021-06-06 | Stop reason: SDUPTHER

## 2021-03-10 RX ORDER — METFORMIN HYDROCHLORIDE 500 MG/1
500 TABLET ORAL 2 TIMES DAILY WITH MEALS
Qty: 180 TAB | Refills: 0 | Status: SHIPPED | OUTPATIENT
Start: 2021-03-10 | End: 2021-06-06 | Stop reason: SDUPTHER

## 2021-06-01 ENCOUNTER — OFFICE VISIT (OUTPATIENT)
Dept: PRIMARY CARE CLINIC | Age: 71
End: 2021-06-01
Payer: MEDICARE

## 2021-06-01 VITALS
BODY MASS INDEX: 37.97 KG/M2 | HEIGHT: 64 IN | RESPIRATION RATE: 15 BRPM | OXYGEN SATURATION: 96 % | HEART RATE: 81 BPM | WEIGHT: 222.4 LBS | TEMPERATURE: 98 F | DIASTOLIC BLOOD PRESSURE: 92 MMHG | SYSTOLIC BLOOD PRESSURE: 140 MMHG

## 2021-06-01 DIAGNOSIS — I10 ESSENTIAL HYPERTENSION, BENIGN: ICD-10-CM

## 2021-06-01 DIAGNOSIS — E11.9 CONTROLLED TYPE 2 DIABETES MELLITUS WITHOUT COMPLICATION, WITHOUT LONG-TERM CURRENT USE OF INSULIN (HCC): ICD-10-CM

## 2021-06-01 DIAGNOSIS — N39.0 URINARY TRACT INFECTION WITHOUT HEMATURIA, SITE UNSPECIFIED: Primary | ICD-10-CM

## 2021-06-01 LAB
BILIRUB UR QL STRIP: NEGATIVE
GLUCOSE UR-MCNC: NEGATIVE MG/DL
KETONES P FAST UR STRIP-MCNC: NEGATIVE MG/DL
PH UR STRIP: 7.5 [PH] (ref 4.6–8)
PROT UR QL STRIP: NEGATIVE
SP GR UR STRIP: 1.03 (ref 1–1.03)
UA UROBILINOGEN AMB POC: NORMAL (ref 0.2–1)
URINALYSIS CLARITY POC: NORMAL
URINALYSIS COLOR POC: YELLOW
URINE BLOOD POC: NEGATIVE
URINE LEUKOCYTES POC: NORMAL
URINE NITRITES POC: NEGATIVE

## 2021-06-01 PROCEDURE — 1101F PT FALLS ASSESS-DOCD LE1/YR: CPT | Performed by: INTERNAL MEDICINE

## 2021-06-01 PROCEDURE — G8536 NO DOC ELDER MAL SCRN: HCPCS | Performed by: INTERNAL MEDICINE

## 2021-06-01 PROCEDURE — G8399 PT W/DXA RESULTS DOCUMENT: HCPCS | Performed by: INTERNAL MEDICINE

## 2021-06-01 PROCEDURE — 3017F COLORECTAL CA SCREEN DOC REV: CPT | Performed by: INTERNAL MEDICINE

## 2021-06-01 PROCEDURE — 2022F DILAT RTA XM EVC RTNOPTHY: CPT | Performed by: INTERNAL MEDICINE

## 2021-06-01 PROCEDURE — 99214 OFFICE O/P EST MOD 30 MIN: CPT | Performed by: INTERNAL MEDICINE

## 2021-06-01 PROCEDURE — 81003 URINALYSIS AUTO W/O SCOPE: CPT | Performed by: INTERNAL MEDICINE

## 2021-06-01 PROCEDURE — G8417 CALC BMI ABV UP PARAM F/U: HCPCS | Performed by: INTERNAL MEDICINE

## 2021-06-01 PROCEDURE — G8754 DIAS BP LESS 90: HCPCS | Performed by: INTERNAL MEDICINE

## 2021-06-01 PROCEDURE — 1090F PRES/ABSN URINE INCON ASSESS: CPT | Performed by: INTERNAL MEDICINE

## 2021-06-01 PROCEDURE — 3046F HEMOGLOBIN A1C LEVEL >9.0%: CPT | Performed by: INTERNAL MEDICINE

## 2021-06-01 PROCEDURE — G8427 DOCREV CUR MEDS BY ELIG CLIN: HCPCS | Performed by: INTERNAL MEDICINE

## 2021-06-01 PROCEDURE — G8510 SCR DEP NEG, NO PLAN REQD: HCPCS | Performed by: INTERNAL MEDICINE

## 2021-06-01 PROCEDURE — G8753 SYS BP > OR = 140: HCPCS | Performed by: INTERNAL MEDICINE

## 2021-06-01 RX ORDER — CEFUROXIME AXETIL 500 MG/1
500 TABLET ORAL 2 TIMES DAILY
Qty: 14 TABLET | Refills: 0 | Status: SHIPPED | OUTPATIENT
Start: 2021-06-01 | End: 2021-06-08

## 2021-06-01 NOTE — PROGRESS NOTES
Mili Waterman (: 1950) is a 79 y.o. female, established patient, here for evaluation of the following chief complaint(s):  UTI (three days, burning and frequency )     Written by Ladon Kussmaul, as dictated by Dr. Magalie Anderson MD.      ASSESSMENT/PLAN:  Below is the assessment and plan developed based on review of pertinent history, physical exam, labs, studies, and medications. 1. Urinary tract infection without hematuria, site unspecified  Urinalysis in office today is positive for leukocytes. Prescribed a course of Ceftin 500mg BID x7 days. Potential side effects were discussed. Will send her urine out for culture. Discussed that she may have an infection due to elevated BS readings. -     AMB POC URINALYSIS DIP STICK AUTO W/O MICRO  -     CULTURE, URINE; Future  -     cefUROXime (CEFTIN) 500 mg tablet; Take 1 Tablet by mouth two (2) times a day for 7 days. , Normal, Disp-14 Tablet, R-0 sent to pharmacy. 2. Controlled type 2 diabetes mellitus without complication, without long-term current use of insulin (HCC)  Continue on metformin 500mg BID. She should schedule a follow-up in the near future to have lab work checked. 3. Essential hypertension, benign   Her BP is elevated, but likely due to poor sleep from UTI sxs. Continue on losartan-HCTZ 100-25mg daily. Recommended checking readings at home. Follow up in next 3-4 weeks. SUBJECTIVE/OBJECTIVE:  HPI  The patient presents today c/o dysuria and urinary frequency x3 days. She has not been sleeping well due to sxs. She is on metformin 500mg BID for diabetes. She is not checking her BS at home. Her BP today is elevated at 150/87, 140/92 on manual repeat. She is not monitoring her BP at home. She does get occ headaches, but does not check her BP during headache episodes.  She is on losartan-HCTZ 100-25mg daily for HTN and took her medication this AM.     She states that she had a recent mammogram.     Patient Active Problem List Diagnosis Code    Essential hypertension, benign I10    Mixed hyperlipidemia E78.2    Esophageal reflux K21.9    Sleep apnea, obstructive G47.33        Current Outpatient Medications on File Prior to Visit   Medication Sig Dispense Refill    metFORMIN (GLUCOPHAGE) 500 mg tablet Take 1 Tab by mouth two (2) times daily (with meals) for 90 days. 180 Tab 0    losartan-hydroCHLOROthiazide (HYZAAR) 100-25 mg per tablet Take 1 Tab by mouth daily for 90 days. 90 Tab 0    Cholecalciferol, Vitamin D3, (VITAMIN D) 1,000 unit Cap Take  by mouth.  VIT C/VITAMIN E ACETATE/CRANB (CRANBERRY CONC-ASCORBIC ACID PO) Take  by mouth.  multivitamin, stress formula (STRESS TAB) tablet Take 1 Tab by mouth daily.  FLAXSEED OIL PO Take  by mouth.  CALCIUM PO Take 500 mg by mouth two (2) times a day.  DOCOSAHEXANOIC ACID/EPA (FISH OIL PO) Take 1,000 mg by mouth daily. 2 tabs qd      red yeast rice extract 600 mg cap Take 600 mg by mouth now. (Patient not taking: Reported on 6/1/2021)       No current facility-administered medications on file prior to visit.        Allergies   Allergen Reactions    Codeine Nausea Only     GI    Macrodantin [Nitrofurantoin Macrocrystalline] Itching    Tenoretic 100 [Atenolol-Chlorthalidone] Other (comments)     fatigue    Zocor [Simvastatin] Swelling       Past Medical History:   Diagnosis Date    Esophageal reflux 4/13/2010    Essential hypertension, benign 4/13/2010    Mixed hyperlipidemia 4/13/2010    Obesity, unspecified 4/13/2010    CINTHIA (obstructive sleep apnea) 08-    AHI: 5.6 per hour       Past Surgical History:   Procedure Laterality Date    HX GYN      HX TUBAL LIGATION      NC BREAST SURGERY PROCEDURE UNLISTED      breast biopsy       Family History   Problem Relation Age of Onset    Cancer Mother         breast    Cancer Brother         leukemia    Cancer Sister        Social History     Socioeconomic History    Marital status:  Spouse name: Not on file    Number of children: Not on file    Years of education: Not on file    Highest education level: Not on file   Occupational History    Not on file   Tobacco Use    Smoking status: Never Smoker    Smokeless tobacco: Never Used   Vaping Use    Vaping Use: Never used   Substance and Sexual Activity    Alcohol use: No    Drug use: No    Sexual activity: Not Currently   Other Topics Concern     Service No    Blood Transfusions No    Caffeine Concern No     Comment: 2 cups of coffee daily    Occupational Exposure No    Hobby Hazards No    Sleep Concern No    Stress Concern No    Weight Concern Yes    Special Diet No    Back Care Yes    Exercise No    Bike Helmet No    Seat Belt Yes    Self-Exams No   Social History Narrative    Not on file     Social Determinants of Health     Financial Resource Strain:     Difficulty of Paying Living Expenses:    Food Insecurity:     Worried About Running Out of Food in the Last Year:     Ran Out of Food in the Last Year:    Transportation Needs:     Lack of Transportation (Medical):      Lack of Transportation (Non-Medical):    Physical Activity:     Days of Exercise per Week:     Minutes of Exercise per Session:    Stress:     Feeling of Stress :    Social Connections:     Frequency of Communication with Friends and Family:     Frequency of Social Gatherings with Friends and Family:     Attends Sikhism Services:     Active Member of Clubs or Organizations:     Attends Club or Organization Meetings:     Marital Status:    Intimate Partner Violence:     Fear of Current or Ex-Partner:     Emotionally Abused:     Physically Abused:     Sexually Abused:        Office Visit on 06/01/2021   Component Date Value Ref Range Status    Color (UA POC) 06/01/2021 Yellow   Final    Clarity (UA POC) 06/01/2021 Slightly Cloudy   Final    Glucose (UA POC) 06/01/2021 Negative  Negative Final    Bilirubin (UA POC) 06/01/2021 Negative  Negative Final    Ketones (UA POC) 06/01/2021 Negative  Negative Final    Specific gravity (UA POC) 06/01/2021 1.030  1.001 - 1.035 Final    Blood (UA POC) 06/01/2021 Negative  Negative Final    pH (UA POC) 06/01/2021 7.5  4.6 - 8.0 Final    Protein (UA POC) 06/01/2021 Negative  Negative Final    Urobilinogen (UA POC) 06/01/2021 0.2 mg/dL  0.2 - 1 Final    Nitrites (UA POC) 06/01/2021 Negative  Negative Final    Leukocyte esterase (UA POC) 06/01/2021 1+  Negative Final      Review of Systems   Constitutional: Negative for activity change, fatigue and unexpected weight change. HENT: Negative for congestion, hearing loss, rhinorrhea and sore throat. Eyes: Negative for discharge. Respiratory: Negative for cough, chest tightness and shortness of breath. Cardiovascular: Negative for leg swelling. Gastrointestinal: Negative for abdominal pain, constipation and diarrhea. Genitourinary: Positive for dysuria and frequency. Negative for flank pain and urgency. Musculoskeletal: Negative for arthralgias, back pain and myalgias. Skin: Negative for color change and rash. Neurological: Positive for headaches (occasional). Negative for dizziness and light-headedness. Psychiatric/Behavioral: Negative for dysphoric mood and sleep disturbance. The patient is not nervous/anxious. Visit Vitals  BP (!) 140/92 (BP 1 Location: Left arm, BP Patient Position: Sitting)   Pulse 81   Temp 98 °F (36.7 °C)   Resp 15   Ht 5' 4\" (1.626 m)   Wt 222 lb 6.4 oz (100.9 kg)   SpO2 96%   BMI 38.17 kg/m²      Physical Exam  Vitals and nursing note reviewed. Constitutional:       General: She is not in acute distress. Appearance: Normal appearance. She is well-developed. She is not diaphoretic. HENT:      Right Ear: External ear normal.      Left Ear: External ear normal.   Eyes:      General: No scleral icterus. Right eye: No discharge. Left eye: No discharge.       Extraocular Movements: Extraocular movements intact. Conjunctiva/sclera: Conjunctivae normal.   Cardiovascular:      Rate and Rhythm: Normal rate and regular rhythm. Pulmonary:      Effort: Pulmonary effort is normal.      Breath sounds: Normal breath sounds. No wheezing. Abdominal:      General: Bowel sounds are normal.      Palpations: Abdomen is soft. Tenderness: There is no abdominal tenderness. Musculoskeletal:      Cervical back: Normal range of motion and neck supple. Lymphadenopathy:      Cervical: No cervical adenopathy. Neurological:      Mental Status: She is alert and oriented to person, place, and time. Psychiatric:         Mood and Affect: Mood and affect normal.         An electronic signature was used to authenticate this note.   -- German Yuan

## 2021-06-01 NOTE — PROGRESS NOTES
Chief Complaint   Patient presents with    UTI     three days, burning and frequency        Visit Vitals  BP (!) 150/87 (BP 1 Location: Right arm)   Pulse 81   Temp 98 °F (36.7 °C)   Resp 15   Ht 5' 4\" (1.626 m)   Wt 222 lb 6.4 oz (100.9 kg)   SpO2 96%   BMI 38.17 kg/m²       1. Have you been to the ER, urgent care clinic since your last visit? Hospitalized since your last visit? No    2. Have you seen or consulted any other health care providers outside of the [address] [place] [address] since your last visit? Include any pap smears or colon screening.  Yes [address] [employer]

## 2021-06-06 DIAGNOSIS — E11.9 CONTROLLED TYPE 2 DIABETES MELLITUS WITHOUT COMPLICATION, WITHOUT LONG-TERM CURRENT USE OF INSULIN (HCC): ICD-10-CM

## 2021-06-07 RX ORDER — LOSARTAN POTASSIUM AND HYDROCHLOROTHIAZIDE 25; 100 MG/1; MG/1
1 TABLET ORAL DAILY
Qty: 90 TABLET | Refills: 0 | Status: SHIPPED | OUTPATIENT
Start: 2021-06-07 | End: 2021-08-29 | Stop reason: SDUPTHER

## 2021-06-07 RX ORDER — METFORMIN HYDROCHLORIDE 500 MG/1
500 TABLET ORAL 2 TIMES DAILY WITH MEALS
Qty: 180 TABLET | Refills: 0 | Status: SHIPPED | OUTPATIENT
Start: 2021-06-07 | End: 2021-08-29 | Stop reason: SDUPTHER

## 2021-06-07 NOTE — TELEPHONE ENCOUNTER
Requested Prescriptions     Pending Prescriptions Disp Refills    metFORMIN (GLUCOPHAGE) 500 mg tablet 180 Tablet 0     Sig: Take 1 Tablet by mouth two (2) times daily (with meals) for 90 days.  losartan-hydroCHLOROthiazide (HYZAAR) 100-25 mg per tablet 90 Tablet 0     Sig: Take 1 Tablet by mouth daily for 90 days.         Last Visit 6/1/21  Last Refill  Metformin 3/10/21  Losartan 3/10/21

## 2021-07-02 ENCOUNTER — OFFICE VISIT (OUTPATIENT)
Dept: PRIMARY CARE CLINIC | Age: 71
End: 2021-07-02
Payer: MEDICARE

## 2021-07-02 VITALS
SYSTOLIC BLOOD PRESSURE: 136 MMHG | WEIGHT: 217 LBS | DIASTOLIC BLOOD PRESSURE: 88 MMHG | HEART RATE: 94 BPM | OXYGEN SATURATION: 97 % | TEMPERATURE: 98.1 F | BODY MASS INDEX: 37.05 KG/M2 | HEIGHT: 64 IN

## 2021-07-02 DIAGNOSIS — I10 ESSENTIAL HYPERTENSION: ICD-10-CM

## 2021-07-02 DIAGNOSIS — N39.0 URINARY TRACT INFECTION WITHOUT HEMATURIA, SITE UNSPECIFIED: Primary | ICD-10-CM

## 2021-07-02 DIAGNOSIS — E66.9 OBESITY (BMI 30-39.9): ICD-10-CM

## 2021-07-02 LAB
BILIRUB UR QL STRIP: NEGATIVE
GLUCOSE UR-MCNC: NEGATIVE MG/DL
KETONES P FAST UR STRIP-MCNC: NEGATIVE MG/DL
PH UR STRIP: 7 [PH] (ref 4.6–8)
PROT UR QL STRIP: NEGATIVE
SP GR UR STRIP: 1.02 (ref 1–1.03)
UA UROBILINOGEN AMB POC: NORMAL (ref 0.2–1)
URINALYSIS CLARITY POC: NORMAL
URINALYSIS COLOR POC: YELLOW
URINE BLOOD POC: NEGATIVE
URINE LEUKOCYTES POC: NORMAL
URINE NITRITES POC: NEGATIVE

## 2021-07-02 PROCEDURE — 1101F PT FALLS ASSESS-DOCD LE1/YR: CPT | Performed by: INTERNAL MEDICINE

## 2021-07-02 PROCEDURE — G8427 DOCREV CUR MEDS BY ELIG CLIN: HCPCS | Performed by: INTERNAL MEDICINE

## 2021-07-02 PROCEDURE — 1090F PRES/ABSN URINE INCON ASSESS: CPT | Performed by: INTERNAL MEDICINE

## 2021-07-02 PROCEDURE — 81003 URINALYSIS AUTO W/O SCOPE: CPT | Performed by: INTERNAL MEDICINE

## 2021-07-02 PROCEDURE — G8754 DIAS BP LESS 90: HCPCS | Performed by: INTERNAL MEDICINE

## 2021-07-02 PROCEDURE — G8417 CALC BMI ABV UP PARAM F/U: HCPCS | Performed by: INTERNAL MEDICINE

## 2021-07-02 PROCEDURE — 3017F COLORECTAL CA SCREEN DOC REV: CPT | Performed by: INTERNAL MEDICINE

## 2021-07-02 PROCEDURE — G8752 SYS BP LESS 140: HCPCS | Performed by: INTERNAL MEDICINE

## 2021-07-02 PROCEDURE — G8399 PT W/DXA RESULTS DOCUMENT: HCPCS | Performed by: INTERNAL MEDICINE

## 2021-07-02 PROCEDURE — G8510 SCR DEP NEG, NO PLAN REQD: HCPCS | Performed by: INTERNAL MEDICINE

## 2021-07-02 PROCEDURE — G8536 NO DOC ELDER MAL SCRN: HCPCS | Performed by: INTERNAL MEDICINE

## 2021-07-02 PROCEDURE — 99213 OFFICE O/P EST LOW 20 MIN: CPT | Performed by: INTERNAL MEDICINE

## 2021-07-02 RX ORDER — CIPROFLOXACIN 500 MG/1
500 TABLET ORAL 2 TIMES DAILY
Qty: 20 TABLET | Refills: 0 | Status: SHIPPED | OUTPATIENT
Start: 2021-07-02 | End: 2021-07-12

## 2021-07-02 NOTE — PROGRESS NOTES
Usha Lopez is a 79 y.o. female    Chief Complaint   Patient presents with    Urinary Frequency     burning sensation,  (here a month ago with same symptoms); intermittent frequency and burning; taking otc cranberry supplements       1. Have you been to the ER, urgent care clinic since your last visit? Hospitalized since your last visit? No    2. Have you seen or consulted any other health care providers outside of the [address] [place] [address] since your last visit? Include any pap smears or colon screening.  [facility], [facility] AT [place]    Visit Vitals  BP (!) 160/88 (BP 1 Location: Left upper arm, BP Patient Position: Sitting)   Pulse 94   Temp 98.1 °F (36.7 °C) (Oral)   Ht 5' 4\" (1.626 m)   Wt 217 lb (98.4 kg)   SpO2 97%   BMI 37.25 kg/m²

## 2021-07-02 NOTE — PROGRESS NOTES
Camille Galvin (: 1950) is a 79 y.o. female, established patient, here for evaluation of the following chief complaint(s):  Urinary Frequency (burning sensation,  (here a month ago with same symptoms); intermittent frequency and burning; taking otc cranberry supplements)   Written by Krystal Peers, as dictated by Dr. Cathy Bales MD.      ASSESSMENT/PLAN:  Below is the assessment and plan developed based on review of pertinent history, physical exam, labs, studies, and medications. 1. Urinary tract infection without hematuria, site unspecified  Ordered urine culture and urinalysis to follow up on UTI. Waiting for results. Prescribed Cipro 500 mg. Take medication as prescribed. Potential side effects were discussed. -     AMB POC URINALYSIS DIP STICK AUTO W/O MICRO  -     CULTURE, URINE; Future  -     ciprofloxacin HCl (CIPRO) 500 mg tablet; Take 1 Tablet by mouth two (2) times a day for 10 days. , Normal, Disp-20 Tablet, R-0 sent to pharmacy. 2. Essential hypertension  Continue taking losartan-HCTZ 100-25 mg as prescribed. 3. Obesity (BMI 30-39. 9)  I commended the pt on her healthy lifestyle choices and routines. Explained that 5,000 steps are needed daily for healthy lifestyle and to maintain conditioning, and she will need to increase to 10,000 a day to work on weight loss. SUBJECTIVE/OBJECTIVE:  HPI   Patient presents today for a follow up visit. Her urinalysis on 21 showed leukocytes in urine. She has had intermittent sxs of dysuria and urinary frequency for the past month. Ceftin and drinking cranberry juice helped with sxs temporarily. Her BP today is 160/88, 136/88 manual repeat. She takes losartan-HCTZ for HTN. She does not check her BP at home. She has lost weight from 222 lb on 21 to 217 lb today. She has been eating more salads and avoiding bread, potatoes, and butter.   Patient Active Problem List   Diagnosis Code    Essential hypertension, benign I10    Mixed hyperlipidemia E78.2    Esophageal reflux K21.9    Sleep apnea, obstructive G47.33        Current Outpatient Medications on File Prior to Visit   Medication Sig Dispense Refill    metFORMIN (GLUCOPHAGE) 500 mg tablet Take 1 Tablet by mouth two (2) times daily (with meals) for 90 days. 180 Tablet 0    losartan-hydroCHLOROthiazide (HYZAAR) 100-25 mg per tablet Take 1 Tablet by mouth daily for 90 days. 90 Tablet 0    Cholecalciferol, Vitamin D3, (VITAMIN D) 1,000 unit Cap Take  by mouth.  VIT C/VITAMIN E ACETATE/CRANB (CRANBERRY CONC-ASCORBIC ACID PO) Take  by mouth.  multivitamin, stress formula (STRESS TAB) tablet Take 1 Tab by mouth daily.  FLAXSEED OIL PO Take  by mouth.  CALCIUM PO Take 500 mg by mouth two (2) times a day.  DOCOSAHEXANOIC ACID/EPA (FISH OIL PO) Take 1,000 mg by mouth daily. 2 tabs qd      red yeast rice extract 600 mg cap Take 600 mg by mouth now. (Patient not taking: Reported on 6/1/2021)       No current facility-administered medications on file prior to visit.        Allergies   Allergen Reactions    Nitrofurantoin Monohyd/M-Cryst Itching    Codeine Nausea Only     GI    Macrodantin [Nitrofurantoin Macrocrystalline] Itching    Tenoretic 100 [Atenolol-Chlorthalidone] Other (comments)     fatigue    Zocor [Simvastatin] Swelling       Past Medical History:   Diagnosis Date    Esophageal reflux 4/13/2010    Essential hypertension, benign 4/13/2010    Mixed hyperlipidemia 4/13/2010    Obesity, unspecified 4/13/2010    CINTHIA (obstructive sleep apnea) 08-    AHI: 5.6 per hour       Past Surgical History:   Procedure Laterality Date    HX GYN      HX TUBAL LIGATION      IL BREAST SURGERY PROCEDURE UNLISTED      breast biopsy       Family History   Problem Relation Age of Onset    Cancer Mother         breast    Cancer Brother         leukemia    Cancer Sister        Social History     Socioeconomic History    Marital status:      Spouse name: Not on file    Number of children: Not on file    Years of education: Not on file    Highest education level: Not on file   Occupational History    Not on file   Tobacco Use    Smoking status: Never Smoker    Smokeless tobacco: Never Used   Vaping Use    Vaping Use: Never used   Substance and Sexual Activity    Alcohol use: No    Drug use: No    Sexual activity: Not Currently   Other Topics Concern     Service No    Blood Transfusions No    Caffeine Concern No     Comment: 2 cups of coffee daily    Occupational Exposure No    Hobby Hazards No    Sleep Concern No    Stress Concern No    Weight Concern Yes    Special Diet No    Back Care Yes    Exercise No    Bike Helmet No    Seat Belt Yes    Self-Exams No   Social History Narrative    Not on file     Social Determinants of Health     Financial Resource Strain:     Difficulty of Paying Living Expenses:    Food Insecurity:     Worried About Running Out of Food in the Last Year:     Ran Out of Food in the Last Year:    Transportation Needs:     Lack of Transportation (Medical):      Lack of Transportation (Non-Medical):    Physical Activity:     Days of Exercise per Week:     Minutes of Exercise per Session:    Stress:     Feeling of Stress :    Social Connections:     Frequency of Communication with Friends and Family:     Frequency of Social Gatherings with Friends and Family:     Attends Oriental orthodox Services:     Active Member of Clubs or Organizations:     Attends Club or Organization Meetings:     Marital Status:    Intimate Partner Violence:     Fear of Current or Ex-Partner:     Emotionally Abused:     Physically Abused:     Sexually Abused:        Office Visit on 07/02/2021   Component Date Value Ref Range Status    Color (UA POC) 07/02/2021 Yellow   Final    Clarity (UA POC) 07/02/2021 Cloudy   Final    Glucose (UA POC) 07/02/2021 Negative  Negative Final    Bilirubin (UA POC) 07/02/2021 Negative Negative Final    Ketones (UA POC) 07/02/2021 Negative  Negative Final    Specific gravity (UA POC) 07/02/2021 1.025  1.001 - 1.035 Final    Blood (UA POC) 07/02/2021 Negative  Negative Final    pH (UA POC) 07/02/2021 7.0  4.6 - 8.0 Final    Protein (UA POC) 07/02/2021 Negative  Negative Final    Urobilinogen (UA POC) 07/02/2021 0.2 mg/dL  0.2 - 1 Final    Nitrites (UA POC) 07/02/2021 Negative  Negative Final    Leukocyte esterase (UA POC) 07/02/2021 1+  Negative Final   Office Visit on 06/01/2021   Component Date Value Ref Range Status    Color (UA POC) 06/01/2021 Yellow   Final    Clarity (UA POC) 06/01/2021 Slightly Cloudy   Final    Glucose (UA POC) 06/01/2021 Negative  Negative Final    Bilirubin (UA POC) 06/01/2021 Negative  Negative Final    Ketones (UA POC) 06/01/2021 Negative  Negative Final    Specific gravity (UA POC) 06/01/2021 1.030  1.001 - 1.035 Final    Blood (UA POC) 06/01/2021 Negative  Negative Final    pH (UA POC) 06/01/2021 7.5  4.6 - 8.0 Final    Protein (UA POC) 06/01/2021 Negative  Negative Final    Urobilinogen (UA POC) 06/01/2021 0.2 mg/dL  0.2 - 1 Final    Nitrites (UA POC) 06/01/2021 Negative  Negative Final    Leukocyte esterase (UA POC) 06/01/2021 1+  Negative Final      Review of Systems   Constitutional: Negative for activity change, fatigue and unexpected weight change. HENT: Negative for congestion, hearing loss, rhinorrhea and sore throat. Eyes: Negative for discharge. Respiratory: Negative for cough, chest tightness and shortness of breath. Cardiovascular: Negative for leg swelling. Gastrointestinal: Negative for abdominal pain, constipation and diarrhea. Genitourinary: Positive for dysuria and frequency. Negative for flank pain and urgency. Musculoskeletal: Negative for arthralgias, back pain and myalgias. Skin: Negative for color change and rash. Neurological: Negative for dizziness, light-headedness and headaches. Psychiatric/Behavioral: Negative for dysphoric mood and sleep disturbance. The patient is not nervous/anxious. Visit Vitals  /88 (BP 1 Location: Left arm)   Pulse 94   Temp 98.1 °F (36.7 °C) (Oral)   Ht 5' 4\" (1.626 m)   Wt 217 lb (98.4 kg)   SpO2 97%   BMI 37.25 kg/m²      Physical Exam  Vitals and nursing note reviewed. Constitutional:       General: She is not in acute distress. Appearance: Normal appearance. She is well-developed. She is not diaphoretic. HENT:      Right Ear: External ear normal.      Left Ear: External ear normal.   Eyes:      General: No scleral icterus. Right eye: No discharge. Left eye: No discharge. Extraocular Movements: Extraocular movements intact. Conjunctiva/sclera: Conjunctivae normal.   Cardiovascular:      Rate and Rhythm: Normal rate and regular rhythm. Pulmonary:      Effort: Pulmonary effort is normal.      Breath sounds: Normal breath sounds. No wheezing. Abdominal:      General: Bowel sounds are normal.      Palpations: Abdomen is soft. Tenderness: There is no abdominal tenderness. Musculoskeletal:      Cervical back: Normal range of motion and neck supple. Lymphadenopathy:      Cervical: No cervical adenopathy. Neurological:      Mental Status: She is alert and oriented to person, place, and time. Psychiatric:         Mood and Affect: Mood and affect normal.             An electronic signature was used to authenticate this note.   -- Rodriguez Garvin

## 2021-07-04 LAB
BACTERIA SPEC CULT: NORMAL
CC UR VC: NORMAL
SERVICE CMNT-IMP: NORMAL

## 2021-07-05 NOTE — PROGRESS NOTES
Results reviewed. Release via [employer], your urine culture report came back fine. No specific bacteria grew. How are you feeling?

## 2021-08-05 ENCOUNTER — TELEPHONE (OUTPATIENT)
Dept: SLEEP MEDICINE | Age: 71
End: 2021-08-05

## 2021-08-05 DIAGNOSIS — G47.33 OSA (OBSTRUCTIVE SLEEP APNEA): Primary | ICD-10-CM

## 2021-08-05 NOTE — TELEPHONE ENCOUNTER
PATIENT RESPONSE / FOLLOW UP NEEDS  Patient requesting referral for mandibular device. RECALL EDUCATION PROVIDED    Are you aware of why there is a recall? Yes. Found out from a friend and has registered device. You can visit the [facility] for more information. Recall is related specifically to the type of foam used to reduce the noise made by the device. Over time, the foam may break down into small black particles that have the potential to be inhaled. Ana Luisa Harrison has also done testing that shows breakdown of the foam can release unsafe gases. In 2020, the complaint rate for foam particles was low (0.03%)  NO REPORTS OF DEATH  NO COMPLAINTS OF CHEMICAL EXPOSURE  Potential risks include  Headache  Irritation of the airway, skin, or eyes  Asthma  Nausea or Vomiting    RECOMMENDATIONS:    The first step is to make sure we have your device added to the [employer] in order to begin a claim. Have you already been contacted by [employer] or your medical equipment provider? Yes    Based on [employer] communication, you should stop using your equipment, but it is important that you consider the risks of both continuing and discontinuing the use of your device to treat your sleep apnea. If you choose to continue using your machine, it is at your own discretion. There is not a clear answer, and this is a difficult situation. As for actions, you should register your device by phone or access the website as explained earlier. It is also recommended that you DO NOT USE any type of CPAP cleaning machines or harsh chemicals. We have heard that the use of ozone or other cleaning devices may be contributing to the problem by increasing the rate at which the foam can potentially break down. Therefore, we advise you to follow the 's instructions to clean your tube, tank, and mask with mild soap and water.  If you are using ozone products to clean your PAP device, [facility] advises you to STOP. If you choose to stop using your device, you may benefit from the use of alternate therapies such as  Positional changes  Avoid sleeping flat or on your back, elevate the head of your bed with pillows or other positional devices such as slumber bumper, sleep noodle, wedge. Sleeping in a recliner  Mandibular devices that reposition jaw for better airflow. You can get a referral for this from you doctor if you would like to follow up with this option. Use of inline bacteria filter. Both [facility] and the FDA have noted that additional bacterial filters are not designed or intended for use with APAP devices and may cause device issues. Additionally, these filters do not resolve vapor or fume concerns from the foam in the devices. Because these are not FDA approved, we cannot order these filters for you, but other patients have elected to purchase them on their own through a medical equipment company or online. If you chose to use an inline filter, it is important that you research how it may affect the performance of your device. If the filter becomes clogged, you will not be getting the prescribed amount of pressure, and it could further change how the CPAP operates. COMMUNICATION  The fastest way for us to communicate with you is through [address] Are you signed up and active with this? yes  Do we have your current Email Address?  yes  Would you like to give permission to receive information via email and text as well? yes

## 2021-08-06 NOTE — TELEPHONE ENCOUNTER
Recommend HSAT prior to referral for OAT since diagnostic testing was done over a year previously.     Orders Placed This Encounter    SLEEP STUDY UNATTENDED, 4 CHANNEL     Order Specific Question:   Reason for Exam     Answer:   CINTHIA

## 2021-08-10 NOTE — TELEPHONE ENCOUNTER
PATIENT RESPONSE / FOLLOW UP NEEDS  Patient has changed her mind about mandibular device. Patient plans to continue use of device.

## 2021-08-19 ENCOUNTER — TELEPHONE (OUTPATIENT)
Dept: PHARMACY | Age: 71
End: 2021-08-19

## 2021-08-19 NOTE — PROGRESS NOTES
Kin Rae MD - would patient benefit from statin therapy, such as atorvastatin 40 mg DAILY?    - Due to pt's elevated ASCVD risk (24.4%), DM & HTN diagnoses, and obese, consider adding high-intensity statin    Please let me know if I can further assist, or if you would like to discuss any further. Thank you,  Hal Curling, PharmD  PGY1 Pharmacy Resident  [facility] Rotation  Department, toll free: [phone], option 2    ==============================================================  POPULATION HEALTH CLINICAL PHARMACY: STATIN THERAPY REVIEW  Identified statin use in persons with diabetes care gap per [facility] Records dated: 08/09/2021. Last Visit: 07/02/2021    Patient also appears to be prescribed: ARB, HCTZ    Patient found in Outcomes MTM and is currently eligible for TIP Needs Drug Therapy - Statin (Diabetes)    ASSESSMENT  ACE/ARB ADHERENCE  Per Insurance Records through 08/09/2021 ([facility] [employer] = 100%; Potential Fail Date: 11/03/2021):   - Losartan 100 mg/HCTZ 25 mg last filled on 06/07/2021 for 90 day supply. Next refill due: 09/05/2021    BP Readings from Last 3 Encounters:   07/02/21 136/88     DIABETES ADHERENCE  Per Insurance Records through 08/09/2021 ([employer] = 100%; Potential Fail Date: 11/03/2021):   - Metformin 500 mg TWICE DAILY last filled on 06/07/2021 for 90 day supply. Next refill due: 09/05/2021    Lab Results   Component Value Date/Time    Hemoglobin A1c 4.6 09/02/2020 08:35 AM    Hemoglobin A1c 8.6 (H) 06/05/2020 08:39 AM    Hemoglobin A1c (POC) 5.8 09/08/2020 07:28 AM     STATIN GAP IDENTIFIED    Per chart review, patient is not currently prescribed a statin.     Lab Results   Component Value Date/Time    Cholesterol, total 170 09/02/2020 08:35 AM    HDL Cholesterol 56 09/02/2020 08:35 AM    LDL, calculated 85 09/02/2020 08:35 AM    VLDL, calculated 29 09/02/2020 08:35 AM    Triglyceride 145 09/02/2020 08:35 AM    CHOL/HDL Ratio 3.0 09/02/2020 08:35 AM     ALT (SGPT)   Date Value Ref Range Status   09/02/2020 24 12 - 78 U/L Final     AST (SGOT)   Date Value Ref Range Status   09/02/2020 13 (L) 15 - 37 U/L Final     The 10-year ASCVD risk score (Josephine Hernández, et al., 2013) is: 24.4%    Values used to calculate the score:      Age: 79 years      Sex: Female      Is Non- : No      Diabetic: Yes      Tobacco smoker: No      Systolic Blood Pressure: 590 mmHg      Is BP treated: Yes      HDL Cholesterol: 56 MG/DL      Total Cholesterol: 170 MG/DL     Hyperlipidemia Goal: Patient has a 10-yr ASCVD risk of >20% with DM and is therefore a candidate for high-intensity statin therapy. Other risk factors: pt is obese    PLAN  The following are interventions that have been identified:  - Patient identified as having SUPD gap   - Consider adding atorvastatin 40 mg DAILY    No patient out reach planned at this time.     Future Appointments   Date Time Provider Chun Reece   2/3/2022  3:20 PM [facility]

## 2021-08-23 NOTE — PROGRESS NOTES
PCP responded back to statin recommendation. States that patient is allergic to simvastatin and is unwilling to try another one. Will input  and close encounter.     Juliano Patino, PharmD  PGY1 Pharmacy Resident  [facility] Rotation  Department, toll free: [phone], option 2    =============================================================================    For Pharmacy Admin Tracking Only     CPA in place: No   Recommendation Provided To: Provider: 1 via Note to Provider    Intervention Detail: New Rx: 1, reason: Needs Additional Therapy   Gap Closed?: No   Intervention Accepted By: Provider: 0   Time Spent (min): 10

## 2021-08-27 ENCOUNTER — TELEPHONE (OUTPATIENT)
Dept: PRIMARY CARE CLINIC | Age: 71
End: 2021-08-27

## 2021-08-27 NOTE — TELEPHONE ENCOUNTER
----- Message from Cynthia Lama sent at 8/26/2021  2:55 PM EDT -----  Regarding: Dr Tom Ge first and last name:Eugenio VARELA [facility]      Reason for call: to discuss Rx for Statins to prevent cardiovascular events      Callback required yes/no and why:yes      Best contact number(s):[phone]      Details to clarify the request: Mr Pastor Sharma is requesting a call back to verify if pt has allergies or intolerance to Statin Rxs.    Zachary advised pt would benefit to from Statin Rxs to prevent cardiovascular events      Cynthia Lama

## 2021-08-27 NOTE — TELEPHONE ENCOUNTER
Spoke with pharmacist to notify that is patient has a reaction to statins- he said that there is an ICD code we can put in so the we can stop getting calls from [facility]

## 2021-08-29 DIAGNOSIS — E11.9 CONTROLLED TYPE 2 DIABETES MELLITUS WITHOUT COMPLICATION, WITHOUT LONG-TERM CURRENT USE OF INSULIN (HCC): ICD-10-CM

## 2021-08-30 RX ORDER — METFORMIN HYDROCHLORIDE 500 MG/1
500 TABLET ORAL 2 TIMES DAILY WITH MEALS
Qty: 180 TABLET | Refills: 0 | Status: SHIPPED | OUTPATIENT
Start: 2021-08-30 | End: 2021-12-02

## 2021-08-30 RX ORDER — LOSARTAN POTASSIUM AND HYDROCHLOROTHIAZIDE 25; 100 MG/1; MG/1
1 TABLET ORAL DAILY
Qty: 90 TABLET | Refills: 0 | Status: SHIPPED | OUTPATIENT
Start: 2021-08-30 | End: 2021-12-02

## 2021-08-30 NOTE — TELEPHONE ENCOUNTER
Requested Prescriptions     Pending Prescriptions Disp Refills    metFORMIN (GLUCOPHAGE) 500 mg tablet 180 Tablet 0     Sig: Take 1 Tablet by mouth two (2) times daily (with meals) for 90 days.  losartan-hydroCHLOROthiazide (HYZAAR) 100-25 mg per tablet 90 Tablet 0     Sig: Take 1 Tablet by mouth daily for 90 days.         Last Visit 7/2/21  Last Refill 6/7/21

## 2021-09-01 ENCOUNTER — HOSPITAL ENCOUNTER (EMERGENCY)
Age: 71
Discharge: HOME | End: 2021-09-01
Attending: STUDENT IN AN ORGANIZED HEALTH CARE EDUCATION/TRAINING PROGRAM
Payer: MEDICARE

## 2021-09-01 VITALS
HEART RATE: 77 BPM | TEMPERATURE: 97.8 F | BODY MASS INDEX: 36.7 KG/M2 | OXYGEN SATURATION: 99 % | HEIGHT: 64 IN | RESPIRATION RATE: 16 BRPM | WEIGHT: 215 LBS | DIASTOLIC BLOOD PRESSURE: 88 MMHG | SYSTOLIC BLOOD PRESSURE: 205 MMHG

## 2021-09-01 DIAGNOSIS — I10 ASYMPTOMATIC HYPERTENSION: ICD-10-CM

## 2021-09-01 DIAGNOSIS — Z20.822 EXPOSURE TO COVID-19 VIRUS: Primary | ICD-10-CM

## 2021-09-01 LAB — SARS-COV-2, COV2: NORMAL

## 2021-09-01 PROCEDURE — U0005 INFEC AGEN DETEC AMPLI PROBE: HCPCS

## 2021-09-01 PROCEDURE — 99281 EMR DPT VST MAYX REQ PHY/QHP: CPT

## 2021-09-01 NOTE — ED NOTES
Discharge instructions reviewed with pt and copy given by this RN. Patient verbalized understanding to monitor asymptomatic HTN at home. Patient also verbalized understanding of reasons to return to ED for HTN.

## 2021-09-01 NOTE — ED PROVIDER NOTES
Patient is a 79year old female who presents to ED c/o exposure to COVID-19. States her son's girlfriends parent's were diagnosed with COVID-19 today. She was with them 2 days prior. She is currently asymptomatic. She denies any fever, chills, sore throat, nasal congestion, cough, shortness of breath, chest pain, palpitations, difficulty breathing, headache and syncope. She has received both doses of COVID-19 vaccination. Patient with history of HTN States she has been taking her medications as prescribed, but that she is very concerned she could have COVID so this is why her BP may be elevated. Denies any headache, chest pain, shortness of breath.             Past Medical History:   Diagnosis Date    Esophageal reflux 4/13/2010    Essential hypertension, benign 4/13/2010    Mixed hyperlipidemia 4/13/2010    Obesity, unspecified 4/13/2010    CINTHIA (obstructive sleep apnea) 08-    AHI: 5.6 per hour       Past Surgical History:   Procedure Laterality Date    HX GYN      HX TUBAL LIGATION      MO BREAST SURGERY PROCEDURE UNLISTED      breast biopsy         Family History:   Problem Relation Age of Onset    Cancer Mother         breast    Cancer Brother         leukemia    Cancer Sister        Social History     Socioeconomic History    Marital status:      Spouse name: Not on file    Number of children: Not on file    Years of education: Not on file    Highest education level: Not on file   Occupational History    Not on file   Tobacco Use    Smoking status: Never Smoker    Smokeless tobacco: Never Used   Vaping Use    Vaping Use: Never used   Substance and Sexual Activity    Alcohol use: No    Drug use: No    Sexual activity: Not Currently   Other Topics Concern     Service No    Blood Transfusions No    Caffeine Concern No     Comment: 2 cups of coffee daily    Occupational Exposure No    Hobby Hazards No    Sleep Concern No    Stress Concern No    Weight Concern Yes    Special Diet No    Back Care Yes    Exercise No    Bike Helmet No    Seat Belt Yes    Self-Exams No   Social History Narrative    Not on file     Social Determinants of Health     Financial Resource Strain:     Difficulty of Paying Living Expenses:    Food Insecurity:     Worried About Running Out of Food in the Last Year:     Ran Out of Food in the Last Year:    Transportation Needs:     Lack of Transportation (Medical):  Lack of Transportation (Non-Medical):    Physical Activity:     Days of Exercise per Week:     Minutes of Exercise per Session:    Stress:     Feeling of Stress :    Social Connections:     Frequency of Communication with Friends and Family:     Frequency of Social Gatherings with Friends and Family:     Attends Druze Services:     Active Member of Clubs or Organizations:     Attends Club or Organization Meetings:     Marital Status:    Intimate Partner Violence:     Fear of Current or Ex-Partner:     Emotionally Abused:     Physically Abused:     Sexually Abused: ALLERGIES: Nitrofurantoin monohyd/m-cryst, Codeine, Macrodantin [nitrofurantoin macrocrystalline], Tenoretic 100 [atenolol-chlorthalidone], and Zocor [simvastatin]    Review of Systems   Constitutional: Negative for activity change, appetite change, chills and fever. HENT: Negative for congestion and sore throat. Eyes: Negative for pain and visual disturbance. Respiratory: Negative for cough and shortness of breath. Cardiovascular: Negative for chest pain, palpitations and leg swelling. Gastrointestinal: Negative for abdominal distention, abdominal pain, constipation, diarrhea, nausea and vomiting. Genitourinary: Negative for decreased urine volume, dysuria, flank pain, frequency and urgency. Musculoskeletal: Negative for back pain and neck pain. Skin: Negative for rash and wound. Allergic/Immunologic: Negative for immunocompromised state.    Neurological: Negative for dizziness, syncope, weakness, light-headedness, numbness and headaches. Psychiatric/Behavioral: Negative for confusion. All other systems reviewed and are negative. Vitals:    09/01/21 1807   Pulse: 77   Resp: 16   Temp: 97.8 °F (36.6 °C)   SpO2: 99%   Weight: 97.5 kg (215 lb)   Height: 5' 4\" (1.626 m)            Physical Exam  Vitals and nursing note reviewed. Constitutional:       General: She is not in acute distress. Appearance: Normal appearance. She is well-developed. She is not toxic-appearing. HENT:      Head: Normocephalic and atraumatic. Nose: Nose normal.      Mouth/Throat:      Mouth: Mucous membranes are moist.      Pharynx: No oropharyngeal exudate or posterior oropharyngeal erythema. Eyes:      General: Lids are normal.      Extraocular Movements: Extraocular movements intact. Conjunctiva/sclera: Conjunctivae normal.   Cardiovascular:      Rate and Rhythm: Normal rate and regular rhythm. Pulses: Normal pulses. Heart sounds: Normal heart sounds, S1 normal and S2 normal.   Pulmonary:      Effort: Pulmonary effort is normal. No accessory muscle usage. Breath sounds: Normal breath sounds. Abdominal:      Palpations: Abdomen is soft. Musculoskeletal:         General: Normal range of motion. Cervical back: Normal range of motion and neck supple. Skin:     General: Skin is warm and dry. Capillary Refill: Capillary refill takes less than 2 seconds. Neurological:      General: No focal deficit present. Mental Status: She is alert and oriented to person, place, and time. Mental status is at baseline. Psychiatric:         Attention and Perception: Attention normal.         Mood and Affect: Mood and affect normal.         Speech: Speech normal.         Behavior: Behavior is cooperative. Thought Content:  Thought content normal.         Cognition and Memory: Cognition normal.         Judgment: Judgment normal.          MDM  Number of Diagnoses or Management Options     Amount and/or Complexity of Data Reviewed  Clinical lab tests: ordered  Discuss the patient with other providers: yes (Dr. Issa Sprague, ED Attending )           Procedures

## 2021-09-01 NOTE — ED TRIAGE NOTES
Pt reports she was exposed to someone who tested positive for COVID on Friday. Patient denies all complaints.

## 2021-09-02 ENCOUNTER — PATIENT OUTREACH (OUTPATIENT)
Dept: CASE MANAGEMENT | Age: 71
End: 2021-09-02

## 2021-09-02 LAB
SARS-COV-2, XPLCVT: NOT DETECTED
SOURCE, COVRS: NORMAL

## 2021-09-02 NOTE — PROGRESS NOTES
Educated patient about risk for severe COVID-19 due to risk factors according to CDC guidelines. ACM reviewed discharge instructions, medical action plan and red flag symptoms with the patient who verbalized understanding. Discussed COVID vaccination status: yes. Education provided on COVID-19 vaccination as appropriate. Discussed exposure protocols and quarantine with CDC Guidelines. Patient was given an opportunity to verbalize any questions and concerns and agrees to contact ACM or health care provider for questions related to their healthcare. Patient says she was exposed, but still no symptoms. Results are pending.  Education provided due to at risk condition, and review Discharge instructions

## 2021-10-18 ENCOUNTER — OFFICE VISIT (OUTPATIENT)
Dept: PRIMARY CARE CLINIC | Age: 71
End: 2021-10-18
Payer: MEDICARE

## 2021-10-18 VITALS
WEIGHT: 213.6 LBS | SYSTOLIC BLOOD PRESSURE: 162 MMHG | DIASTOLIC BLOOD PRESSURE: 96 MMHG | RESPIRATION RATE: 15 BRPM | HEIGHT: 64 IN | TEMPERATURE: 97.5 F | HEART RATE: 79 BPM | BODY MASS INDEX: 36.47 KG/M2 | OXYGEN SATURATION: 97 %

## 2021-10-18 DIAGNOSIS — I10 UNCONTROLLED HYPERTENSION: ICD-10-CM

## 2021-10-18 DIAGNOSIS — Z12.11 COLON CANCER SCREENING: ICD-10-CM

## 2021-10-18 DIAGNOSIS — E11.8 TYPE II DIABETES MELLITUS WITH COMPLICATION (HCC): ICD-10-CM

## 2021-10-18 DIAGNOSIS — G47.33 OSA (OBSTRUCTIVE SLEEP APNEA): ICD-10-CM

## 2021-10-18 DIAGNOSIS — Z00.00 MEDICARE ANNUAL WELLNESS VISIT, SUBSEQUENT: Primary | ICD-10-CM

## 2021-10-18 DIAGNOSIS — E66.01 SEVERE OBESITY (BMI 35.0-35.9 WITH COMORBIDITY) (HCC): ICD-10-CM

## 2021-10-18 DIAGNOSIS — Z23 ENCOUNTER FOR IMMUNIZATION: ICD-10-CM

## 2021-10-18 DIAGNOSIS — Z71.89 ACP (ADVANCE CARE PLANNING): ICD-10-CM

## 2021-10-18 PROBLEM — E11.9 TYPE 2 DIABETES MELLITUS (HCC): Status: RESOLVED | Noted: 2021-10-18 | Resolved: 2021-10-18

## 2021-10-18 PROBLEM — E11.9 TYPE 2 DIABETES MELLITUS (HCC): Status: ACTIVE | Noted: 2021-10-18

## 2021-10-18 LAB
ALBUMIN SERPL-MCNC: 4 G/DL (ref 3.5–5)
ALBUMIN/GLOB SERPL: 1.3 {RATIO} (ref 1.1–2.2)
ALP SERPL-CCNC: 76 U/L (ref 45–117)
ALT SERPL-CCNC: 36 U/L (ref 12–78)
ANION GAP SERPL CALC-SCNC: 3 MMOL/L (ref 5–15)
AST SERPL-CCNC: 19 U/L (ref 15–37)
BILIRUB SERPL-MCNC: 0.7 MG/DL (ref 0.2–1)
BUN SERPL-MCNC: 14 MG/DL (ref 6–20)
BUN/CREAT SERPL: 18 (ref 12–20)
CALCIUM SERPL-MCNC: 9.3 MG/DL (ref 8.5–10.1)
CHLORIDE SERPL-SCNC: 103 MMOL/L (ref 97–108)
CHOLEST SERPL-MCNC: 216 MG/DL
CO2 SERPL-SCNC: 31 MMOL/L (ref 21–32)
CREAT SERPL-MCNC: 0.78 MG/DL (ref 0.55–1.02)
CREAT UR-MCNC: 61.4 MG/DL
ERYTHROCYTE [DISTWIDTH] IN BLOOD BY AUTOMATED COUNT: 12.6 % (ref 11.5–14.5)
EST. AVERAGE GLUCOSE BLD GHB EST-MCNC: 137 MG/DL
GLOBULIN SER CALC-MCNC: 3.2 G/DL (ref 2–4)
GLUCOSE SERPL-MCNC: 128 MG/DL (ref 65–100)
HBA1C MFR BLD: 6.4 % (ref 4–5.6)
HCT VFR BLD AUTO: 43.2 % (ref 35–47)
HDLC SERPL-MCNC: 59 MG/DL
HDLC SERPL: 3.7 {RATIO} (ref 0–5)
HGB BLD-MCNC: 14.3 G/DL (ref 11.5–16)
LDLC SERPL CALC-MCNC: 118.2 MG/DL (ref 0–100)
MCH RBC QN AUTO: 31.6 PG (ref 26–34)
MCHC RBC AUTO-ENTMCNC: 33.1 G/DL (ref 30–36.5)
MCV RBC AUTO: 95.6 FL (ref 80–99)
MICROALBUMIN UR-MCNC: 2.32 MG/DL
MICROALBUMIN/CREAT UR-RTO: 38 MG/G (ref 0–30)
NRBC # BLD: 0 K/UL (ref 0–0.01)
NRBC BLD-RTO: 0 PER 100 WBC
PLATELET # BLD AUTO: 363 K/UL (ref 150–400)
PMV BLD AUTO: 10.9 FL (ref 8.9–12.9)
POTASSIUM SERPL-SCNC: 4.2 MMOL/L (ref 3.5–5.1)
PROT SERPL-MCNC: 7.2 G/DL (ref 6.4–8.2)
RBC # BLD AUTO: 4.52 M/UL (ref 3.8–5.2)
SODIUM SERPL-SCNC: 137 MMOL/L (ref 136–145)
TRIGL SERPL-MCNC: 194 MG/DL (ref ?–150)
VLDLC SERPL CALC-MCNC: 38.8 MG/DL
WBC # BLD AUTO: 5.7 K/UL (ref 3.6–11)

## 2021-10-18 PROCEDURE — 90694 VACC AIIV4 NO PRSRV 0.5ML IM: CPT | Performed by: INTERNAL MEDICINE

## 2021-10-18 PROCEDURE — 2022F DILAT RTA XM EVC RTNOPTHY: CPT | Performed by: INTERNAL MEDICINE

## 2021-10-18 PROCEDURE — 99214 OFFICE O/P EST MOD 30 MIN: CPT | Performed by: INTERNAL MEDICINE

## 2021-10-18 PROCEDURE — 1090F PRES/ABSN URINE INCON ASSESS: CPT | Performed by: INTERNAL MEDICINE

## 2021-10-18 PROCEDURE — G8399 PT W/DXA RESULTS DOCUMENT: HCPCS | Performed by: INTERNAL MEDICINE

## 2021-10-18 PROCEDURE — G0439 PPPS, SUBSEQ VISIT: HCPCS | Performed by: INTERNAL MEDICINE

## 2021-10-18 PROCEDURE — 3017F COLORECTAL CA SCREEN DOC REV: CPT | Performed by: INTERNAL MEDICINE

## 2021-10-18 PROCEDURE — G8753 SYS BP > OR = 140: HCPCS | Performed by: INTERNAL MEDICINE

## 2021-10-18 PROCEDURE — 1101F PT FALLS ASSESS-DOCD LE1/YR: CPT | Performed by: INTERNAL MEDICINE

## 2021-10-18 PROCEDURE — 3044F HG A1C LEVEL LT 7.0%: CPT | Performed by: INTERNAL MEDICINE

## 2021-10-18 PROCEDURE — G8427 DOCREV CUR MEDS BY ELIG CLIN: HCPCS | Performed by: INTERNAL MEDICINE

## 2021-10-18 PROCEDURE — G8417 CALC BMI ABV UP PARAM F/U: HCPCS | Performed by: INTERNAL MEDICINE

## 2021-10-18 PROCEDURE — G8510 SCR DEP NEG, NO PLAN REQD: HCPCS | Performed by: INTERNAL MEDICINE

## 2021-10-18 PROCEDURE — G8755 DIAS BP > OR = 90: HCPCS | Performed by: INTERNAL MEDICINE

## 2021-10-18 PROCEDURE — G8536 NO DOC ELDER MAL SCRN: HCPCS | Performed by: INTERNAL MEDICINE

## 2021-10-18 PROCEDURE — G0008 ADMIN INFLUENZA VIRUS VAC: HCPCS | Performed by: INTERNAL MEDICINE

## 2021-10-18 RX ORDER — AMLODIPINE BESYLATE 5 MG/1
5 TABLET ORAL DAILY
Qty: 90 TABLET | Refills: 0 | Status: SHIPPED | OUTPATIENT
Start: 2021-10-18 | End: 2022-01-09 | Stop reason: SDUPTHER

## 2021-10-18 NOTE — PROGRESS NOTES
Chief Complaint   Patient presents with   Bettie Clay Annual Wellness Visit     still doesn't have her CPAP from recall       Visit Vitals  BP (!) 168/96 (BP 1 Location: Right arm)   Pulse 79   Temp 97.5 °F (36.4 °C)   Resp 15   Ht 5' 4\" (1.626 m)   Wt 213 lb 9.6 oz (96.9 kg)   SpO2 97%   BMI 36.66 kg/m²       1. Have you been to the ER, urgent care clinic since your last visit? Hospitalized since your last visit? Yes [facility] for Covid testing, [facility]    2. Have you seen or consulted any other health care providers outside of the [address] [place] [address] since your last visit? Include any pap smears or colon screening. Yes Sleep Specialist at [address] [place] [address]  is a 79 y.o. female and presents for Annual Medicare Wellness Visit. Assessment of cognitive impairment: Alert and oriented x 4. Depression Screen:   3 most recent PHQ Screens 10/18/2021   Little interest or pleasure in doing things Not at all   Feeling down, depressed, irritable, or hopeless Not at all   Total Score PHQ 2 0       Fall Risk Assessment:    Fall Risk Assessment, last 12 mths 10/18/2021   Able to walk? Yes   Fall in past 12 months? 0   Do you feel unsteady? 0   Are you worried about falling 0   Number of falls in past 12 months -   Fall with injury? -       Abuse Screen:   Abuse Screening Questionnaire 10/18/2021   Do you ever feel afraid of your partner? N   Are you in a relationship with someone who physically or mentally threatens you? N   Is it safe for you to go home? Y       Activities of Daily Living:  Self-care.    Requires assistance with: no ADLs  Patient handle his/her own medications  yes Use of pill box  yes  Activities of Daily Living:   ADL Assessment 10/18/2021   Feeding yourself No Help Needed   Getting from bed to chair No Help Needed   Getting dressed No Help Needed   Bathing or showering No Help Needed   Walk across the room (includes cane/walker) No Help Needed   Using the telphone No Help Needed Taking your medications No Help Needed   Preparing meals No Help Needed   Managing money (expenses/bills) No Help Needed   Moderately strenuous housework (laundry) No Help Needed   Shopping for personal items (toiletries/medicines) No Help Needed   Shopping for groceries No Help Needed   Driving No Help Needed   Climbing a flight of stairs No Help Needed   Getting to places beyond walking distances No Help Needed       Health Maintenance:  Daily Aspirin: no  Bone Density: up to date , done by Gyn   Glaucoma Screening: no  Immunizations:    Tetanus: up to date. Influenza: up to date. Shingles: not up to date - declined due to the cost .  PPSV-23: up to date. Prevnar-13: up to date. Cancer screening:    Cervical: up to date done by OBGYN. Breast: up to date. Done last year at [address] [place] [address] office  Colon: not up to date - FIT test ordered       Alcohol Risk Screen:   On any occasion during the past 3 months, have you had more than 3 drinks(female) or 4 drinks (male) containing alcohol in one? No  Do you average more than 7 drinks (female) or 14 drinks (male) per week? No  Type and amount:doesn't drink    Hearing Loss:  Hearing is good. denies any hearing loss wears hearing aides    Vision Loss:   Wears glasses, contact lenses, or have any other visual impairment  yes    Adult Nutrition Screen:  No risk factors noted. Advance Care Planning:   End of Life Planning: has NO advanced directive -   - add't info provided  MelanieMatt Sequeiraeile[address] ACP-Facilitator appointment  Yes       Medications/Allergies: Reviewed with patient  Prior to Admission medications    Medication Sig Start Date End Date Taking? Authorizing Provider   metFORMIN (GLUCOPHAGE) 500 mg tablet Take 1 Tablet by mouth two (2) times daily (with meals) for 90 days. 8/30/21 11/28/21 Yes Sally Knowles MD   losartan-hydroCHLOROthiazide [facility]) 100-25 mg per tablet Take 1 Tablet by mouth daily for 90 days.  8/30/21 11/28/21 Yes Sally Knowles MD Cholecalciferol, Vitamin D3, (VITAMIN D) 1,000 unit Cap Take  by mouth. Yes Provider, Historical   VIT C/VITAMIN E ACETATE/CRANB (CRANBERRY CONC-ASCORBIC ACID PO) Take  by mouth. Yes Provider, Historical   multivitamin, stress formula (STRESS TAB) tablet Take 1 Tab by mouth daily. Yes Provider, Historical   CALCIUM PO Take 500 mg by mouth two (2) times a day. 6/21/11  Yes Provider, Historical   DOCOSAHEXANOIC ACID/EPA (FISH OIL PO) Take 1,000 mg by mouth daily. 2 tabs qd 6/21/11  Yes Provider, Historical   red yeast rice extract 600 mg cap Take 600 mg by mouth now. Patient not taking: Reported on 6/1/2021    Provider, Historical   FLAXSEED OIL PO Take  by mouth. Patient not taking: Reported on 10/18/2021    Provider, Historical       Allergies   Allergen Reactions    Nitrofurantoin Monohyd/M-Cryst Itching    Codeine Nausea Only     GI    Macrodantin [Nitrofurantoin Macrocrystalline] Itching    Tenoretic 100 [Atenolol-Chlorthalidone] Other (comments)     fatigue    Zocor [Simvastatin] Swelling       Objective:  Visit Vitals  BP (!) 168/96 (BP 1 Location: Right arm)   Pulse 79   Temp 97.5 °F (36.4 °C)   Resp 15   Ht 5' 4\" (1.626 m)   Wt 213 lb 9.6 oz (96.9 kg)   SpO2 97%   BMI 36.66 kg/m²    Body mass index is 36.66 kg/m². Problem List: Reviewed with patient and discussed risk factors.     Patient Active Problem List   Diagnosis Code    Essential hypertension, benign I10    Mixed hyperlipidemia E78.2    Esophageal reflux K21.9    Sleep apnea, obstructive G47.33       PSH: Reviewed with patient  Past Surgical History:   Procedure Laterality Date    HX GYN      HX TUBAL LIGATION      MA BREAST SURGERY PROCEDURE UNLISTED      breast biopsy        SH: Reviewed with patient  Social History     Tobacco Use    Smoking status: Never Smoker    Smokeless tobacco: Never Used   Vaping Use    Vaping Use: Never used   Substance Use Topics    Alcohol use: No    Drug use: No       FH: Reviewed with patient  Family History   Problem Relation Age of Onset    Cancer Mother         breast    Cancer Brother         leukemia    Cancer Sister        Current medical providers:    Patient Care Team:  Riya Early MD as PCP - General (Internal Medicine)  Riya Early [facility]  Ulysses Stevenson MD as Physician (Sleep Medicine)    Plan:    Diagnoses and all orders for this visit:    Medicare annual wellness visit, subsequent  . Age appropriate Health screening and immunization discussed with patient. ACP (advance care planning)  -     REFERRAL TO ACP CLINICAL SPECIALIST    Encounter for immunization  -     FLU (FLUAD QUAD INFLUENZA VACCINE,QUAD,ADJUVANTED)    Colon cancer screening  -     OCCULT BLOOD IMMUNOASSAY,DIAGNOSTIC; Future  -     OCCULT BLOOD IMMUNOASSAY,DIAGNOSTIC            Health Maintenance   Topic Date Due    Eye Exam Retinal or Dilated  Never done    Breast Cancer Screen Mammogram  2019    Flu Vaccine (1) 2021    Lipid Screen  2021    Foot Exam Q1  2021    A1C test (Diabetic or Prediabetic)  2021    MICROALBUMIN Q1  2021    Medicare Yearly Exam  2021    Shingrix Vaccine Age 50> (1 of 2) 2022 (Originally 2000)    DTaP/Tdap/Td series (2 - Td or Tdap) 2026    Colorectal Cancer Screening Combo  2027    Hepatitis C Screening  Completed    Bone Densitometry (Dexa) Screening  Completed    COVID-19 Vaccine  Completed    Pneumococcal 65+ years  Completed          Urinary/ Fecal Incontinence: No    Regular physical exercise: Not much lately as home is getting renovated. Patient verbalized understanding of information presented. AVS and Medicare Part B Preventive Services Table printed and given to pt and reviewed. See table for findings under Recommendation and Scheduled. All of the patient's questions were answered.   Gershon Sacks (: 1950) is a 79 y.o. female, established patient, here for evaluation of the following chief complaint(s): Annual Wellness Visit (still doesn't have her CPAP from recall)     Written by Vane Clifton, as dictated by Dr. Yolanda Gray MD.      ASSESSMENT/PLAN:  Below is the assessment and plan developed based on review of pertinent history, physical exam, labs, studies, and medications. 1. Type II diabetes mellitus with complication (HCC)  Continue taking metformin 500 mg BID as prescribed. Ordered labs to check lipid panel, A1c level, and kidney function. Waiting for results. Completed a diabetic foot exam in the office today. -     LIPID PANEL; Future  -     HEMOGLOBIN A1C WITH EAG; Future  -     MICROALBUMIN, UR, RAND W/ MICROALB/CREAT RATIO; Future  -      DIABETES FOOT EXAM    2. Uncontrolled hypertension  Continue taking losartan-HCTZ 100-25 mg as prescribed. Prescribed amlodipine 5 mg, take 1 tab daily as prescribed. Potential side effects were discussed. Ordered labs to check metabolic panel and CBC levels. Waiting for results. Encouraged her to check her BP at home and keep a log of her readings. Bring the log to the follow up appointment in a few weeks. -     METABOLIC PANEL, COMPREHENSIVE; Future  -     CBC W/O DIFF; Future  -     amLODIPine (NORVASC) 5 mg tablet; Take 1 Tablet by mouth daily for 90 days. , Normal, Disp-90 Tablet, R-0 sent to pharmacy. 3. Severe obesity (BMI 35.0-35.9 with comorbidity) ([address]  Advised pt to purchase a FitBit or similar device. Discussed that a healthy lifestyle requires 5,000-7,000 steps daily, but weight loss requires 10,000 steps daily. 4. CINTHIA (obstructive sleep apnea)  She is completing a sleep study tonight to work on receiving a new CPAP machine. SUBJECTIVE/OBJECTIVE:  HPI     Patient presents today for an annual wellness visit and for a follow up on a chronic condition. She takes losartan-HCTZ 100-25 mg for HTN. Her BP today is 168/96, 162/96 manual repeat.  She did take her medication before coming to her appointment. She thinks her BP is elevated secondary to her not sleeping well. She uses a CPAP nightly and her machine was recalled so she has not been using it. She is completing a sleep study tonight with a new doctor to try and get a new CPAP machine. She notes she has not been sleeping well, she wakes up fatigued, and wakes up with headaches. She takes metformin 500 mg BID for diabetes. She does not check her BS level at home. She notes her kitchen is being remodeled and she is unable to cook at home and watch her diet. She is followed by OBGYN. She completed a Pap smear last year and results were unremarkable. Patient Active Problem List   Diagnosis Code    Essential hypertension, benign I10    Mixed hyperlipidemia E78.2    Esophageal reflux K21.9    Sleep apnea, obstructive G47.33    Type II diabetes mellitus with complication (HCC) M90.8        Current Outpatient Medications on File Prior to Visit   Medication Sig Dispense Refill    metFORMIN (GLUCOPHAGE) 500 mg tablet Take 1 Tablet by mouth two (2) times daily (with meals) for 90 days. 180 Tablet 0    losartan-hydroCHLOROthiazide (HYZAAR) 100-25 mg per tablet Take 1 Tablet by mouth daily for 90 days. 90 Tablet 0    Cholecalciferol, Vitamin D3, (VITAMIN D) 1,000 unit Cap Take  by mouth.  VIT C/VITAMIN E ACETATE/CRANB (CRANBERRY CONC-ASCORBIC ACID PO) Take  by mouth.  multivitamin, stress formula (STRESS TAB) tablet Take 1 Tab by mouth daily.  CALCIUM PO Take 500 mg by mouth two (2) times a day.  DOCOSAHEXANOIC ACID/EPA (FISH OIL PO) Take 1,000 mg by mouth daily. 2 tabs qd      [DISCONTINUED] red yeast rice extract 600 mg cap Take 600 mg by mouth now. (Patient not taking: Reported on 6/1/2021)      FLAXSEED OIL PO Take  by mouth. (Patient not taking: Reported on 10/18/2021)       No current facility-administered medications on file prior to visit.        Allergies   Allergen Reactions    Nitrofurantoin Monohyd/M-Cryst Itching    Codeine Nausea Only     GI    Macrodantin [Nitrofurantoin Macrocrystalline] Itching    Tenoretic 100 [Atenolol-Chlorthalidone] Other (comments)     fatigue    Zocor [Simvastatin] Swelling       Past Medical History:   Diagnosis Date    Esophageal reflux 4/13/2010    Essential hypertension, benign 4/13/2010    Mixed hyperlipidemia 4/13/2010    Obesity, unspecified 4/13/2010    CINTHIA (obstructive sleep apnea) 08-    AHI: 5.6 per hour       Past Surgical History:   Procedure Laterality Date    HX GYN      HX TUBAL LIGATION      MI BREAST SURGERY PROCEDURE UNLISTED      breast biopsy       Family History   Problem Relation Age of Onset    Cancer Mother         breast    Cancer Brother         leukemia    Cancer Sister        Social History     Socioeconomic History    Marital status:      Spouse name: Not on file    Number of children: Not on file    Years of education: Not on file    Highest education level: Not on file   Occupational History    Not on file   Tobacco Use    Smoking status: Never Smoker    Smokeless tobacco: Never Used   Vaping Use    Vaping Use: Never used   Substance and Sexual Activity    Alcohol use: No    Drug use: No    Sexual activity: Not Currently   Other Topics Concern     Service No    Blood Transfusions No    Caffeine Concern No     Comment: 2 cups of coffee daily    Occupational Exposure No    Hobby Hazards No    Sleep Concern No    Stress Concern No    Weight Concern Yes    Special Diet No    Back Care Yes    Exercise No    Bike Helmet No    Seat Belt Yes    Self-Exams No   Social History Narrative    Not on file     Social Determinants of Health     Financial Resource Strain:     Difficulty of Paying Living Expenses:    Food Insecurity:     Worried About Running Out of Food in the Last Year:     Ran Out of Food in the Last Year:    Transportation Needs:     Lack of Transportation (Medical):  Lack of Transportation (Non-Medical):    Physical Activity:     Days of Exercise per Week:     Minutes of Exercise per Session:    Stress:     Feeling of Stress :    Social Connections:     Frequency of Communication with Friends and Family:     Frequency of Social Gatherings with Friends and Family:     Attends Mandaen Services:     Active Member of Clubs or Organizations:     Attends Club or Organization Meetings:     Marital Status:    Intimate Partner Violence:     Fear of Current or Ex-Partner:     Emotionally Abused:     Physically Abused:     Sexually Abused:        Admission on 09/01/2021, Discharged on 09/01/2021   Component Date Value Ref Range Status    SARS-CoV-2 09/01/2021 Please find results under separate order    Final    Specimen source 09/01/2021 Nasopharyngeal    Final    SARS-CoV-2 09/01/2021 Not detected  NOTD   Final    Comment:      The specimen is NEGATIVE for SARS-CoV-2, the novel coronavirus associated with COVID-19. A negative result does not rule out COVID-19. Theo SARS-CoV-2 for use on the Theo 6800/8800 [employer] is a real-time RT-PCR test intended for the qualitative detection of nucleic acids from SARS-CoV-2  in clinician-collected nasal, nasopharyngeal,and oropharyngeal swab specimens from individuals who meet COVID-19 clinical and/or epidemiological criteria. Theo SARS-CoV-2 is for use only under Emergency Use Authorization (EUA) in laboratories certified under [address] (CLIA), 42 [place]C. 655, that meet requirements to perform high or moderate complexity tests. An individual without symptoms of COVID-19 and who is not shedding SARS-CoV-2 virus would expect to have a negative (not detected) result in this assay.   Fact sheet for Healthcare Providers: ConventionUpdate.co.nz  Fact sheet for Patients: http://www.herring.[employer]/                           06773/download Methodology: RT-PCR       Review of Systems   Constitutional: Positive for fatigue. Negative for activity change and unexpected weight change. HENT: Negative for congestion, hearing loss, rhinorrhea and sore throat. Eyes: Negative for discharge. Respiratory: Negative for cough, chest tightness and shortness of breath. Cardiovascular: Negative for leg swelling. Gastrointestinal: Negative for abdominal pain, constipation and diarrhea. Genitourinary: Negative for dysuria, flank pain, frequency and urgency. Musculoskeletal: Negative for arthralgias, back pain and myalgias. Skin: Negative for color change and rash. Neurological: Positive for headaches. Negative for dizziness and light-headedness. Psychiatric/Behavioral: Positive for sleep disturbance. Negative for dysphoric mood. The patient is not nervous/anxious. Visit Vitals  BP (!) 162/96 (BP 1 Location: Left arm)   Pulse 79   Temp 97.5 °F (36.4 °C)   Resp 15   Ht 5' 4\" (1.626 m)   Wt 213 lb 9.6 oz (96.9 kg)   SpO2 97%   BMI 36.66 kg/m²       Physical Exam  Vitals and nursing note reviewed. Constitutional:       General: She is not in acute distress. Appearance: Normal appearance. She is well-developed. She is not diaphoretic. HENT:      Right Ear: External ear normal.      Left Ear: External ear normal.   Eyes:      General: No scleral icterus. Right eye: No discharge. Left eye: No discharge. Extraocular Movements: Extraocular movements intact. Conjunctiva/sclera: Conjunctivae normal.   Cardiovascular:      Rate and Rhythm: Normal rate and regular rhythm. Pulmonary:      Effort: Pulmonary effort is normal.      Breath sounds: Normal breath sounds. No wheezing. Abdominal:      General: Bowel sounds are normal.      Palpations: Abdomen is soft. Tenderness: There is no abdominal tenderness. Musculoskeletal:      Cervical back: Normal range of motion and neck supple.    Feet:      Comments: Diabetic foot exam:     Left: Vibratory sensation normal    Sharp/dull discrimination normal    Filament test normal sensation with micro filament   Pulse DP: 2+ (normal)   Deformities: None  Right: Vibratory sensation normal   Sharp/dull discrimination normal   Filament test normal sensation with micro filament   Pulse DP: 2+ (normal)   Deformities: None    Lymphadenopathy:      Cervical: No cervical adenopathy. Neurological:      Mental Status: She is alert and oriented to person, place, and time. Psychiatric:         Mood and Affect: Mood and affect normal.       An electronic signature was used to authenticate this note.   -- Cayetano Castleman

## 2021-10-18 NOTE — PROGRESS NOTES
Sue Quintana (: 1950) is a 79 y.o. female, established patient, here for evaluation of the following chief complaint(s): Annual Wellness Visit (still doesn't have her CPAP from recall)     Written by Leonid Mishra, as dictated by Dr. Miranda Hassan MD.      ASSESSMENT/PLAN:  Below is the assessment and plan developed based on review of pertinent history, physical exam, labs, studies, and medications. 1. Type II diabetes mellitus with complication (HCC)  Continue taking metformin 500 mg BID as prescribed. Ordered labs to check lipid panel, A1c level, and kidney function. Waiting for results. Completed a diabetic foot exam in the office today. -     LIPID PANEL; Future  -     HEMOGLOBIN A1C WITH EAG; Future  -     MICROALBUMIN, UR, RAND W/ MICROALB/CREAT RATIO; Future  -     HM DIABETES FOOT EXAM    2. Uncontrolled hypertension  Continue taking losartan-HCTZ 100-25 mg as prescribed. Prescribed amlodipine 5 mg, take 1 tab daily as prescribed. Potential side effects were discussed. Ordered labs to check metabolic panel and CBC levels. Waiting for results. Encouraged her to check her BP at home and keep a log of her readings. Bring the log to the follow up appointment in a few weeks. -     METABOLIC PANEL, COMPREHENSIVE; Future  -     CBC W/O DIFF; Future  -     amLODIPine (NORVASC) 5 mg tablet; Take 1 Tablet by mouth daily for 90 days. , Normal, Disp-90 Tablet, R-0 sent to pharmacy. 3. Severe obesity (BMI 35.0-35.9 with comorbidity) ([facility] [address]  Advised pt to purchase a FitBit or similar device. Discussed that a healthy lifestyle requires 5,000-7,000 steps daily, but weight loss requires 10,000 steps daily. 4. CINTHIA (obstructive sleep apnea)  She is completing a sleep study tonight to work on receiving a new CPAP machine. SUBJECTIVE/OBJECTIVE:  HPI     Patient presents today for an annual wellness visit and for a follow up on a chronic condition.  She takes losartan-HCTZ 100-25 mg for HTN. Her BP today is 168/96, 162/96 manual repeat. She did take her medication before coming to her appointment. She thinks her BP is elevated secondary to her not sleeping well. She uses a CPAP nightly and her machine was recalled so she has not been using it. She is completing a sleep study tonight with a new doctor to try and get a new CPAP machine. She notes she has not been sleeping well, she wakes up fatigued, and wakes up with headaches. She takes metformin 500 mg BID for diabetes. She does not check her BS level at home. She notes her kitchen is being remodeled and she is unable to cook at home and watch her diet. She is followed by OBGYN. She completed a Pap smear last year and results were unremarkable. Patient Active Problem List   Diagnosis Code    Essential hypertension, benign I10    Mixed hyperlipidemia E78.2    Esophageal reflux K21.9    Sleep apnea, obstructive G47.33    Type II diabetes mellitus with complication (HCC) C79.5        Current Outpatient Medications on File Prior to Visit   Medication Sig Dispense Refill    metFORMIN (GLUCOPHAGE) 500 mg tablet Take 1 Tablet by mouth two (2) times daily (with meals) for 90 days. 180 Tablet 0    losartan-hydroCHLOROthiazide (HYZAAR) 100-25 mg per tablet Take 1 Tablet by mouth daily for 90 days. 90 Tablet 0    Cholecalciferol, Vitamin D3, (VITAMIN D) 1,000 unit Cap Take  by mouth.  VIT C/VITAMIN E ACETATE/CRANB (CRANBERRY CONC-ASCORBIC ACID PO) Take  by mouth.  multivitamin, stress formula (STRESS TAB) tablet Take 1 Tab by mouth daily.  CALCIUM PO Take 500 mg by mouth two (2) times a day.  DOCOSAHEXANOIC ACID/EPA (FISH OIL PO) Take 1,000 mg by mouth daily. 2 tabs qd      [DISCONTINUED] red yeast rice extract 600 mg cap Take 600 mg by mouth now. (Patient not taking: Reported on 6/1/2021)      FLAXSEED OIL PO Take  by mouth.  (Patient not taking: Reported on 10/18/2021)       No current facility-administered medications on file prior to visit.        Allergies   Allergen Reactions    Nitrofurantoin Monohyd/M-Cryst Itching    Codeine Nausea Only     GI    Macrodantin [Nitrofurantoin Macrocrystalline] Itching    Tenoretic 100 [Atenolol-Chlorthalidone] Other (comments)     fatigue    Zocor [Simvastatin] Swelling       Past Medical History:   Diagnosis Date    Esophageal reflux 4/13/2010    Essential hypertension, benign 4/13/2010    Mixed hyperlipidemia 4/13/2010    Obesity, unspecified 4/13/2010    CINTHIA (obstructive sleep apnea) 08-    AHI: 5.6 per hour       Past Surgical History:   Procedure Laterality Date    HX GYN      HX TUBAL LIGATION      RI BREAST SURGERY PROCEDURE UNLISTED      breast biopsy       Family History   Problem Relation Age of Onset    Cancer Mother         breast    Cancer Brother         leukemia    Cancer Sister        Social History     Socioeconomic History    Marital status:      Spouse name: Not on file    Number of children: Not on file    Years of education: Not on file    Highest education level: Not on file   Occupational History    Not on file   Tobacco Use    Smoking status: Never Smoker    Smokeless tobacco: Never Used   Vaping Use    Vaping Use: Never used   Substance and Sexual Activity    Alcohol use: No    Drug use: No    Sexual activity: Not Currently   Other Topics Concern     Service No    Blood Transfusions No    Caffeine Concern No     Comment: 2 cups of coffee daily    Occupational Exposure No    Hobby Hazards No    Sleep Concern No    Stress Concern No    Weight Concern Yes    Special Diet No    Back Care Yes    Exercise No    Bike Helmet No    Seat Belt Yes    Self-Exams No   Social History Narrative    Not on file     Social Determinants of Health     Financial Resource Strain:     Difficulty of Paying Living Expenses:    Food Insecurity:     Worried About Running Out of Food in the Last Year:     Joie tierney Food in the Last Year:    Transportation Needs:     Lack of Transportation (Medical):  Lack of Transportation (Non-Medical):    Physical Activity:     Days of Exercise per Week:     Minutes of Exercise per Session:    Stress:     Feeling of Stress :    Social Connections:     Frequency of Communication with Friends and Family:     Frequency of Social Gatherings with Friends and Family:     Attends Nondenominational Services:     Active Member of Clubs or Organizations:     Attends Club or Organization Meetings:     Marital Status:    Intimate Partner Violence:     Fear of Current or Ex-Partner:     Emotionally Abused:     Physically Abused:     Sexually Abused:        Admission on 09/01/2021, Discharged on 09/01/2021   Component Date Value Ref Range Status    SARS-CoV-2 09/01/2021 Please find results under separate order    Final    Specimen source 09/01/2021 Nasopharyngeal    Final    SARS-CoV-2 09/01/2021 Not detected  NOTD   Final    Comment:      The specimen is NEGATIVE for SARS-CoV-2, the novel coronavirus associated with COVID-19. A negative result does not rule out COVID-19. Theo SARS-CoV-2 for use on the Theo 6800/8800 [employer] is a real-time RT-PCR test intended for the qualitative detection of nucleic acids from SARS-CoV-2  in clinician-collected nasal, nasopharyngeal,and oropharyngeal swab specimens from individuals who meet COVID-19 clinical and/or epidemiological criteria. Theo SARS-CoV-2 is for use only under Emergency Use Authorization (EUA) in laboratories certified under [address] (CLIA), [place] 344I, that meet requirements to perform high or moderate complexity tests. An individual without symptoms of COVID-19 and who is not shedding SARS-CoV-2 virus would expect to have a negative (not detected) result in this assay.   Fact sheet for Healthcare Providers: ConventionUpdate.co.nz  Fact sheet for Patients: http://[facility]/                           [address]/download       Methodology: RT-PCR       Review of Systems   Constitutional: Positive for fatigue. Negative for activity change and unexpected weight change. HENT: Negative for congestion, hearing loss, rhinorrhea and sore throat. Eyes: Negative for discharge. Respiratory: Negative for cough, chest tightness and shortness of breath. Cardiovascular: Negative for leg swelling. Gastrointestinal: Negative for abdominal pain, constipation and diarrhea. Genitourinary: Negative for dysuria, flank pain, frequency and urgency. Musculoskeletal: Negative for arthralgias, back pain and myalgias. Skin: Negative for color change and rash. Neurological: Positive for headaches. Negative for dizziness and light-headedness. Psychiatric/Behavioral: Positive for sleep disturbance. Negative for dysphoric mood. The patient is not nervous/anxious. Visit Vitals  BP (!) 162/96 (BP 1 Location: Left arm)   Pulse 79   Temp 97.5 °F (36.4 °C)   Resp 15   Ht 5' 4\" (1.626 m)   Wt 213 lb 9.6 oz (96.9 kg)   SpO2 97%   BMI 36.66 kg/m²       Physical Exam  Vitals and nursing note reviewed. Constitutional:       General: She is not in acute distress. Appearance: Normal appearance. She is well-developed. She is not diaphoretic. HENT:      Right Ear: External ear normal.      Left Ear: External ear normal.   Eyes:      General: No scleral icterus. Right eye: No discharge. Left eye: No discharge. Extraocular Movements: Extraocular movements intact. Conjunctiva/sclera: Conjunctivae normal.   Cardiovascular:      Rate and Rhythm: Normal rate and regular rhythm. Pulmonary:      Effort: Pulmonary effort is normal.      Breath sounds: Normal breath sounds. No wheezing. Abdominal:      General: Bowel sounds are normal.      Palpations: Abdomen is soft. Tenderness: There is no abdominal tenderness.    Musculoskeletal:      Cervical back: Normal range of motion and neck supple. Feet:      Comments: Diabetic foot exam:     Left: Vibratory sensation normal    Sharp/dull discrimination normal    Filament test normal sensation with micro filament   Pulse DP: 2+ (normal)   Deformities: None  Right: Vibratory sensation normal   Sharp/dull discrimination normal   Filament test normal sensation with micro filament   Pulse DP: 2+ (normal)   Deformities: None    Lymphadenopathy:      Cervical: No cervical adenopathy. Neurological:      Mental Status: She is alert and oriented to person, place, and time. Psychiatric:         Mood and Affect: Mood and affect normal.             An electronic signature was used to authenticate this note.   -- Dennis Albarran

## 2021-10-20 ENCOUNTER — DOCUMENTATION ONLY (OUTPATIENT)
Dept: FAMILY MEDICINE CLINIC | Facility: CLINIC | Age: 71
End: 2021-10-20

## 2021-10-20 NOTE — PROGRESS NOTES
Results reviewed. Release via barcoo, it was nice seeing you at your recent visit. Your blood report is back and as expected your diabetes and cholesterol numbers came back high. How are your blood pressure readings? I hope your home renovation has completed and you are on healthy diet now. Please make a Blood pressure follow up appointment.

## 2021-10-20 NOTE — ACP (ADVANCE CARE PLANNING)
Advance Care Planning   Ambulatory ACP Specialist Patient Outreach    Date:  10/20/2021    ACP Specialist:  Bakari Maldonado RN    Outreach call to patient in follow-up to ACP Specialist referral from:    [x] PCP  [] Provider   [] Ambulatory Care Management [] Other     For:                  [x] Advance Directive Assistance              [] Complete Portable DNR order              [] Complete POST/MOST              [] Code Status Discussion             [] Discuss Goals of Care             [] Early ACP Decision-Making              [] Other (Specify)    Date Referral Received: 10/19/21    Today's Outreach:  [x] First   [] Second  [] Third       Third outreach made by: [] Phone  [] Email / mail    [] [employer]     Intervention:  [] Spoke with Patient   [x] Left VM requesting return call      Next Step:   [] ACP scheduled conversation  [x] Outreach again in one week               [] Email / Mail [address]  [] Email / Mail Advance Directive   [] Closing referral.  Routing closure to referring provider/staff and to ACP Specialist . [] Closure letter mailed to patient with invitation to contact ACP Specialist if / when ready.   Thank you for this referral.

## 2021-10-27 ENCOUNTER — DOCUMENTATION ONLY (OUTPATIENT)
Dept: FAMILY MEDICINE CLINIC | Facility: CLINIC | Age: 71
End: 2021-10-27

## 2021-10-27 NOTE — ACP (ADVANCE CARE PLANNING)
Advance Care Planning   Ambulatory ACP Specialist Patient Outreach    Date:  10/27/2021    ACP Specialist:  Quincy Mclaughlin RN    Outreach call to patient in follow-up to ACP Specialist referral from:    [x] PCP  [] Provider   [] Ambulatory Care Management [] Other     For:                  [x] Advance Directive Assistance              [] Complete Portable DNR order              [] Complete POST/MOST              [] Code Status Discussion             [] Discuss Goals of Care             [] Early ACP Decision-Making              [] Other (Specify)    Date Referral Received: 10/19/21    Today's Outreach:  [] First   [x] Second  [] Third       Third outreach made by: [] Phone  [] Email / mail    [] [employer]     Intervention:  [x] Spoke with Patient   [] Left VM requesting return call      Outcome: Patient would like ACP literature to review. Pt states her home is curretly undergoing renovations, so she does not have time to discuss ACP. She approved a callback in one month to check if she is ready to proceed. Next Step:   [] ACP scheduled conversation  [x] Outreach again in one month               [x] Email ACP Info Sheets  [] Email / Mail Advance Directive   [] Closing referral.  Routing closure to referring provider/staff and to ACP Specialist . [] Closure letter mailed to patient with invitation to contact ACP Specialist if / when ready.   Thank you for this referral.

## 2021-11-29 ENCOUNTER — DOCUMENTATION ONLY (OUTPATIENT)
Dept: FAMILY MEDICINE CLINIC | Facility: CLINIC | Age: 71
End: 2021-11-29

## 2021-11-29 NOTE — ACP (ADVANCE CARE PLANNING)
Advance Care Planning   Ambulatory ACP Specialist Patient Outreach    Date:  11/29/2021    ACP Specialist:  Larissa Jang RN    Outreach call to patient in follow-up to ACP Specialist referral from:    [x] PCP  [] Provider   [] Ambulatory Care Management [] Other     For:                  [x] Advance Directive Assistance              [] Complete Portable DNR order              [] Complete POST/MOST              [] Code Status Discussion             [] Discuss Goals of Care             [] Early ACP Decision-Making              [] Other (Specify)    Date Referral Received: 10/19/21    Today's Outreach:  [] First   [] Second  [x] Third       Third outreach made by: [x] Phone  [] Email / mail    [] [employer]     Intervention:  [x] Spoke with Patient   [] Left VM requesting return call      Outcome: ACP conversation scheduled for 12/7/21 at 1100. Next Step:   [x] ACP scheduled conversation  [] Outreach again in one week               [] Email / Mail ACP Info Sheets  [] Email / Mail Advance Directive   [] Closing referral.  Routing closure to referring provider/staff and to ACP Specialist . [] Closure letter mailed to patient with invitation to contact ACP Specialist if / when ready.   Thank you for this referral.

## 2021-12-02 ENCOUNTER — TELEPHONE (OUTPATIENT)
Dept: PRIMARY CARE CLINIC | Age: 71
End: 2021-12-02

## 2021-12-02 DIAGNOSIS — E11.9 CONTROLLED TYPE 2 DIABETES MELLITUS WITHOUT COMPLICATION, WITHOUT LONG-TERM CURRENT USE OF INSULIN (HCC): ICD-10-CM

## 2021-12-02 RX ORDER — LOSARTAN POTASSIUM AND HYDROCHLOROTHIAZIDE 25; 100 MG/1; MG/1
TABLET ORAL
Qty: 90 TABLET | Refills: 0 | Status: SHIPPED | OUTPATIENT
Start: 2021-12-02 | End: 2022-02-27 | Stop reason: SDUPTHER

## 2021-12-02 RX ORDER — METFORMIN HYDROCHLORIDE 500 MG/1
TABLET ORAL
Qty: 180 TABLET | Refills: 0 | Status: SHIPPED | OUTPATIENT
Start: 2021-12-02 | End: 2022-02-27 | Stop reason: SDUPTHER

## 2021-12-02 NOTE — TELEPHONE ENCOUNTER
----- Message from Tootie Cardona sent at 12/2/2021 10:17 AM EST -----  Subject: Refill Request    QUESTIONS  Name of Medication? metFORMIN (GLUCOPHAGE) 500 mg tablet  Patient-reported dosage and instructions? 500 MG 2 x a day  How many days do you have left? 7  Preferred Pharmacy? [id number]  Pharmacy phone number (if available)? [phone]  Additional Information for Provider? [facility] may call as well She does have   an appt on 12-13 @11:30  ---------------------------------------------------------------------------  --------------,  Name of Medication? losartan-hydroCHLOROthiazide (HYZAAR) 100-25 mg per   tablet  Patient-reported dosage and instructions? 100-25 MG 1 x a day  How many days do you have left? 0  Preferred Pharmacy? [id number]  Pharmacy phone number (if available)? [phone]  Additional Information for Provider? Ellie will be calling as well she   has an appt on 12/13 @ 11:30 for med check  ---------------------------------------------------------------------------  --------------  CALL BACK INFO  What is the best way for the office to contact you? OK to leave message on   voicemail  Preferred Call Back Phone Number?  [phone]

## 2021-12-07 ENCOUNTER — DOCUMENTATION ONLY (OUTPATIENT)
Dept: FAMILY MEDICINE CLINIC | Facility: CLINIC | Age: 71
End: 2021-12-07

## 2021-12-07 NOTE — ACP (ADVANCE CARE PLANNING)
Advance Care Planning     Advance Care Planning Clinical Specialist  Conversation Note      Date of ACP Conversation: 12/07/21    Conversation Conducted with:  Patient with Decision Making Capacity    ACP Clinical Specialist: Mihaela Gamble RN    Health Care Decision Maker:    Current Designated Health Care Decision Maker:     Primary Decision Maker: El Becker - [phone]    Today we documented Decision Maker(s) consistent with ACP documents on file. Care Preferences    Hospitalization: \"If your health worsens and it becomes clear that your chance of recovery is unlikely, what would your preference be regarding hospitalization? \"    Choice:  []  The patient wants hospitalization  []  The patient prefers comfort-focused treatment without hospitalization. [x]  Patient undecided; \"Up to the discretion of my agent. \"    Ventilation: \"If you were in your present state of health and suddenly became very ill and were unable to breathe on your own, what would your preference be about the use of a ventilator (breathing machine) if it were available to you? \"      If patient would desire the use of a ventilator (breathing machine), answer \"yes\", if not \"no\": Yes    \"If your health worsens and it becomes clear that your chance of recovery is unlikely, what would your preference be about the use of a ventilator (breathing machine) if it were available to you? \"     Would the patient desire the use of a ventilator (breathing machine)? No      Resuscitation  \"CPR works best to restart the heart when there is a sudden event, like a heart attack, in someone who is otherwise healthy. Unfortunately, CPR does not typically restart the heart for people who have serious health conditions or who are very sick. \"    \"In the event your heart stopped as a result of an underlying serious health condition, would you want attempts to be made to restart your heart (answer \"yes\" for attempt to resuscitate) or would you prefer a natural death (answer \"no\" for do not attempt to resuscitate)? \" No      [x] Yes  [] No   Educated Patient / Tee Esteban regarding differences between Advance Directives and portable DNR orders. Length of ACP Conversation in minutes:  45    Conversation Outcomes:  [x] ACP discussion completed  [] Existing advance directive reviewed with patient; no changes to patient's previously recorded wishes   [x] New Advance Directive completed and signed using D[employer]ign   [] Portable Do Not Resuscitate prepared for Provider review and signature  [] POLST/POST/MOLST/MOST prepared for Provider review and signature    Mailed 2 copies completed AMD to patient.     Follow-up plan:    [] Schedule follow-up conversation to continue planning  [] Referred individual to Provider for additional questions/concerns   [x] Advised patient/agent/surrogate to review completed ACP document and update if needed with changes in condition, patient preferences or care setting     [x] This note routed to one or more involved healthcare providers

## 2021-12-13 ENCOUNTER — OFFICE VISIT (OUTPATIENT)
Dept: PRIMARY CARE CLINIC | Age: 71
End: 2021-12-13
Payer: MEDICARE

## 2021-12-13 VITALS
TEMPERATURE: 97.1 F | RESPIRATION RATE: 18 BRPM | WEIGHT: 214 LBS | BODY MASS INDEX: 36.54 KG/M2 | DIASTOLIC BLOOD PRESSURE: 84 MMHG | OXYGEN SATURATION: 99 % | HEART RATE: 73 BPM | HEIGHT: 64 IN | SYSTOLIC BLOOD PRESSURE: 132 MMHG

## 2021-12-13 DIAGNOSIS — I10 PRIMARY HYPERTENSION: ICD-10-CM

## 2021-12-13 DIAGNOSIS — E78.2 MIXED HYPERLIPIDEMIA: ICD-10-CM

## 2021-12-13 DIAGNOSIS — Z99.89 OSA ON CPAP: ICD-10-CM

## 2021-12-13 DIAGNOSIS — E11.8 TYPE II DIABETES MELLITUS WITH COMPLICATION (HCC): Primary | ICD-10-CM

## 2021-12-13 DIAGNOSIS — E66.9 OBESITY (BMI 30-39.9): ICD-10-CM

## 2021-12-13 DIAGNOSIS — G47.33 OSA ON CPAP: ICD-10-CM

## 2021-12-13 PROCEDURE — 1101F PT FALLS ASSESS-DOCD LE1/YR: CPT | Performed by: INTERNAL MEDICINE

## 2021-12-13 PROCEDURE — 3044F HG A1C LEVEL LT 7.0%: CPT | Performed by: INTERNAL MEDICINE

## 2021-12-13 PROCEDURE — G9899 SCRN MAM PERF RSLTS DOC: HCPCS | Performed by: INTERNAL MEDICINE

## 2021-12-13 PROCEDURE — G8510 SCR DEP NEG, NO PLAN REQD: HCPCS | Performed by: INTERNAL MEDICINE

## 2021-12-13 PROCEDURE — G8536 NO DOC ELDER MAL SCRN: HCPCS | Performed by: INTERNAL MEDICINE

## 2021-12-13 PROCEDURE — G8754 DIAS BP LESS 90: HCPCS | Performed by: INTERNAL MEDICINE

## 2021-12-13 PROCEDURE — 1090F PRES/ABSN URINE INCON ASSESS: CPT | Performed by: INTERNAL MEDICINE

## 2021-12-13 PROCEDURE — G8752 SYS BP LESS 140: HCPCS | Performed by: INTERNAL MEDICINE

## 2021-12-13 PROCEDURE — 2022F DILAT RTA XM EVC RTNOPTHY: CPT | Performed by: INTERNAL MEDICINE

## 2021-12-13 PROCEDURE — G8427 DOCREV CUR MEDS BY ELIG CLIN: HCPCS | Performed by: INTERNAL MEDICINE

## 2021-12-13 PROCEDURE — 3017F COLORECTAL CA SCREEN DOC REV: CPT | Performed by: INTERNAL MEDICINE

## 2021-12-13 PROCEDURE — G8399 PT W/DXA RESULTS DOCUMENT: HCPCS | Performed by: INTERNAL MEDICINE

## 2021-12-13 PROCEDURE — G8417 CALC BMI ABV UP PARAM F/U: HCPCS | Performed by: INTERNAL MEDICINE

## 2021-12-13 PROCEDURE — 99214 OFFICE O/P EST MOD 30 MIN: CPT | Performed by: INTERNAL MEDICINE

## 2021-12-13 RX ORDER — EZETIMIBE 10 MG/1
10 TABLET ORAL DAILY
Qty: 90 TABLET | Refills: 0 | Status: SHIPPED | OUTPATIENT
Start: 2021-12-13 | End: 2022-02-27 | Stop reason: SDUPTHER

## 2021-12-13 NOTE — PROGRESS NOTES
Tj Suarez (: 1950) is a 70 y.o. female, established patient, here for evaluation of the following chief complaint(s):  Diabetes Care Management        ASSESSMENT/PLAN:  Below is the assessment and plan developed based on review of pertinent history, physical exam, labs, studies, and medications. 1. Type II diabetes mellitus with complication ([address]   She will come back for the blood work. We will continue the same regimen for now. Explained to her exercise and weight loss should improve her numbers.  -     METABOLIC PANEL, COMPREHENSIVE; Future  -     HEMOGLOBIN A1C WITH EAG; Future  2. Primary hypertension  Well-controlled on current regimen. 3. CINTHIA on CPAP  Sleeping well with the CPAP and feeling much better in the morning. 4. Mixed hyperlipidemia  We will start nonstatin medications until she loses weight.  -     ezetimibe (ZETIA) 10 mg tablet; Take 1 Tablet by mouth daily for 90 days. , Normal, Disp-90 Tablet, R-0  -     LIPID PANEL; Future  5. Obesity (BMI 30-39. 9)  She will start cooking more home meals and will start working on exercise to lose weight. SUBJECTIVE/OBJECTIVE:    Patient seen today for follow-up. She just got her CPAP about 2 weeks ago and has been feeling much better since last week. She is sleeping soundly at night and gets up fresh in the morning. She thinks now she can do more physical activity and should be able to lose weight. Her blood pressure readings have improved since last seen in the office. She just finished her kitchen renovation and has started cooking at home and she is hoping her blood sugar readings will improve as well. She will make an eye appointment for next month. Review of Systems   Constitutional: Negative for activity change, fatigue and unexpected weight change. HENT: Negative for congestion, hearing loss, rhinorrhea and sore throat. Eyes: Negative for discharge.    Respiratory: Negative for cough, chest tightness and shortness of breath. Cardiovascular: Negative for leg swelling. Gastrointestinal: Negative for abdominal pain, constipation and diarrhea. Genitourinary: Negative for dysuria, flank pain, frequency and urgency. Musculoskeletal: Negative for arthralgias, back pain and myalgias. Skin: Negative for color change and rash. Neurological: Negative for dizziness, light-headedness and headaches. Psychiatric/Behavioral: Negative for dysphoric mood and sleep disturbance. The patient is not nervous/anxious. Visit Vitals  /84 (BP 1 Location: Left upper arm, BP Patient Position: Sitting)   Pulse 73   Temp 97.1 °F (36.2 °C) (Temporal)   Resp 18   Ht 5' 4\" (1.626 m)   Wt 214 lb (97.1 kg)   SpO2 99%   BMI 36.73 kg/m²       Physical Exam  Vitals and nursing note reviewed. Constitutional:       Appearance: Normal appearance. She is obese. Eyes:      Extraocular Movements: Extraocular movements intact. Pupils: Pupils are equal, round, and reactive to light. Cardiovascular:      Rate and Rhythm: Normal rate and regular rhythm. Pulmonary:      Effort: Pulmonary effort is normal.      Breath sounds: Normal breath sounds. Abdominal:      General: Bowel sounds are normal. There is no distension. Palpations: Abdomen is soft. Musculoskeletal:         General: No swelling. Normal range of motion. Cervical back: Normal range of motion and neck supple. Right lower leg: No edema. Left lower leg: No edema. Neurological:      General: No focal deficit present. Mental Status: She is alert and oriented to person, place, and time. Motor: No weakness. Psychiatric:         Mood and Affect: Mood normal.         Behavior: Behavior normal.             An electronic signature was used to authenticate this note.   -- Som Fermin MD

## 2022-01-09 DIAGNOSIS — I10 UNCONTROLLED HYPERTENSION: ICD-10-CM

## 2022-01-10 RX ORDER — AMLODIPINE BESYLATE 5 MG/1
5 TABLET ORAL DAILY
Qty: 90 TABLET | Refills: 0 | Status: SHIPPED | OUTPATIENT
Start: 2022-01-10 | End: 2022-04-03 | Stop reason: SDUPTHER

## 2022-01-10 NOTE — TELEPHONE ENCOUNTER
Requested Prescriptions     Pending Prescriptions Disp Refills    amLODIPine (NORVASC) 5 mg tablet 90 Tablet 0     Sig: Take 1 Tablet by mouth daily for 90 days.         Last Visit 12/13/21  Last Refill 10/18/21

## 2022-02-27 DIAGNOSIS — E11.9 CONTROLLED TYPE 2 DIABETES MELLITUS WITHOUT COMPLICATION, WITHOUT LONG-TERM CURRENT USE OF INSULIN (HCC): ICD-10-CM

## 2022-02-27 DIAGNOSIS — E78.2 MIXED HYPERLIPIDEMIA: ICD-10-CM

## 2022-02-28 RX ORDER — METFORMIN HYDROCHLORIDE 500 MG/1
500 TABLET ORAL 2 TIMES DAILY WITH MEALS
Qty: 180 TABLET | Refills: 0 | Status: SHIPPED | OUTPATIENT
Start: 2022-02-28 | End: 2022-05-27

## 2022-02-28 RX ORDER — EZETIMIBE 10 MG/1
10 TABLET ORAL DAILY
Qty: 90 TABLET | Refills: 0 | Status: SHIPPED | OUTPATIENT
Start: 2022-02-28 | End: 2022-06-05

## 2022-02-28 RX ORDER — LOSARTAN POTASSIUM AND HYDROCHLOROTHIAZIDE 25; 100 MG/1; MG/1
1 TABLET ORAL DAILY
Qty: 90 TABLET | Refills: 0 | Status: SHIPPED | OUTPATIENT
Start: 2022-02-28 | End: 2022-05-27

## 2022-02-28 NOTE — TELEPHONE ENCOUNTER
Requested Prescriptions     Pending Prescriptions Disp Refills    metFORMIN (GLUCOPHAGE) 500 mg tablet 180 Tablet 0    losartan-hydroCHLOROthiazide (HYZAAR) 100-25 mg per tablet 90 Tablet 0     Sig: Take 1 Tablet by mouth daily.  ezetimibe (ZETIA) 10 mg tablet 90 Tablet 0     Sig: Take 1 Tablet by mouth daily for 90 days.         Last Visit 12/13/21  Last Refill   12/2/21 12/2/21 12/13/21

## 2022-03-19 PROBLEM — E11.8 TYPE II DIABETES MELLITUS WITH COMPLICATION (HCC): Status: ACTIVE | Noted: 2021-10-18

## 2022-04-03 DIAGNOSIS — I10 UNCONTROLLED HYPERTENSION: ICD-10-CM

## 2022-04-03 RX ORDER — AMLODIPINE BESYLATE 5 MG/1
5 TABLET ORAL DAILY
Qty: 90 TABLET | Refills: 0 | Status: SHIPPED | OUTPATIENT
Start: 2022-04-03 | End: 2022-06-30

## 2022-05-27 DIAGNOSIS — E11.9 CONTROLLED TYPE 2 DIABETES MELLITUS WITHOUT COMPLICATION, WITHOUT LONG-TERM CURRENT USE OF INSULIN (HCC): ICD-10-CM

## 2022-05-27 RX ORDER — LOSARTAN POTASSIUM AND HYDROCHLOROTHIAZIDE 25; 100 MG/1; MG/1
TABLET ORAL
Qty: 90 TABLET | Refills: 0 | Status: SHIPPED | OUTPATIENT
Start: 2022-05-27 | End: 2022-08-24

## 2022-05-27 RX ORDER — METFORMIN HYDROCHLORIDE 500 MG/1
TABLET ORAL
Qty: 180 TABLET | Refills: 0 | Status: SHIPPED | OUTPATIENT
Start: 2022-05-27 | End: 2022-08-24

## 2022-06-05 DIAGNOSIS — E78.2 MIXED HYPERLIPIDEMIA: ICD-10-CM

## 2022-06-05 RX ORDER — EZETIMIBE 10 MG/1
TABLET ORAL
Qty: 90 TABLET | Refills: 0 | Status: SHIPPED | OUTPATIENT
Start: 2022-06-05 | End: 2022-08-31

## 2022-06-30 DIAGNOSIS — I10 UNCONTROLLED HYPERTENSION: ICD-10-CM

## 2022-06-30 RX ORDER — AMLODIPINE BESYLATE 5 MG/1
TABLET ORAL
Qty: 90 TABLET | Refills: 0 | Status: SHIPPED | OUTPATIENT
Start: 2022-06-30 | End: 2022-09-24

## 2022-08-24 DIAGNOSIS — E11.9 CONTROLLED TYPE 2 DIABETES MELLITUS WITHOUT COMPLICATION, WITHOUT LONG-TERM CURRENT USE OF INSULIN (HCC): ICD-10-CM

## 2022-08-24 RX ORDER — LOSARTAN POTASSIUM AND HYDROCHLOROTHIAZIDE 25; 100 MG/1; MG/1
TABLET ORAL
Qty: 90 TABLET | Refills: 0 | Status: SHIPPED | OUTPATIENT
Start: 2022-08-24

## 2022-08-24 RX ORDER — METFORMIN HYDROCHLORIDE 500 MG/1
TABLET ORAL
Qty: 180 TABLET | Refills: 0 | Status: SHIPPED | OUTPATIENT
Start: 2022-08-24

## 2022-08-31 DIAGNOSIS — E78.2 MIXED HYPERLIPIDEMIA: ICD-10-CM

## 2022-08-31 RX ORDER — EZETIMIBE 10 MG/1
TABLET ORAL
Qty: 90 TABLET | Refills: 0 | Status: SHIPPED | OUTPATIENT
Start: 2022-08-31 | End: 2022-09-01

## 2022-09-01 DIAGNOSIS — E78.2 MIXED HYPERLIPIDEMIA: ICD-10-CM

## 2022-09-01 RX ORDER — EZETIMIBE 10 MG/1
TABLET ORAL
Qty: 90 TABLET | Refills: 0 | Status: SHIPPED | OUTPATIENT
Start: 2022-09-01 | End: 2022-09-01

## 2022-09-01 RX ORDER — EZETIMIBE 10 MG/1
TABLET ORAL
Qty: 90 TABLET | Refills: 0 | Status: SHIPPED | OUTPATIENT
Start: 2022-09-01

## 2022-09-21 ENCOUNTER — PATIENT OUTREACH (OUTPATIENT)
Dept: CASE MANAGEMENT | Age: 72
End: 2022-09-21

## 2022-09-21 NOTE — ACP (ADVANCE CARE PLANNING)
Advance Care Planning   Ambulatory ACP Specialist Patient Outreach    Date:  9/21/2022    ACP Specialist:  Patricia Hillman LPN    Outreach call to patient in follow-up to ACP Specialist referral from:    [] PCP  [] Provider   [] Ambulatory Care Management [x] Other     For:                  [x] Advance Directive Assistance              [] Complete Portable DNR order              [] Complete POST/MOST              [] Code Status Discussion             [] Discuss Goals of Care             [] Early ACP Decision-Making              [] Other (Specify)    Date Referral Received: 9/21/22    Today's Outreach:  [x] First   [] Second  [] Third      Intervention:  [x] Spoke with Patient's spouse   [] Left VM requesting return call      Outcome: Pt wishes to complete an AMD with her spouse on 10/6/22 at 9 am.        Next Step:   [x] ACP scheduled conversation  [] Outreach again in one week               [] Email / Mail [address]  [] Email / Mail Advance Directive   [] Closing referral.  Routing closure to referring provider/staff and to ACP Specialist . [] Closure letter mailed to patient with invitation to contact ACP Specialist if / when ready.   Thank you for this referral.

## 2022-09-24 DIAGNOSIS — I10 UNCONTROLLED HYPERTENSION: ICD-10-CM

## 2022-09-24 RX ORDER — AMLODIPINE BESYLATE 5 MG/1
TABLET ORAL
Qty: 90 TABLET | Refills: 0 | Status: SHIPPED | OUTPATIENT
Start: 2022-09-24

## 2022-10-06 ENCOUNTER — DOCUMENTATION ONLY (OUTPATIENT)
Dept: CASE MANAGEMENT | Age: 72
End: 2022-10-06

## 2022-10-06 NOTE — ACP (ADVANCE CARE PLANNING)
Advance Care Planning     Advance Care Planning Clinical Specialist  Conversation Note      Date of ACP Conversation: 10/06/22    Conversation Conducted with:  Patient with Decision Making Capacity    ACP Clinical Specialist: Angeline Sawant, [address] Decision Maker:    Current Designated Health Care Decision Maker:     Primary Decision Maker: Jennifer Dalton - [phone]  Today we confirmed pt's healthcare wishes haven't changed and worked towards completing her AMD     Care Preferences    Hospitalization: \"If your health worsens and it becomes clear that your chance of recovery is unlikely, what would your preference be regarding hospitalization? \"    Choice:  []  The patient wants hospitalization  []  The patient prefers comfort-focused treatment without hospitalization. Pt would like her agent to make this choice. Ventilation: \"If you were in your present state of health and suddenly became very ill and were unable to breathe on your own, what would your preference be about the use of a ventilator (breathing machine) if it were available to you? \"      If patient would desire the use of a ventilator (breathing machine), answer \"yes\", if not \"no\": Yes    \"If your health worsens and it becomes clear that your chance of recovery is unlikely, what would your preference be about the use of a ventilator (breathing machine) if it were available to you? \"     Would the patient desire the use of a ventilator (breathing machine)? No      Resuscitation  \"CPR works best to restart the heart when there is a sudden event, like a heart attack, in someone who is otherwise healthy. Unfortunately, CPR does not typically restart the heart for people who have serious health conditions or who are very sick. \"    \"In the event your heart stopped as a result of an underlying serious health condition, would you want attempts to be made to restart your heart (answer \"yes\" for attempt to resuscitate) or would you prefer a natural death (answer \"no\" for do not attempt to resuscitate)? \" No      [x] Yes  [] No   Educated Patient / [place] Serene regarding differences between Advance Directives and portable DNR orders. Length of ACP Conversation in minutes:  39    Conversation Outcomes:  [x] ACP discussion completed  [] Existing advance directive reviewed with patient; no changes to patient's previously recorded wishes   [x] New Advance Directive completed   [] Portable Do Not Resuscitate prepared for Provider review and signature  [] POLST/POST/MOLST/MOST prepared for Provider review and signature      Follow-up plan:    [] Schedule follow-up conversation to continue planning  [] Referred individual to Provider for additional questions/concerns   [x] Advised patient/agent/surrogate to review completed ACP document and update if needed with changes in condition, patient preferences or care setting     [x] This note routed to one or more involved healthcare providers  10/6  MONCHO called and spoke with pt and her spouse. Pt had ACP conversation back in December 2021 but she and her  had not completed the paper AMD's they have been sent. Pt's healthcare wishes were consistent with her previous wishes back in 2021. Pt does not wish to be a DNR at this time but she wouldn't want CPR attempted should she be at end of life. MONCHO explained how a DNR and AMD are different in expressing pt's wishes for CPR. MONCHO has sent pt DocuSign AMD and she has signed it.   Document will be uploaded into pt's chart and referral is good to close

## 2023-02-11 DIAGNOSIS — E11.9 CONTROLLED TYPE 2 DIABETES MELLITUS WITHOUT COMPLICATION, WITHOUT LONG-TERM CURRENT USE OF INSULIN (HCC): ICD-10-CM

## 2023-02-12 DIAGNOSIS — E11.9 CONTROLLED TYPE 2 DIABETES MELLITUS WITHOUT COMPLICATION, WITHOUT LONG-TERM CURRENT USE OF INSULIN (HCC): ICD-10-CM

## 2023-02-12 RX ORDER — METFORMIN HYDROCHLORIDE 500 MG/1
TABLET ORAL
Qty: 180 TABLET | Refills: 0 | Status: SHIPPED | OUTPATIENT
Start: 2023-02-12

## 2023-02-12 RX ORDER — LOSARTAN POTASSIUM AND HYDROCHLOROTHIAZIDE 25; 100 MG/1; MG/1
TABLET ORAL
Qty: 90 TABLET | Refills: 0 | Status: SHIPPED | OUTPATIENT
Start: 2023-02-12

## 2023-02-17 ENCOUNTER — OFFICE VISIT (OUTPATIENT)
Dept: PRIMARY CARE CLINIC | Age: 73
End: 2023-02-17
Payer: MEDICARE

## 2023-02-17 VITALS
OXYGEN SATURATION: 97 % | WEIGHT: 214 LBS | HEIGHT: 64 IN | SYSTOLIC BLOOD PRESSURE: 138 MMHG | TEMPERATURE: 97.5 F | DIASTOLIC BLOOD PRESSURE: 80 MMHG | BODY MASS INDEX: 36.54 KG/M2 | RESPIRATION RATE: 20 BRPM | HEART RATE: 94 BPM

## 2023-02-17 DIAGNOSIS — I10 PRIMARY HYPERTENSION: ICD-10-CM

## 2023-02-17 DIAGNOSIS — E11.8 TYPE II DIABETES MELLITUS WITH COMPLICATION (HCC): ICD-10-CM

## 2023-02-17 DIAGNOSIS — Z99.89 OSA ON CPAP: ICD-10-CM

## 2023-02-17 DIAGNOSIS — E66.01 SEVERE OBESITY (BMI 35.0-39.9) WITH COMORBIDITY (HCC): ICD-10-CM

## 2023-02-17 DIAGNOSIS — G43.019 INTRACTABLE MIGRAINE WITHOUT AURA AND WITHOUT STATUS MIGRAINOSUS: ICD-10-CM

## 2023-02-17 DIAGNOSIS — Z12.11 COLON CANCER SCREENING: ICD-10-CM

## 2023-02-17 DIAGNOSIS — G47.33 OSA ON CPAP: ICD-10-CM

## 2023-02-17 DIAGNOSIS — E78.2 MIXED HYPERLIPIDEMIA: ICD-10-CM

## 2023-02-17 DIAGNOSIS — Z00.00 MEDICARE ANNUAL WELLNESS VISIT, SUBSEQUENT: Primary | ICD-10-CM

## 2023-02-17 RX ORDER — SUMATRIPTAN 50 MG/1
50 TABLET, FILM COATED ORAL
Qty: 9 TABLET | Refills: 0 | Status: SHIPPED | OUTPATIENT
Start: 2023-02-17 | End: 2023-02-17

## 2023-02-17 NOTE — PROGRESS NOTES
1. \"Have you been to the ER, urgent care clinic since your last visit? Hospitalized since your last visit? \" No    2. \"Have you seen or consulted any other health care providers outside of the [address] [place] [address] since your last visit? \" Yes When: [facility]       3. For patients aged 39-70: Has the patient had a colonoscopy / FIT/ Cologuard? No      If the patient is female:    4. For patients aged 41-77: Has the patient had a mammogram within the past 2 years? Yes - no Care Gap present      5. For patients aged 21-65: Has the patient had a pap smear? NA - based on age or sex     . Chief Complaint   Patient presents with    Headache     Gets them already but. Always on the right side. Advil helps but it doesn't take it away fully and if it does it come back. Has been going on for weeks. Annual Wellness Visit             Zulema Adler is a 67 y.o. female and presents for Annual Medicare Wellness Visit. Assessment of cognitive impairment: Alert and oriented x 3. Depression Screen:   3 most recent PHQ Screens 12/13/2021   Little interest or pleasure in doing things Not at all   Feeling down, depressed, irritable, or hopeless Not at all   Total Score PHQ 2 0       . Fall Risk Assessment, last 12 mths 2/17/2023   Able to walk? Yes   Fall in past 12 months? 0   Do you feel unsteady? 0   Are you worried about falling 0   Is the gait abnormal? -   Number of falls in past 12 months -   Fall with injury? -           .  Abuse Screening Questionnaire 2/17/2023   Do you ever feel afraid of your partner? N   Are you in a relationship with someone who physically or mentally threatens you? N   Is it safe for you to go home? Y          Activities of Daily Living:  Self-care. Requires assistance with: no ADLs  Patient handle his/her own medications  Yes Use of pill box  Yes      .   ADL Assessment 2/17/2023   Feeding yourself No Help Needed   Getting from bed to chair No Help Needed   Getting dressed No Help Needed Bathing or showering No Help Needed   Walk across the room (includes cane/walker) No Help Needed   Using the telphone No Help Needed   Taking your medications No Help Needed   Preparing meals No Help Needed   Managing money (expenses/bills) No Help Needed   Moderately strenuous housework (laundry) No Help Needed   Shopping for personal items (toiletries/medicines) No Help Needed   Shopping for groceries No Help Needed   Driving No Help Needed   Climbing a flight of stairs No Help Needed   Getting to places beyond walking distances No Help Needed      Health Maintenance:  Daily Aspirin: no  Bone Density: up to date  Glaucoma Screening: yes  Immunizations:    Tetanus: up to date. Influenza: up to date. Shingles: None due to the  cost .  PPSV-23: up to date. Prevnar-13: up to date. [id number]: up to date    Cancer screening:    Cervical: N/A age . Breast: up to date. Colon: No. Will do cologuard       Alcohol Risk Screen:   On any occasion during the past 3 months, have you had more than 3 drinks(female) or 4 drinks (male) containing alcohol in one? No  Do you average more than 7 drinks (female) or 14 drinks (male) per week? No  Type and amount:None    Hearing Loss:  Hearing is good. Vision Loss:   Wears glasses, contact lenses, or have any other visual impairment  Yes    Adult Nutrition Screen:  No risk factors noted. Advance Care Planning:   End of Life Planning: has an advanced directive - a copy HAS NOT been provided. [address] ACP-Facilitator appointment no      Medications/Allergies: Reviewed with patient  Prior to Admission medications    Medication Sig Start Date End Date Taking? Authorizing Provider   losartan-hydroCHLOROthiazide (HYZAAR) 100-25 mg per tablet TAKE 1 TABLET BY MOUTH ONE TIME DAILY. NEED APPOINTMENT FOR NEXT REFILL.  2/12/23   Eunice Lacey MD   metFORMIN (GLUCOPHAGE) 500 mg tablet TAKE 1 TABLET BY MOUTH TWICE DAILY WITH MEALS 2/12/23   Eunice Lacey MD amLODIPine (NORVASC) 5 mg tablet Take 1 tablet by mouth once daily. 12/21/22   Abdiaziz Flores MD   ezetimibe (ZETIA) 10 mg tablet TAKE 1 TABLET BY MOUTH ONE TIME DAILY 9/1/22   Abdiaziz Flores MD   Cholecalciferol, Vitamin D3, (VITAMIN D) 1,000 unit Cap Take  by mouth. Provider, Historical   VIT C/VITAMIN E ACETATE/CRANB (CRANBERRY CONC-ASCORBIC ACID PO) Take  by mouth. Provider, Historical   multivitamin, stress formula (STRESS TAB) tablet Take 1 Tab by mouth daily. Provider, Historical   FLAXSEED OIL PO Take  by mouth. Patient not taking: Reported on 10/18/2021    Provider, Historical   CALCIUM PO Take 500 mg by mouth two (2) times a day. 6/21/11   Provider, Historical   DOCOSAHEXANOIC ACID/EPA (FISH OIL PO) Take 1,000 mg by mouth daily. 2 tabs qd 6/21/11   Provider, Historical       Allergies   Allergen Reactions    Nitrofurantoin Monohyd/M-Cryst Itching    Codeine Nausea Only     GI    Macrodantin [Nitrofurantoin Macrocrystalline] Itching    Tenoretic 100 [Atenolol-Chlorthalidone] Other (comments)     fatigue    Zocor [Simvastatin] Swelling       Objective:  Visit Vitals  /80 (BP 1 Location: Right upper arm, BP Patient Position: Sitting)   Pulse 94   Temp 97.5 °F (36.4 °C) (Temporal)   Resp 20   Ht 5' 4\" (1.626 m)   Wt 214 lb (97.1 kg)   SpO2 97%   BMI 36.73 kg/m²        Problem List: Reviewed with patient and discussed risk factors.     Patient Active Problem List   Diagnosis Code    Essential hypertension, benign I10    Mixed hyperlipidemia E78.2    Esophageal reflux K21.9    Sleep apnea, obstructive G47.33    Type II diabetes mellitus with complication (HCC) S60.0       PSH: Reviewed with patient  Past Surgical History:   Procedure Laterality Date    HX GYN      HX TUBAL LIGATION      [place] BREAST SURGERY PROCEDURE UNLISTED      breast biopsy        SH: Reviewed with patient  Social History     Tobacco Use    Smoking status: Never    Smokeless tobacco: Never   Vaping Use    Vaping Use: Never used Substance Use Topics    Alcohol use: No    Drug use: No       FH: Reviewed with patient  Family History   Problem Relation Age of Onset    Cancer Mother         breast    Cancer Brother         leukemia    Cancer Sister        Current medical providers:    Patient Care Team:  Kemar Ghosh MD as PCP - General (Internal Medicine Physician)  [facility] Ghosh [facility]  Hansel Azar MD as Physician (Sleep Medicine Physician)    Plan:    Diagnoses and all orders for this visit:    Medicare annual wellness visit, subsequent  . Age appropriate Health screening and immunization discussed with patient. Colon cancer screening  -     COLOGUARD TEST (FECAL DNA COLORECTAL CANCER SCREENING)      Health Maintenance   Topic Date Due    Eye Exam Retinal or Dilated  Never done    Colorectal Cancer Screening Combo  Never done    Shingles Vaccine (1 of 2) Never done    Diabetic Alb to Cr ratio (uACR) test  10/18/2022    Foot Exam Q1  10/18/2022    Medicare Yearly Exam  10/19/2022    COVID-19 Vaccine (4 - Booster for Doris series) 11/30/2022    Depression Screen  12/13/2022    GFR test (Diabetes, CKD 3-4, OR last GFR 15-59)  02/28/2023    A1C test (Diabetic or Prediabetic)  02/28/2023    Lipid Screen  02/28/2023    Breast Cancer Screen Mammogram  05/30/2023    DTaP/Tdap/Td series (2 - Td or Tdap) 08/03/2026    Hepatitis C Screening  Completed    Bone Densitometry (Dexa) Screening  Completed    Flu Vaccine  Completed    Pneumococcal 65+ years  Completed          Urinary/ Fecal Incontinence: No    Regular physical exercise: Walks, daily when the weather is nice. Patient verbalized understanding of information presented. AVS and Medicare Part B Preventive Services Table printed and given to pt and reviewed. See table for findings under Recommendation and Scheduled. All of the patient's questions were answered.

## 2023-02-17 NOTE — PROGRESS NOTES
Faby Crook (: 1950) is a 67 y.o. female, established patient, here for evaluation of the following chief complaint(s):  Headache (Gets them already but. Always on the right side. Advil helps but it doesn't take it away fully and if it does it come back. Has been going on for weeks. ) and Annual Wellness Visit       ASSESSMENT/PLAN:  Below is the assessment and plan developed based on review of pertinent history, physical exam, labs, studies, and medications. 1. Type II diabetes mellitus with complication ([facility].)  -      DIABETES FOOT EXAM  -     HEMOGLOBIN A1C WITH EAG; Future  -     MICROALBUMIN, UR, RAND W/ MICROALB/CREAT RATIO; Future  -     METABOLIC PANEL, COMPREHENSIVE; Future  -     CBC W/O DIFF; Future  I ordered a CBC, CMP, and blood work to check her HbA1c. I ordred a urine sample to check her microalbumin. 2. Severe obesity (BMI 35.0-39. 9) with comorbidity ([facility].)  I recommend that she monitor her diet and exercise regularly. 3. Primary hypertension  I recommend that she continue taking Zetia 10 mg daily, amlodipine 5 mg nightly and losartan-HCTZ 100-25 mg daily. 4. Mixed hyperlipidemia  -     LIPID PANEL; Future  I ordered a lipid panel. 5. Intractable migraine without aura and without status migrainosus  -     SUMAtriptan (IMITREX) 50 mg tablet; Take 1 Tablet by mouth once as needed for Migraine for up to 1 dose., Normal, Disp-9 Tablet, R-0 sent to pharmacy. I prescribed Imitrex 50 mg to be taken PRN. Potential side effects were discussed. 6. CINTHIA on CPAP  I recommend that she use her CPAP machine. SUBJECTIVE/OBJECTIVE:  HPI  Patient presents today for a MWV and follow up on chronic conditions. She is not fasting today. Her BP is elevated today in office at 158/90, 138/80 on manual repeat in right arm while sitting down. She has been having head aches on her right side. She states that she has been taking two tylenol which help to some degree.  She has been measuring her BP at home and measures in the 150s. She has been taking aking Zetia 10 mg daily and amlodipine 5 mg nightly, and losartan-HCTZ 100-25 mg daily. She denies any constipation, leg swelling, or tingling in her feet. She has not been checking her BG levels. She tries to walk occasionally. She is taking metformin 500 mg BID. She uses a CPAP at night but she states that she does not sleep well due to the migraine. She is UTD for flu shot. She has a COVID booster. She had her last eye exam in May  She has not had the shingles shot. Patient Active Problem List   Diagnosis Code    Essential hypertension, benign I10    Mixed hyperlipidemia E78.2    Esophageal reflux K21.9    Sleep apnea, obstructive G47.33    Type II diabetes mellitus with complication (HCC) C53.0    CINTHIA on CPAP G47.33, Z99.89    Intractable migraine without aura and without status migrainosus G43.019        Current Outpatient Medications on File Prior to Visit   Medication Sig Dispense Refill    ZINC PO Take  by mouth.      losartan-hydroCHLOROthiazide (HYZAAR) 100-25 mg per tablet TAKE 1 TABLET BY MOUTH ONE TIME DAILY. NEED APPOINTMENT FOR NEXT REFILL. 90 Tablet 0    metFORMIN (GLUCOPHAGE) 500 mg tablet TAKE 1 TABLET BY MOUTH TWICE DAILY WITH MEALS 180 Tablet 0    amLODIPine (NORVASC) 5 mg tablet Take 1 tablet by mouth once daily. 90 Tablet 0    ezetimibe (ZETIA) 10 mg tablet TAKE 1 TABLET BY MOUTH ONE TIME DAILY 90 Tablet 0    cholecalciferol (VITAMIN D3) 25 mcg (1,000 unit) cap Take  by mouth. VIT C/VITAMIN E ACETATE/CRANB (CRANBERRY CONC-ASCORBIC ACID PO) Take  by mouth.      multivitamin, stress formula (STRESS TAB) tablet Take 1 Tab by mouth daily. FLAXSEED OIL PO Take  by mouth. DOCOSAHEXANOIC ACID/EPA (FISH OIL PO) Take 1,000 mg by mouth daily. 2 tabs qd      CALCIUM PO Take 500 mg by mouth two (2) times a day. No current facility-administered medications on file prior to visit.        Allergies Allergen Reactions    Nitrofurantoin Monohyd/M-Cryst Itching    Codeine Nausea Only     GI    Macrodantin [Nitrofurantoin Macrocrystalline] Itching    Tenoretic 100 [Atenolol-Chlorthalidone] Other (comments)     fatigue    Zocor [Simvastatin] Swelling       Past Medical History:   Diagnosis Date    Esophageal reflux 4/13/2010    Essential hypertension, benign 4/13/2010    Mixed hyperlipidemia 4/13/2010    Obesity, unspecified 4/13/2010    CINTHIA (obstructive sleep apnea) 08-    AHI: 5.6 per hour       Past Surgical History:   Procedure Laterality Date    HX GYN      HX TUBAL LIGATION      [place] UNLISTED PROCEDURE BREAST      breast biopsy       Family History   Problem Relation Age of Onset    Cancer Mother         breast    Cancer Brother         leukemia    Cancer Sister        Social History     Socioeconomic History    Marital status:      Spouse name: Not on file    Number of children: Not on file    Years of education: Not on file    Highest education level: Not on file   Occupational History    Not on file   Tobacco Use    Smoking status: Never    Smokeless tobacco: Never   Vaping Use    Vaping Use: Never used   Substance and Sexual Activity    Alcohol use: No    Drug use: No    Sexual activity: Not Currently   Other Topics Concern     Service No    Blood Transfusions No    Caffeine Concern No     Comment: 2 cups of coffee daily    Occupational Exposure No    Hobby Hazards No    Sleep Concern No    Stress Concern No    Weight Concern Yes    Special Diet No    Back Care Yes    Exercise No    Bike Helmet No    Seat Belt Yes    Self-Exams No   Social History Narrative    Not on file     Social Determinants of Health     Financial Resource Strain: Not on file   Food Insecurity: Not on file   Transportation Needs: Not on file   Physical Activity: Not on file   Stress: Not on file   Social Connections: Not on file   Intimate Partner Violence: Not on file   Housing Stability: Not on file       No visits with results within 3 Month(s) from this visit. Latest known visit with results is:   Orders Only on 02/28/2022   Component Date Value Ref Range Status    Hemoglobin A1c 02/28/2022 6.6 (A)  4.0 - 5.6 % Final    Comment: NEW METHOD PLEASE NOTE NEW REFERENCE RANGE  (NOTE)  HbA1C Interpretive Ranges  <5.7              Normal  5.7 - 6.4         Consider Prediabetes  >6.5              Consider Diabetes      Est. average glucose 02/28/2022 143  mg/dL Final    Cholesterol, total 02/28/2022 196  <200 MG/DL Final    Triglyceride 02/28/2022 151 (A)  <150 MG/DL Final    Comment: Based on NCEP-ATP III:  Triglycerides <150 mg/dL  is considered normal, 150-199  mg/dL  borderline high,  200-499 mg/dL high and  greater than or equal to 500  mg/dL very high. HDL Cholesterol 02/28/2022 63  MG/DL Final    Comment: Based on NCEP ATP III, HDL Cholesterol <40 mg/dL is considered low and >60  mg/dL is elevated.       LDL, calculated 02/28/2022 102.8 (A)  0 - 100 MG/DL Final    Comment: Based on the NCEP-ATP: LDL-C concentrations are considered  optimal <100 mg/dL,  near optimal/above Normal 100-129 mg/dL Borderline High: 130-159, High: 160-189  mg/dL Very High: Greater than or equal to 190 mg/dL      VLDL, calculated 02/28/2022 30.2  MG/DL Final    CHOL/HDL Ratio 02/28/2022 3.1  0.0 - 5.0   Final    Sodium 02/28/2022 136  136 - 145 mmol/L Final    Potassium 02/28/2022 4.2  3.5 - 5.1 mmol/L Final    Chloride 02/28/2022 103  97 - 108 mmol/L Final    CO2 02/28/2022 30  21 - 32 mmol/L Final    Anion gap 02/28/2022 3 (A)  5 - 15 mmol/L Final    Glucose 02/28/2022 148 (A)  65 - 100 mg/dL Final    BUN 02/28/2022 15  6 - 20 MG/DL Final    Creatinine 02/28/2022 0.78  0.55 - 1.02 MG/DL Final    BUN/Creatinine ratio 02/28/2022 19  12 - 20   Final    GFR est AA 02/28/2022 >60  >60 ml/min/1.73m2 Final    GFR est non-AA 02/28/2022 >60  >60 ml/min/1.73m2 Final    Comment: Estimated GFR is calculated using the IDMS-traceable Modification of Diet in  Renal Disease (MDRD) Study equation, reported for both  Americans  (GFRAA) and non- Americans (GFRNA), and normalized to 1.73m2 body  surface area. The physician must decide which value applies to the patient. Calcium 02/28/2022 9.6  8.5 - 10.1 MG/DL Final    Bilirubin, total 02/28/2022 0.7  0.2 - 1.0 MG/DL Final    ALT (SGPT) 02/28/2022 30  12 - 78 U/L Final    AST (SGOT) 02/28/2022 13 (A)  15 - 37 U/L Final    Alk. phosphatase 02/28/2022 77  45 - 117 U/L Final    Protein, total 02/28/2022 7.0  6.4 - 8.2 g/dL Final    Albumin 02/28/2022 4.0  3.5 - 5.0 g/dL Final    Globulin 02/28/2022 3.0  2.0 - 4.0 g/dL Final    A-G Ratio 02/28/2022 1.3  1.1 - 2.2   Final       Review of Systems   Constitutional:  Negative for activity change, fatigue and unexpected weight change. HENT:  Negative for congestion, hearing loss, rhinorrhea and sore throat. Eyes:  Negative for discharge. Respiratory:  Negative for cough, chest tightness and shortness of breath. Cardiovascular:  Negative for leg swelling. Gastrointestinal:  Negative for abdominal pain, constipation and diarrhea. Genitourinary:  Negative for dysuria, flank pain, frequency and urgency. Musculoskeletal:  Negative for arthralgias, back pain and myalgias. Skin:  Negative for color change and rash. Neurological:  Positive for headaches (migraine). Negative for dizziness and light-headedness. Psychiatric/Behavioral:  Negative for dysphoric mood and sleep disturbance. The patient is not nervous/anxious. Visit Vitals  /80 (BP 1 Location: Right upper arm, BP Patient Position: Sitting)   Pulse 94   Temp 97.5 °F (36.4 °C) (Temporal)   Resp 20   Ht 5' 4\" (1.626 m)   Wt 214 lb (97.1 kg)   SpO2 97%   BMI 36.73 kg/m²       Physical Exam  Vitals and nursing note reviewed. Constitutional:       General: She is not in acute distress. Appearance: She is obese. She is not diaphoretic.    HENT:      Right Ear: External ear normal.      Left Ear: External ear normal.   Eyes:      General: No scleral icterus. Right eye: No discharge. Left eye: No discharge. Extraocular Movements: Extraocular movements intact. Conjunctiva/sclera: Conjunctivae normal.   Cardiovascular:      Rate and Rhythm: Normal rate and regular rhythm. Pulmonary:      Effort: Pulmonary effort is normal.      Breath sounds: Normal breath sounds. No wheezing. Abdominal:      General: Bowel sounds are normal.      Palpations: Abdomen is soft. Tenderness: There is no abdominal tenderness. Musculoskeletal:      Cervical back: Normal range of motion and neck supple. Feet:      Comments: Diabetic foot exam:     Left: Vibratory sensation normal    Sharp/dull discrimination normal    Filament test normal sensation with micro filament   Pulse DP: 2+ (normal)   Deformities: None  Right: Vibratory sensation normal   Sharp/dull discrimination normal   Filament test normal sensation with micro filament   Pulse DP: 2+ (normal)   Deformities: None    Lymphadenopathy:      Cervical: No cervical adenopathy. Neurological:      Mental Status: She is alert and oriented to person, place, and time. Psychiatric:         Mood and Affect: Mood and affect normal.         I, Mark Pisano MD, personally performed the services described in this documentation as scribed by Chato Woodward in my presence, and it is both accurate and complete. An electronic signature was used to authenticate this note.   -- Chato Woodward

## 2023-02-24 DIAGNOSIS — G43.019 INTRACTABLE MIGRAINE WITHOUT AURA AND WITHOUT STATUS MIGRAINOSUS: Primary | ICD-10-CM

## 2023-02-24 RX ORDER — TOPIRAMATE 25 MG/1
25 TABLET ORAL 2 TIMES DAILY
Qty: 60 TABLET | Refills: 0 | Status: SHIPPED | OUTPATIENT
Start: 2023-02-24 | End: 2023-03-26

## 2023-03-08 DIAGNOSIS — R51.9 SEVERE HEADACHE: Primary | ICD-10-CM

## 2023-03-08 RX ORDER — METHYLPREDNISOLONE 4 MG/1
TABLET ORAL
Qty: 1 DOSE PACK | Refills: 0 | Status: SHIPPED | OUTPATIENT
Start: 2023-03-08

## 2023-03-14 ENCOUNTER — TELEPHONE (OUTPATIENT)
Dept: PRIMARY CARE CLINIC | Age: 73
End: 2023-03-14

## 2023-03-14 NOTE — TELEPHONE ENCOUNTER
Patient says Dr Gena Hernandez told her to see her right away for her ears. Our first availability is the 28th. Patient was not happy with that.

## 2023-03-15 ENCOUNTER — OFFICE VISIT (OUTPATIENT)
Dept: PRIMARY CARE CLINIC | Age: 73
End: 2023-03-15
Payer: MEDICARE

## 2023-03-15 ENCOUNTER — HOSPITAL ENCOUNTER (OUTPATIENT)
Dept: GENERAL RADIOLOGY | Age: 73
Discharge: HOME | End: 2023-03-15
Attending: INTERNAL MEDICINE
Payer: MEDICARE

## 2023-03-15 VITALS
BODY MASS INDEX: 35.68 KG/M2 | RESPIRATION RATE: 16 BRPM | OXYGEN SATURATION: 97 % | HEART RATE: 93 BPM | HEIGHT: 64 IN | DIASTOLIC BLOOD PRESSURE: 84 MMHG | SYSTOLIC BLOOD PRESSURE: 135 MMHG | TEMPERATURE: 97.5 F | WEIGHT: 209 LBS

## 2023-03-15 DIAGNOSIS — J01.00 SUBACUTE MAXILLARY SINUSITIS: Primary | ICD-10-CM

## 2023-03-15 DIAGNOSIS — M54.2 NECK PAIN: ICD-10-CM

## 2023-03-15 DIAGNOSIS — H93.8X3 SENSATION OF FULLNESS IN BOTH EARS: ICD-10-CM

## 2023-03-15 DIAGNOSIS — R51.9 FREQUENT HEADACHES: ICD-10-CM

## 2023-03-15 PROCEDURE — 3079F DIAST BP 80-89 MM HG: CPT | Performed by: INTERNAL MEDICINE

## 2023-03-15 PROCEDURE — G8417 CALC BMI ABV UP PARAM F/U: HCPCS | Performed by: INTERNAL MEDICINE

## 2023-03-15 PROCEDURE — G8427 DOCREV CUR MEDS BY ELIG CLIN: HCPCS | Performed by: INTERNAL MEDICINE

## 2023-03-15 PROCEDURE — G9899 SCRN MAM PERF RSLTS DOC: HCPCS | Performed by: INTERNAL MEDICINE

## 2023-03-15 PROCEDURE — G8399 PT W/DXA RESULTS DOCUMENT: HCPCS | Performed by: INTERNAL MEDICINE

## 2023-03-15 PROCEDURE — 3017F COLORECTAL CA SCREEN DOC REV: CPT | Performed by: INTERNAL MEDICINE

## 2023-03-15 PROCEDURE — G8432 DEP SCR NOT DOC, RNG: HCPCS | Performed by: INTERNAL MEDICINE

## 2023-03-15 PROCEDURE — 1101F PT FALLS ASSESS-DOCD LE1/YR: CPT | Performed by: INTERNAL MEDICINE

## 2023-03-15 PROCEDURE — 3075F SYST BP GE 130 - 139MM HG: CPT | Performed by: INTERNAL MEDICINE

## 2023-03-15 PROCEDURE — 1123F ACP DISCUSS/DSCN MKR DOCD: CPT | Performed by: INTERNAL MEDICINE

## 2023-03-15 PROCEDURE — G8536 NO DOC ELDER MAL SCRN: HCPCS | Performed by: INTERNAL MEDICINE

## 2023-03-15 PROCEDURE — 72050 X-RAY EXAM NECK SPINE 4/5VWS: CPT

## 2023-03-15 PROCEDURE — 99214 OFFICE O/P EST MOD 30 MIN: CPT | Performed by: INTERNAL MEDICINE

## 2023-03-15 PROCEDURE — 1090F PRES/ABSN URINE INCON ASSESS: CPT | Performed by: INTERNAL MEDICINE

## 2023-03-15 RX ORDER — FLUTICASONE PROPIONATE 50 MCG
2 SPRAY, SUSPENSION (ML) NASAL DAILY
Qty: 1 EACH | Refills: 0 | Status: SHIPPED | OUTPATIENT
Start: 2023-03-15 | End: 2023-04-14

## 2023-03-15 RX ORDER — AMOXICILLIN AND CLAVULANATE POTASSIUM 875; 125 MG/1; MG/1
1 TABLET, FILM COATED ORAL EVERY 12 HOURS
Qty: 20 TABLET | Refills: 0 | Status: SHIPPED | OUTPATIENT
Start: 2023-03-15 | End: 2023-03-25

## 2023-03-15 NOTE — PROGRESS NOTES
Chief Complaint   Patient presents with    Headache     On going for a month    Ear Pain     Couple of weeks       Visit Vitals  /84 (BP 1 Location: Left upper arm)   Pulse 93   Temp 97.5 °F (36.4 °C)   Resp 16   Ht 5' 4\" (1.626 m)   Wt 209 lb (94.8 kg)   SpO2 97%   BMI 35.87 kg/m²       1. Have you been to the ER, urgent care clinic since your last visit? Hospitalized since your last visit? No    2. Have you seen or consulted any other health care providers outside of the [address] [place] [address] since your last visit? Include any pap smears or colon screening. No      3. For patients aged 39-70: Has the patient had a colonoscopy / FIT/ Cologuard? Yes - Care Gap present. Most recent result on file      If the patient is female:    4. For patients aged 41-77: Has the patient had a mammogram within the past 2 years? Yes - Care Gap present. Most recent result on file      5. For patients aged 21-65: Has the patient had a pap smear?  NA - based on age or sex

## 2023-03-15 NOTE — PROGRESS NOTES
Any Grande (: 1950) is a 67 y.o. female, established patient, here for evaluation of the following chief complaint(s):  Headache (On going for a month) and Ear Pain (Couple of weeks)         ASSESSMENT/PLAN:  Below is the assessment and plan developed based on review of pertinent history, physical exam, labs, studies, and medications. 1. Subacute maxillary sinusitis  -     amoxicillin-clavulanate (AUGMENTIN) 875-125 mg per tablet; Take 1 Tablet by mouth every twelve (12) hours for 10 days. , Normal, Disp-20 Tablet, R-0 sent to pharmacy. -     fluticasone propionate (FLONASE) 50 mcg/actuation nasal spray; 2 Sprays by Both Nostrils route daily for 30 days. , Normal, Disp-1 Each, R-0 sent to pharmacy. I prescribed Augmentin 875-125 mg BID for 10 days. Potential side effects were discussed. I prescribed Flonase nasal spray to use 2 sprays in both nostrils. Potential side effects were discussed. 2. Sensation of fullness in both ears  The sensation of fullness in both ears should improve after the use of Augmentin. 3. Neck pain  -     XR SPINE CERV 4 OR 5 V; Future  I ordered a neck X-Ray. 4. Frequent headaches  -     XR SPINE CERV 4 OR 5 V; Future  I ordered a neck X-Ray. SUBJECTIVE/OBJECTIVE:  HPI  The patient presents today for headache and ear symptoms. She has been having headaches and she does not have good balance. Her headaches have been occurring for about a month. She feels ringing and fullness in her ears. She has not vision changes. She had a \"fuzzy feeling\" in her head when she took her Medrol Dosepack. She has a pain in the back of her neck and headaches in the middle of the night. She is taking Topamax 25 mg BID and she says it helps her lose weight so she chose not to stop taking it. She does not check her blood sugar levels. She doubled on the metformin 500 mg BID with meals. She is UTD for an eye exam in May 2022.   Patient Active Problem List   Diagnosis Code Essential hypertension, benign I10    Mixed hyperlipidemia E78.2    Esophageal reflux K21.9    Sleep apnea, obstructive G47.33    Type II diabetes mellitus with complication (HCC) F00.3    CINTHIA on CPAP G47.33, Z99.89    Intractable migraine without aura and without status migrainosus G43.019        Current Outpatient Medications on File Prior to Visit   Medication Sig Dispense Refill    topiramate (TOPAMAX) 25 mg tablet Take 1 Tablet by mouth two (2) times a day for 30 days. 60 Tablet 0    losartan-hydroCHLOROthiazide (HYZAAR) 100-25 mg per tablet TAKE 1 TABLET BY MOUTH ONE TIME DAILY. NEED APPOINTMENT FOR NEXT REFILL. 90 Tablet 0    metFORMIN (GLUCOPHAGE) 500 mg tablet TAKE 1 TABLET BY MOUTH TWICE DAILY WITH MEALS 180 Tablet 0    amLODIPine (NORVASC) 5 mg tablet Take 1 tablet by mouth once daily. 90 Tablet 0    ezetimibe (ZETIA) 10 mg tablet TAKE 1 TABLET BY MOUTH ONE TIME DAILY 90 Tablet 0    cholecalciferol (VITAMIN D3) 25 mcg (1,000 unit) cap Take  by mouth. VIT C/VITAMIN E ACETATE/CRANB (CRANBERRY CONC-ASCORBIC ACID PO) Take  by mouth.      multivitamin, stress formula (STRESS TAB) tablet Take 1 Tab by mouth daily. FLAXSEED OIL PO Take  by mouth. CALCIUM PO Take 500 mg by mouth two (2) times a day. DOCOSAHEXANOIC ACID/EPA (FISH OIL PO) Take 1,000 mg by mouth daily. 2 tabs qd      methylPREDNISolone (MEDROL DOSEPACK) 4 mg tablet As directed (Patient not taking: Reported on 3/15/2023) 1 Dose Pack 0    ZINC PO Take  by mouth. (Patient not taking: Reported on 3/15/2023)       No current facility-administered medications on file prior to visit.        Allergies   Allergen Reactions    Nitrofurantoin Monohyd/M-Cryst Itching    Codeine Nausea Only     GI    Macrodantin [Nitrofurantoin Macrocrystalline] Itching    Tenoretic 100 [Atenolol-Chlorthalidone] Other (comments)     fatigue    Zocor [Simvastatin] Swelling       Past Medical History:   Diagnosis Date    Esophageal reflux 4/13/2010 Essential hypertension, benign 4/13/2010    Mixed hyperlipidemia 4/13/2010    Obesity, unspecified 4/13/2010    CINTHIA (obstructive sleep apnea) 08-    AHI: 5.6 per hour       Past Surgical History:   Procedure Laterality Date    HX GYN      HX TUBAL LIGATION      NC UNLISTED PROCEDURE BREAST      breast biopsy       Family History   Problem Relation Age of Onset    Cancer Mother         breast    Cancer Brother         leukemia    Cancer Sister        Social History     Socioeconomic History    Marital status:      Spouse name: Not on file    Number of children: Not on file    Years of education: Not on file    Highest education level: Not on file   Occupational History    Not on file   Tobacco Use    Smoking status: Never    Smokeless tobacco: Never   Vaping Use    Vaping Use: Never used   Substance and Sexual Activity    Alcohol use: No    Drug use: No    Sexual activity: Not Currently   Other Topics Concern     Service No    Blood Transfusions No    Caffeine Concern No     Comment: 2 cups of coffee daily    Occupational Exposure No    Hobby Hazards No    Sleep Concern No    Stress Concern No    Weight Concern Yes    Special Diet No    Back Care Yes    Exercise No    Bike Helmet No    Seat Belt Yes    Self-Exams No   Social History Narrative    Not on file     Social Determinants of Health     Financial Resource Strain: Unknown    Difficulty of Paying Living Expenses: Patient refused   Food Insecurity: Unknown    Worried About Running Out of Food in the Last Year: Patient refused    Ran Out of Food in the Last Year: Patient refused   Transportation Needs: Not on file   Physical Activity: Not on file   Stress: Not on file   Social Connections: Not on file   Intimate Partner Violence: Not on file   Housing Stability: Not on file       Orders Only on 02/22/2023   Component Date Value Ref Range Status    Cholesterol, total 02/22/2023 189  <200 MG/DL Final    Triglyceride 02/22/2023 199 (A)  <150 MG/DL Final    Based on NCEP-ATP III:  Triglycerides <150 mg/dL  is considered normal, 150-199 mg/dL  borderline high,  200-499 mg/dL high and  greater than or equal to 500 mg/dL very high. HDL Cholesterol 02/22/2023 57  MG/DL Final    Based on NCEP ATP III, HDL Cholesterol <40 mg/dL is considered low and >60 mg/dL is elevated. LDL, calculated 02/22/2023 92.2  0 - 100 MG/DL Final    Comment: Based on the NCEP-ATP: LDL-C concentrations are considered  optimal <100 mg/dL,  near optimal/above Normal 100-129 mg/dL  Borderline High: 130-159, High: 160-189 mg/dL  Very High: Greater than or equal to 190 mg/dL      VLDL, calculated 02/22/2023 39.8  MG/DL Final    CHOL/HDL Ratio 02/22/2023 3.3  0.0 - 5.0   Final    WBC 02/22/2023 6.8  3.6 - 11.0 K/uL Final    RBC 02/22/2023 4.52  3.80 - 5.20 M/uL Final    HGB 02/22/2023 14.0  11.5 - 16.0 g/dL Final    HCT 02/22/2023 43.8  35.0 - 47.0 % Final    MCV 02/22/2023 96.9  80.0 - 99.0 FL Final    MCH 02/22/2023 31.0  26.0 - 34.0 PG Final    MCHC 02/22/2023 32.0  30.0 - 36.5 g/dL Final    RDW 02/22/2023 12.3  11.5 - 14.5 % Final    PLATELET 04/51/8767 977  150 - 400 K/uL Final    MPV 02/22/2023 11.3  8.9 - 12.9 FL Final    NRBC 02/22/2023 0.0  0  WBC Final    ABSOLUTE NRBC 02/22/2023 0.00  0.00 - 0.01 K/uL Final    Sodium 02/22/2023 142  136 - 145 mmol/L Final    Potassium 02/22/2023 4.2  3.5 - 5.1 mmol/L Final    Chloride 02/22/2023 104  97 - 108 mmol/L Final    CO2 02/22/2023 31  21 - 32 mmol/L Final    Anion gap 02/22/2023 7  5 - 15 mmol/L Final    Glucose 02/22/2023 147 (A)  65 - 100 mg/dL Final    BUN 02/22/2023 16  6 - 20 MG/DL Final    Creatinine 02/22/2023 0.82  0.55 - 1.02 MG/DL Final    BUN/Creatinine ratio 02/22/2023 20  12 - 20   Final    eGFR 02/22/2023 >60  >60 ml/min/1.73m2 Final    Comment:   Pediatric calculator link: [employer]. org/professionals/kdoqi/gfr_calculatorped    These results are not intended for use in patients <18 years of age.    eGFR results are calculated without a race factor using  the 2021 CKD-EPI equation. Careful clinical correlation is recommended, particularly when comparing to results calculated using previous equations. The CKD-EPI equation is less accurate in patients with extremes of muscle mass, extra-renal metabolism of creatinine, excessive creatine ingestion, or following therapy that affects renal tubular secretion. Calcium 02/22/2023 10.0  8.5 - 10.1 MG/DL Final    Bilirubin, total 02/22/2023 0.7  0.2 - 1.0 MG/DL Final    ALT (SGPT) 02/22/2023 36  12 - 78 U/L Final    AST (SGOT) 02/22/2023 18  15 - 37 U/L Final    Alk. phosphatase 02/22/2023 75  45 - 117 U/L Final    Protein, total 02/22/2023 7.1  6.4 - 8.2 g/dL Final    Albumin 02/22/2023 4.1  3.5 - 5.0 g/dL Final    Globulin 02/22/2023 3.0  2.0 - 4.0 g/dL Final    A-G Ratio 02/22/2023 1.4  1.1 - 2.2   Final    Microalbumin,urine random 02/22/2023 0.57  MG/DL Final    No reference range has been established. Creatinine, urine random 02/22/2023 82.40  mg/dL Final    No reference range has been established. Microalbumin/Creat ratio (mg/g cre* 02/22/2023 7  0 - 30 mg/g Final    Hemoglobin A1c 02/22/2023 7.5 (A)  4.0 - 5.6 % Final    Comment: NEW METHOD  PLEASE NOTE NEW REFERENCE RANGE  (NOTE)  HbA1C Interpretive Ranges  <5.7              Normal  5.7 - 6.4         Consider Prediabetes  >6.5              Consider Diabetes      Est. average glucose 02/22/2023 169  mg/dL Final     Review of Systems   Constitutional:  Negative for activity change, fatigue and unexpected weight change. HENT:  Positive for sinus pain. Negative for congestion, hearing loss, rhinorrhea and sore throat. Eyes:  Negative for discharge. Respiratory:  Negative for cough, chest tightness and shortness of breath. Cardiovascular:  Negative for leg swelling. Gastrointestinal:  Negative for abdominal pain, constipation and diarrhea.    Genitourinary:  Negative for dysuria, flank pain, frequency and urgency. Musculoskeletal:  Negative for arthralgias, back pain and myalgias. Skin:  Negative for color change and rash. Neurological:  Positive for dizziness and headaches. Negative for light-headedness. Psychiatric/Behavioral:  Negative for dysphoric mood and sleep disturbance. The patient is not nervous/anxious. Visit Vitals  /84 (BP 1 Location: Left upper arm)   Pulse 93   Temp 97.5 °F (36.4 °C)   Resp 16   Ht 5' 4\" (1.626 m)   Wt 209 lb (94.8 kg)   SpO2 97%   BMI 35.87 kg/m²       Physical Exam  Vitals and nursing note reviewed. Constitutional:       General: She is not in acute distress. Appearance: Normal appearance. She is well-developed. She is not diaphoretic. HENT:      Right Ear: External ear normal.      Left Ear: External ear normal.      Nose:      Right Sinus: Maxillary sinus tenderness present. Left Sinus: Maxillary sinus tenderness present. Eyes:      General: No scleral icterus. Right eye: No discharge. Left eye: No discharge. Extraocular Movements: Extraocular movements intact. Conjunctiva/sclera: Conjunctivae normal.   Cardiovascular:      Rate and Rhythm: Normal rate and regular rhythm. Pulmonary:      Effort: Pulmonary effort is normal.      Breath sounds: Normal breath sounds. No wheezing. Abdominal:      General: Bowel sounds are normal.      Palpations: Abdomen is soft. Tenderness: There is no abdominal tenderness. Musculoskeletal:      Cervical back: Normal range of motion and neck supple. Lymphadenopathy:      Cervical: Cervical adenopathy present. Neurological:      Mental Status: She is alert and oriented to person, place, and time. Psychiatric:         Mood and Affect: Mood and affect normal.         I, Elaine Ferrer MD, personally performed the services described in this documentation as scribed by Mary Grace Chaney in my presence, and it is both accurate and complete.      An electronic signature was used to authenticate this note.   -- Lona Denny

## 2023-03-16 NOTE — PROGRESS NOTES
Results reviewed. 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 Let me know in 1 week if headache doesn`t improve.

## 2023-03-17 DIAGNOSIS — I10 UNCONTROLLED HYPERTENSION: ICD-10-CM

## 2023-03-17 RX ORDER — AMLODIPINE BESYLATE 5 MG/1
TABLET ORAL
Qty: 90 TABLET | Refills: 0 | Status: SHIPPED | OUTPATIENT
Start: 2023-03-17

## 2023-03-27 DIAGNOSIS — R51.9 SEVERE FRONTAL HEADACHES: Primary | ICD-10-CM

## 2023-03-27 DIAGNOSIS — H93.8X3 EAR FULLNESS, BILATERAL: ICD-10-CM

## 2023-03-29 ENCOUNTER — HOSPITAL ENCOUNTER (OUTPATIENT)
Dept: CT IMAGING | Age: 73
Discharge: HOME | End: 2023-03-29
Attending: INTERNAL MEDICINE
Payer: MEDICARE

## 2023-03-29 DIAGNOSIS — R51.9 SEVERE FRONTAL HEADACHES: ICD-10-CM

## 2023-03-29 PROCEDURE — 70470 CT HEAD/BRAIN W/O & W/DYE: CPT

## 2023-03-29 PROCEDURE — 74011000636 HC RX REV CODE- 636: Performed by: INTERNAL MEDICINE

## 2023-03-29 RX ADMIN — IOPAMIDOL 100 ML: 612 INJECTION, SOLUTION INTRAVENOUS at 11:00

## 2023-04-01 NOTE — PROGRESS NOTES
Results reviewed. Release via [facility] [address], your CT head came back normal. How is your headache?

## 2023-04-03 ENCOUNTER — PATIENT MESSAGE (OUTPATIENT)
Dept: PRIMARY CARE CLINIC | Age: 73
End: 2023-04-03

## 2023-04-03 DIAGNOSIS — R51.9 FREQUENT HEADACHES: Primary | ICD-10-CM

## 2023-04-03 PROBLEM — E11.9 TYPE 2 DIABETES MELLITUS (HCC): Status: RESOLVED | Noted: 2021-10-18 | Resolved: 2021-10-18

## 2023-04-03 RX ORDER — BUTALBITAL, ACETAMINOPHEN AND CAFFEINE 50; 325; 40 MG/1; MG/1; MG/1
1 TABLET ORAL 2 TIMES DAILY
Qty: 10 TABLET | Refills: 0 | Status: SHIPPED
Start: 2023-04-03 | End: 2023-04-04 | Stop reason: SDUPTHER

## 2023-04-04 RX ORDER — BUTALBITAL, ACETAMINOPHEN AND CAFFEINE 50; 325; 40 MG/1; MG/1; MG/1
1 TABLET ORAL 2 TIMES DAILY
Qty: 10 TABLET | Refills: 0 | Status: SHIPPED
Start: 2023-04-04 | End: 2023-04-09

## 2023-05-09 DIAGNOSIS — I10 ESSENTIAL (PRIMARY) HYPERTENSION: Primary | ICD-10-CM

## 2023-05-09 RX ORDER — LOSARTAN POTASSIUM AND HYDROCHLOROTHIAZIDE 25; 100 MG/1; MG/1
1 TABLET ORAL DAILY
Qty: 90 TABLET | Refills: 0 | Status: SHIPPED | OUTPATIENT
Start: 2023-05-09 | End: 2023-08-07

## 2023-05-19 DIAGNOSIS — G43.909 MIGRAINE WITHOUT STATUS MIGRAINOSUS, NOT INTRACTABLE, UNSPECIFIED MIGRAINE TYPE: Primary | ICD-10-CM

## 2023-05-19 DIAGNOSIS — G43.919 INTRACTABLE MIGRAINE WITHOUT STATUS MIGRAINOSUS, UNSPECIFIED MIGRAINE TYPE: ICD-10-CM

## 2023-05-19 RX ORDER — SUMATRIPTAN 50 MG/1
50 TABLET, FILM COATED ORAL
Qty: 9 TABLET | Refills: 2 | Status: SHIPPED | OUTPATIENT
Start: 2023-05-19 | End: 2023-05-19

## 2023-05-25 DIAGNOSIS — E78.2 MIXED HYPERLIPIDEMIA: Primary | ICD-10-CM

## 2023-05-25 DIAGNOSIS — E11.8 TYPE 2 DIABETES MELLITUS WITH UNSPECIFIED COMPLICATIONS (HCC): ICD-10-CM

## 2023-05-25 RX ORDER — EZETIMIBE 10 MG/1
10 TABLET ORAL DAILY
Qty: 90 TABLET | Refills: 0 | Status: SHIPPED | OUTPATIENT
Start: 2023-05-25

## 2023-05-28 RX ORDER — TOPIRAMATE 50 MG/1
TABLET, FILM COATED ORAL
Qty: 60 TABLET | Refills: 0 | Status: SHIPPED | OUTPATIENT
Start: 2023-05-28

## 2023-06-25 RX ORDER — TOPIRAMATE 50 MG/1
TABLET, FILM COATED ORAL
Qty: 120 TABLET | Refills: 0 | Status: SHIPPED | OUTPATIENT
Start: 2023-06-25

## 2023-07-05 DIAGNOSIS — E11.8 TYPE 2 DIABETES MELLITUS WITH UNSPECIFIED COMPLICATIONS (HCC): ICD-10-CM

## 2023-07-31 ENCOUNTER — TELEPHONE (OUTPATIENT)
Age: 73
End: 2023-07-31

## 2023-07-31 ENCOUNTER — OFFICE VISIT (OUTPATIENT)
Age: 73
End: 2023-07-31
Payer: MEDICARE

## 2023-07-31 VITALS
DIASTOLIC BLOOD PRESSURE: 80 MMHG | SYSTOLIC BLOOD PRESSURE: 130 MMHG | HEIGHT: 64 IN | TEMPERATURE: 97 F | HEART RATE: 107 BPM | BODY MASS INDEX: 35 KG/M2 | WEIGHT: 205 LBS | OXYGEN SATURATION: 96 %

## 2023-07-31 DIAGNOSIS — R51.9 INTRACTABLE HEADACHE, UNSPECIFIED CHRONICITY PATTERN, UNSPECIFIED HEADACHE TYPE: Primary | ICD-10-CM

## 2023-07-31 PROCEDURE — 3079F DIAST BP 80-89 MM HG: CPT | Performed by: PSYCHIATRY & NEUROLOGY

## 2023-07-31 PROCEDURE — G8417 CALC BMI ABV UP PARAM F/U: HCPCS | Performed by: PSYCHIATRY & NEUROLOGY

## 2023-07-31 PROCEDURE — 1090F PRES/ABSN URINE INCON ASSESS: CPT | Performed by: PSYCHIATRY & NEUROLOGY

## 2023-07-31 PROCEDURE — 3075F SYST BP GE 130 - 139MM HG: CPT | Performed by: PSYCHIATRY & NEUROLOGY

## 2023-07-31 PROCEDURE — 99204 OFFICE O/P NEW MOD 45 MIN: CPT | Performed by: PSYCHIATRY & NEUROLOGY

## 2023-07-31 PROCEDURE — G8428 CUR MEDS NOT DOCUMENT: HCPCS | Performed by: PSYCHIATRY & NEUROLOGY

## 2023-07-31 NOTE — PROGRESS NOTES
NEUROLOGY NEW PATIENT CONSULTATION      7/31/2023    RE: Jaycee Montero    REFERRED BY:  Ruby Barton MD    CHIEF COMPLAINT:  This is Jaycee Montero is a 67 y.o. female   who had concerns including New Patient and Migraine. HPI:     Since Feb 2023, patient noted daily headache R occipital area radiating to top of head, worse at night, sharp, 8/10, better when she sits up, lasting for 3-4 hrs    (+) migraines in your 20s and went away 50s. (+) nausea (+) vomiting (-) photophobia (-) phonophobia. Stress can be a trigger Tried Topamax 25 mg BID (caused nausea) Fioricet, Sumatriptan    CT head (3/28/23) . No evidence of acute or significant intracranial abnormality.     Review of Systems  (-) fever  (-) rash    All other systems reviewed and are negative    PMH  Past Medical History:   Diagnosis Date    Esophageal reflux 4/13/2010    Essential hypertension, benign 4/13/2010    Mixed hyperlipidemia 4/13/2010    Obesity, unspecified 4/13/2010    LUCY (obstructive sleep apnea) 08-    AHI: 5.6 per hour   On CPAP  Diabetes    Social Hx  Social History     Socioeconomic History    Marital status:    Tobacco Use    Smoking status: Never    Smokeless tobacco: Never   Substance and Sexual Activity    Alcohol use: No    Drug use: No     Social Determinants of Health     Financial Resource Strain: Unknown    Difficulty of Paying Living Expenses: Patient refused   Food Insecurity: Unknown    Worried About Running Out of Food in the Last Year: Patient refused    Ran Out of Food in the Last Year: Patient refused       Family Hx  Family History   Problem Relation Age of Onset    Cancer Sister     Cancer Brother         leukemia    Cancer Mother         breast   Migraines - mother    ALLERGIES  Allergies   Allergen Reactions    Nitrofurantoin Itching    Atenolol-Chlorthalidone Other (See Comments)     fatigue    Codeine Nausea Only     GI    Simvastatin Swelling       CURRENT MEDS  Current Outpatient Medications

## 2023-08-01 NOTE — TELEPHONE ENCOUNTER
RE:ONB    Contacted patient's insurance and spoke with Miranda Jarvis [place] codes [address]353,[id number] DOES  NOT [address] [facility].       Call ref# [id number]    Nurse notified

## 2023-08-01 NOTE — TELEPHONE ENCOUNTER
Called pt.  verified. Inform pt that I received a message from PA Specialist stating no PA is needed for the ONB.  Pt verbalizes understnding and is scheduled with Dr. Coleen Ellis for 23 at 11:20 AM.

## 2023-08-03 DIAGNOSIS — I10 ESSENTIAL (PRIMARY) HYPERTENSION: ICD-10-CM

## 2023-08-03 RX ORDER — LOSARTAN POTASSIUM AND HYDROCHLOROTHIAZIDE 25; 100 MG/1; MG/1
TABLET ORAL
Qty: 90 TABLET | Refills: 0 | Status: SHIPPED | OUTPATIENT
Start: 2023-08-03

## 2023-08-04 ENCOUNTER — PROCEDURE VISIT (OUTPATIENT)
Age: 73
End: 2023-08-04

## 2023-08-04 DIAGNOSIS — R51.9 INTRACTABLE HEADACHE, UNSPECIFIED CHRONICITY PATTERN, UNSPECIFIED HEADACHE TYPE: Primary | ICD-10-CM

## 2023-08-04 NOTE — PROGRESS NOTES
Occipital Nerve Block    Patient ID:  Blanca Last  [id number]  38 y.o.  1950      Currently has 7/10 R occipital pain. Ice pack somewhat helps. Occipital nerve blocks:  1 cc Methyl Prednisolone (40 mg) and 2 cc Marcaine 0.5%  injected 1 cc into each right greater and lesser Occipital Nerves and upper neck region (Total of 3 cc given). The patient tolerated the procedure well. [facility] [place]  OFFICE PROCEDURE PROGRESS NOTE        Chart reviewed for the following:   Elmo Ugalde MD, have reviewed the History, Physical and updated the Allergic reactions for Fei Chamorrostad performed immediately prior to start of procedure:   I, Ailyn Liao MD, have performed the following reviews on Blanca Last   prior to the start of the procedure:            * Patient was identified by name and date of birth   * Agreement on procedure being performed was verified  * Risks and Benefits explained to the patient  * Procedure site verified and marked as necessary  * Patient was positioned for comfort  * Consent was signed and verified       Date of procedure: 8/4/2023    Procedure performed by:  Ailyn Liao MD    Provider assisted by: Otho Bernheim, RN    Patient assisted by: spouse    How tolerated by patient: tolerated the procedure well with no complications    Post Procedural Pain Scale: 6 - Hurts Even More    Comments:   Follow up as needed              Ailyn Liao MD

## 2023-08-17 DIAGNOSIS — E11.8 TYPE 2 DIABETES MELLITUS WITH UNSPECIFIED COMPLICATIONS (HCC): ICD-10-CM

## 2023-08-17 NOTE — TELEPHONE ENCOUNTER
Requested Prescriptions     Pending Prescriptions Disp Refills    metFORMIN (GLUCOPHAGE) 500 MG tablet [Pharmacy Med Name: metFORMIN HCl Oral Tablet 500 MG] 180 tablet 0     Sig: Take 1 tablet by mouth 2 times daily (with meals)        Last Visit 3/15/23  Last Refill 7/5/23

## 2023-08-19 DIAGNOSIS — E78.2 MIXED HYPERLIPIDEMIA: ICD-10-CM

## 2023-08-20 RX ORDER — EZETIMIBE 10 MG/1
TABLET ORAL
Qty: 90 TABLET | Refills: 0 | Status: SHIPPED | OUTPATIENT
Start: 2023-08-20

## 2023-09-08 DIAGNOSIS — I10 ESSENTIAL HYPERTENSION, BENIGN: ICD-10-CM

## 2023-09-08 RX ORDER — AMLODIPINE BESYLATE 5 MG/1
TABLET ORAL
Qty: 90 TABLET | Refills: 0 | Status: SHIPPED | OUTPATIENT
Start: 2023-09-08

## 2023-10-08 DIAGNOSIS — E11.8 TYPE 2 DIABETES MELLITUS WITH UNSPECIFIED COMPLICATIONS (HCC): ICD-10-CM

## 2023-10-10 ENCOUNTER — PATIENT MESSAGE (OUTPATIENT)
Dept: PRIMARY CARE CLINIC | Facility: CLINIC | Age: 73
End: 2023-10-10

## 2023-10-10 DIAGNOSIS — E11.8 TYPE 2 DIABETES MELLITUS WITH UNSPECIFIED COMPLICATIONS (HCC): ICD-10-CM

## 2023-10-10 NOTE — TELEPHONE ENCOUNTER
From: Charlotte Ferris  To: Dr. Sunshine Baars: 10/10/2023 1:51 PM EDT  Subject: metformin script    I see the script has been approved, but [address] says they do not have it. Could you please send it again?

## 2023-10-23 NOTE — TELEPHONE ENCOUNTER
MEDICAL ONCOLOGY - ESTABLISHED PATIENT VISIT    Reason for visit: Esophageal adenocarcinoma    Best Contact Phone Number(s): [phone] (home)      Cancer/Stage/TNM:    Cancer Staging   Malignant neoplasm of gastroesophageal junction  Staging form: Esophagus - Adenocarcinoma, AJCC 8th Edition  - Clinical stage from 6/13/2023: Stage III (cT3, cN1, cM0, G2) - Signed by Sunday Jasso MD on 7/5/2023       Oncology History   Malignant neoplasm of gastroesophageal junction   6/13/2023 Cancer Staged    Staging form: Esophagus - Adenocarcinoma, AJCC 8th Edition  - Clinical stage from 6/13/2023: Stage III (cT3, cN1, cM0, G2)     6/23/2023 Initial Diagnosis    Malignant neoplasm of gastroesophageal junction     7/3/2023 Tumor Conference     [facility] UGI MULTIDISCIPLINARY TUMOR BOARD  PATIENT REVIEW FORM   ____________________________________________________________    CLINIC #: [id number]   DATE: 7/3/2023    DIAGNOSIS: esophageal CA    PRESENTER: Mitch    PATIENT SUMMARY:   This 63 y/o gentleman is a long time smoker with emphysema who had low dose screening lung CT in 4/2023 per his PCP. Given an abnormality on this scan, he had PET on 6/1/23 which identified large hypermetabolic mid esophageal mass with lymph nodes. He underwent EGD on 6/13/23 that found large fungating ulcerated mass with bleeding in middle third of esophagus to lower esophagus, 34-44cm. Pathology revealed moderately differentiated adenocarcinoma.   Staging PET reviewed - level 2 cervical lymph node with FDG uptake, nothing in lungs concerning  Staging EUS today per Dr. Duffy.   He has been evaluated by Dr. Vance in [place] and Dr. June.   He is scheduled to see rad onc, Dr. Jasso, Wednesday and IR for port placement on 7/12/23.     BOARD RECOMMENDATIONS:   Proceed with neoadj CRT, then restaging to consider surgical resection    CONSULT NEEDED:     [] Surgery    [] Hem/Onc    [] Rad/Onc    [] Dietary                 [] Social  Patient is requesting a call to discuss injection and possibly setup appointment for ONB. Please contact. Service    [] Psychology       [] AES  [] Radiology     Clinical Stage: Tumor 3 Node(s) 1 Metastasis 0      GROUP STAGE:  [] O    [] 1   [] II     [x] III   []IV  [] Local recurrence     [] Regional recurrence     [] Distant recurrence   Metastatic site(s): none         [x] [facility] Treatment Guidelines reviewed and care planned is consistent with guidelines.         (i.e., NCCN, NCI, PD, ACO, AUA, etc.)    PRESENTATION AT CANCER CONFERENCE:         [x] Prospective    [] Retrospective     [] Follow-Up         7/16/2023 - 8/8/2023 Chemotherapy    Treatment Summary   Plan Name: OP ESOPHAGEAL PACLITAXEL CARBOPLATIN WEEKLY  Treatment Goal: Control  Status: Inactive  Start Date: 7/16/2023  End Date: 8/8/2023  Provider: Delta June MD  Chemotherapy: CARBOplatin (PARAPLATIN) 195 mg in sodium chloride 0.9% 304.5 mL chemo infusion, 195 mg (100 % of original dose 193 mg), Intravenous, Clinic/HOD 1 time, 1 of 1 cycle  Dose modification:   (original dose 193 mg, Cycle 1)  Administration: 195 mg (7/18/2023), 210 mg (7/24/2023), 230 mg (7/31/2023), 210 mg (8/8/2023)  PACLitaxeL (TAXOL) 50 mg/m2 = 84 mg in sodium chloride 0.9% 250 mL chemo infusion, 50 mg/m2 = 84 mg, Intravenous, Clinic/HOD 1 time, 1 of 1 cycle  Administration: 84 mg (7/18/2023)     7/18/2023 - 8/17/2023 Radiation Therapy    Treating physician: Sunday Jasso    Course: C1 Thorax 2023    Treatment Site Ref. ID Energy Dose/Fx (Gy) #Fx Dose Correction (Gy) Total Dose (Gy) Start Date End Date Elapsed Days   IM Esophagus PTV_High 6X 1.8 23 / 23 0 41.4 7/18/2023 8/17/2023 30        11/6/2023 -  Chemotherapy    Treatment Summary   Plan Name: OP NIVOLUMAB 480 MG Q4W  Treatment Goal: Curative  Status: Active  Start Date: 11/6/2023 (Planned)  End Date: 9/9/2024 (Planned)  Provider: Delta June MD  Chemotherapy: [No matching medication found in this treatment plan]          Interim History:   62 y.o. male with esophageal adenocarcinoma who presents for  follow-up after completion of chemoradiation and esophagogastrectomy with Dr. Meyer on 10/4/23.  Still on continuous tube feeds 16 hours per day.  Taking clears now by mouth without any issues.  Has some soreness on R chest wall but denies pain.  Has normal bowel movements once daily.    ECOG status is 0. Presents with his wife today.     ROS:   Review of Systems   Constitutional:  Negative for chills, fever, malaise/fatigue and weight loss.   HENT:  Negative for sore throat.    Eyes:  Negative for blurred vision and pain.   Respiratory:  Negative for cough and shortness of breath.    Cardiovascular:  Positive for chest pain. Negative for leg swelling.   Gastrointestinal:  Negative for abdominal pain, constipation, diarrhea, heartburn, nausea and vomiting.   Genitourinary:  Negative for dysuria and frequency.   Musculoskeletal:  Negative for back pain, falls and myalgias.   Skin:  Negative for rash.   Neurological:  Negative for dizziness, weakness and headaches.   Endo/Heme/Allergies:  Does not bruise/bleed easily.   Psychiatric/Behavioral:  Negative for depression. The patient is not nervous/anxious.    All other systems reviewed and are negative.      Past Medical History:   Past Medical History:   Diagnosis Date    Arthritis     Cancer     COPD (chronic obstructive pulmonary disease)         Past Surgical History:   Past Surgical History:   Procedure Laterality Date    CERVICAL FUSION      COLONOSCOPY N/A 3/27/2018    Procedure: COLONOSCOPY;  Surgeon: Maria Teresa Morocho MD;  Location: [facility];  Service: Endoscopy;  Laterality: N/A;    COLONOSCOPY N/A 6/13/2023    Procedure: COLONOSCOPY;  Surgeon: Kelli Snell MD;  Location: [facility];  Service: Endoscopy;  Laterality: N/A;    ENDOSCOPIC ULTRASOUND OF UPPER GASTROINTESTINAL TRACT N/A 7/3/2023    Procedure: ULTRASOUND, UPPER GI TRACT, ENDOSCOPIC;  Surgeon: Ray Duffy MD;  Location: [facility];  Service: Endoscopy;  Laterality: N/A;     ESOPHAGOGASTRODUODENOSCOPY N/A 6/13/2023    Procedure: EGD (ESOPHAGOGASTRODUODENOSCOPY);  Surgeon: Kelli Snell MD;  Location: [facility] ENDO;  Service: Endoscopy;  Laterality: N/A;    ESOPHAGOGASTRODUODENOSCOPY N/A 7/3/2023    Procedure: EGD (ESOPHAGOGASTRODUODENOSCOPY);  Surgeon: Ray Duffy MD;  Location: [facility] ENDO;  Service: Endoscopy;  Laterality: N/A;    INGUINAL HERNIA REPAIR Bilateral     Right x2, x1 left    PLACEMENT OF JEJUNOSTOMY TUBE  10/4/2023    Procedure: INSERTION, JEJUNOSTOMY TUBE;  Surgeon: Nas Meyer MD;  [facility];  Service: General;;    PYLOROMYOTOMY  10/4/2023    Procedure: PYLOROMYOTOMY;  Surgeon: Nas Meyer MD;  Location[facility] OR [address] [place] [address];  Service: General;;    ROBOT-ASSISTED SURGICAL REMOVAL OF ESOPHAGUS USING DA BALWINDER XI N/A 10/4/2023    Procedure: XI ROBOTIC ESOPHAGECTOMY;  Surgeon: Nas Meyer MD;  [facility];  Service: General;  Laterality: N/A;    ROBOTIC REPAIR, HERNIA, PARAESOPHAGEAL  10/4/2023    Procedure: ROBOTIC REPAIR, HERNIA, PARAESOPHAGEAL;  Surgeon: Nas Meyer MD;  Location: [facility] OR [address] [place] [address];  Service: General;;    ROTATOR CUFF REPAIR Right     x2        Family History:   Family History   Problem Relation Age of Onset    Diabetes Mother     Heart disease Father         CHF    Glaucoma Neg Hx     Colon cancer Neg Hx     Prostate cancer Neg Hx         Social History:   Social History     Tobacco Use    Smoking status: Former     Current packs/day: 0.25     Average packs/day: 0.3 packs/day for 40.8 years (10.2 ttl pk-yrs)     Types: Cigarettes     Start date: 1983     Passive exposure: Current    Smokeless tobacco: Never    Tobacco comments:     4 cigarettes a day   Substance Use Topics    Alcohol use: Yes     Comment: a couple of beers a day        I have reviewed and updated the patient's past medical, surgical, family and social histories.    Allergies:   Review of patient's allergies indicates:  No Known  "Allergies     Medications:   Current Outpatient Medications   Medication Sig Dispense Refill    enoxaparin (LOVENOX) 40 mg/0.4 mL Syrg Inject 0.4 mLs (40 mg total) into the skin every 24 hours (prophylaxis, 5pm) for 28 days. 11.2 mL 0    pantoprazole (PROTONIX) 40 MG tablet Take 1 tablet (40 mg total) by mouth once daily. 90 tablet 3    cholecalciferol, vitamin D3, (VITAMIN D3) 10 mcg (400 unit) Tab Take by mouth once daily.      LIDOcaine-prilocaine (EMLA) cream Apply topically as needed (Port access). (Patient not taking: Reported on 7/31/2023) 30 g 1    oxyCODONE (ROXICODONE) 5 mg/5 mL Soln 5 mLs (5 mg total) by Per J Tube route every 4 (four) hours as needed. (Patient not taking: Reported on 10/11/2023) 210 mL 0    sildenafil (VIAGRA) 100 MG tablet Take 1 tablet (100 mg total) by mouth daily as needed for Erectile Dysfunction. (Patient not taking: Reported on 10/11/2023) 10 tablet 6     No current facility-administered medications for this visit.        Physical Exam:   /72 (BP Location: Right arm, Patient Position: Sitting, BP Method: Medium (Automatic))   Pulse 65   Temp 97.8 °F (36.6 °C) (Oral)   Resp 18   Ht 5' 9" (1.753 m)   Wt 59.3 kg (130 lb 11.7 oz)   SpO2 99%   BMI 19.31 kg/m²      ECOG Performance status: 0            Physical Exam  Vitals reviewed.   Constitutional:       General: He is not in acute distress.     Appearance: Normal appearance. He is normal weight.   HENT:      Head: Normocephalic and atraumatic.      Right Ear: External ear normal.      Left Ear: External ear normal.      Nose: Nose normal.      Mouth/Throat:      Mouth: Mucous membranes are moist.      Pharynx: Oropharynx is clear. No posterior oropharyngeal erythema.   Eyes:      General: No scleral icterus.     Extraocular Movements: Extraocular movements intact.      Conjunctiva/sclera: Conjunctivae normal.      Pupils: Pupils are equal, round, and reactive to light.   Cardiovascular:      Rate and Rhythm: Normal rate " and regular rhythm.      Pulses: Normal pulses.      Heart sounds: Normal heart sounds.   Pulmonary:      Effort: Pulmonary effort is normal.      Breath sounds: Normal breath sounds. No wheezing or rales.   Abdominal:      General: Bowel sounds are normal. There is no distension.      Palpations: Abdomen is soft.      Tenderness: There is no abdominal tenderness.      Comments: J tube in place;  Surgical wounds well healing   Musculoskeletal:         General: No swelling. Normal range of motion.      Cervical back: Normal range of motion and neck supple.   Skin:     General: Skin is warm.      Coloration: Skin is not jaundiced.      Findings: No erythema or rash.   Neurological:      General: No focal deficit present.      Mental Status: He is alert and oriented to person, place, and time. Mental status is at baseline.      Gait: Gait normal.   Psychiatric:         Mood and Affect: Mood normal.         Behavior: Behavior normal.         Thought Content: Thought content normal.         Judgment: Judgment normal.           Labs:   Recent Results (from the past 48 hour(s))   CBC Oncology    Collection Time: 10/23/23  8:56 AM   Result Value Ref Range    WBC 6.48 3.90 - 12.70 K/uL    RBC 3.35 (L) 4.60 - 6.20 M/uL    Hemoglobin 11.4 (L) 14.0 - 18.0 g/dL    Hematocrit 34.6 (L) 40.0 - 54.0 %     (H) 82 - 98 fL    MCH 34.0 (H) 27.0 - 31.0 pg    MCHC 32.9 32.0 - 36.0 g/dL    RDW 12.9 11.5 - 14.5 %    Platelets 335 150 - 450 K/uL    MPV 9.1 (L) 9.2 - 12.9 fL    Gran # (ANC) 4.2 1.8 - 7.7 K/uL    Immature Grans (Abs) 0.06 (H) 0.00 - 0.04 K/uL   Comprehensive Metabolic Panel    Collection Time: 10/23/23  8:56 AM   Result Value Ref Range    Sodium 139 136 - 145 mmol/L    Potassium 4.6 3.5 - 5.1 mmol/L    Chloride 105 95 - 110 mmol/L    CO2 27 23 - 29 mmol/L    Glucose 95 70 - 110 mg/dL    BUN 16 8 - 23 mg/dL    Creatinine 0.8 0.5 - 1.4 mg/dL    Calcium 9.8 8.7 - 10.5 mg/dL    Total Protein 7.6 6.0 - 8.4 g/dL    Albumin  3.6 3.5 - 5.2 g/dL    Total Bilirubin 0.2 0.1 - 1.0 mg/dL    Alkaline Phosphatase 159 (H) 55 - 135 U/L    AST 18 10 - 40 U/L    ALT 19 10 - 44 U/L    eGFR >60.0 >60 mL/min/1.73 m^2    Anion Gap 7 (L) 8 - 16 mmol/L        I have reviewed the pertinent labs. Mild anemia, improved leukopenia and thrombocytopenia. Normal CMP.    Imaging:       PET/CT 9/22/23:    Impression:     1. In this patient with biopsy-proven esophageal adenocarcinoma, there is persistent circumferential soft tissue thickening in the distal esophagus likely extending into the proximal stomach with interval decreased hypermetabolic activity.  Index cervical and AP window lymph nodes are decreased in size/uptake.  No new lymphadenopathy or suspicious lesions elsewhere.  2. Additional stable findings as above.    Path:   2. Gastroesophageal junction mass (biopsy):   Invasive adenocarcinoma, moderately differentiated   Background low and high-grade glandular dysplasia   HER2 immunohistochemistry:  Equivocal.     Immunohistochemistry (IHC) Testing for Mismatch Repair (MMR) Proteins:   MLH1, MSH2, MSH6, PMS2 - Intact nuclear expression   Background nonneoplastic tissue/internal control with intact nuclear expression     There are exceptions to the above IHC interpretations. These results should not be considered in isolation, and clinical correlation with genetic counseling is recommended to assess the need for germline testing.     Interpretation: No loss of nuclear expression of MMR proteins: low probability of microsatellite instability       Assessment:       1. Malignant neoplasm of gastroesophageal junction    2. Chemotherapy induced neutropenia    3. Chemotherapy-induced thrombocytopenia    4. Esophagitis    5. Pulmonary emphysema, unspecified emphysema type    6. Nicotine dependence, cigarettes, uncomplicated    7. Atherosclerosis of aorta    8. Moderate malnutrition                Plan:             # Esophageal adenocarcinoma, chemotherapy  induced neutropenia/thrombocytopenia  He initially presented with a locally advanced adenocarcinoma of the middle and lower third of the esophagus, pMMR. PET/CT on 6/1/23 did not show clear distant metastatic disease.  We discussed the case and plan with Dr. Meyer and he feels the patient is surgically resectable as well.    Began cycle 1 on 7/18/23. He unfortunately had a reaction to the Taxol. During the Taxol infusion, he developed coughing, flushing, chest tightness and nausea. O2 sat was 92%, 2L O2 applied NC. We were notified and stopped the Taxol. He was able to proceed with the Carboplatin without complication.   We switched him to Abraxane 40 mg/m2 with week 2.  This was tolerated very well without issue. Has tolerated RT well and has completed 4 cycles of chemoRT. He completed radiation 8/18/23.  We did have to forego his last dose of chemotherapy due to cytopenias.    PET/CT on 9/22/23 showed very nice response to treatment with reduction in SUV avidity of primary tumor.  Discussed with Dr. Meyer.      He underwent esophagogastrectomy with Dr. Meyer on 10/4/23.  Pathology showed ypT2N0 with negative margins and 18 negative lymph nodes.    Because of his residual disease, I have recommended adjuvant nivolumab per [id number].  Will request PD-L1 testing but proceed with q4week nivolumab 480 mg regardless.  He is in agreement.    # Esophagitis  Improved.  Secondary to chemo and radiation.    # COPD, tobacco abuse, aortic atherosclerosis  Counseled on tobacco cessation.  He has stopped smoking.  Normal SpO2.  No dyspnea.  Atherosclerosis noted on imaging.    # Malnutrition  Following with surgery team as he will transition ultimately off tube feeds.  Weight up 5 pounds today.    The above information has been reviewed with the patient and all questions have been answered to their apparent satisfaction.  They understand that they can call the clinic with any questions.      Route Chart for  Scheduling    Med Onc Chart Routing      Follow up with physician . In 7 weeks for cycle 2 of nivolumab   Follow up with [facility] 3 weeks. for cycle 1 of nivolumab   Infusion scheduling note    Injection scheduling note    Labs CBC, CMP and TSH   Scheduling:  Preferred lab:  Lab interval: every 4 weeks     Imaging    Pharmacy appointment    Other referrals                  Treatment Plan Information   OP NIVOLUMAB 480 MG Q4W   Delta June MD   Upcoming Treatment Dates - OP NIVOLUMAB 480 MG Q4W    11/6/2023       Chemotherapy       nivolumab 480 mg in sodium chloride 0.9% 98 mL infusion  12/4/2023       Chemotherapy       nivolumab 480 mg in sodium chloride 0.9% 98 mL infusion  1/1/2024       Chemotherapy       nivolumab 480 mg in sodium chloride 0.9% 98 mL infusion  1/29/2024       Chemotherapy       nivolumab 480 mg in sodium chloride 0.9% 98 mL infusion

## 2023-10-28 DIAGNOSIS — I10 ESSENTIAL (PRIMARY) HYPERTENSION: ICD-10-CM

## 2023-10-29 RX ORDER — LOSARTAN POTASSIUM AND HYDROCHLOROTHIAZIDE 25; 100 MG/1; MG/1
TABLET ORAL
Qty: 90 TABLET | Refills: 0 | Status: SHIPPED | OUTPATIENT
Start: 2023-10-29

## 2023-10-31 ENCOUNTER — OFFICE VISIT (OUTPATIENT)
Dept: PRIMARY CARE CLINIC | Facility: CLINIC | Age: 73
End: 2023-10-31
Payer: MEDICARE

## 2023-10-31 VITALS
DIASTOLIC BLOOD PRESSURE: 85 MMHG | HEART RATE: 86 BPM | WEIGHT: 206.6 LBS | BODY MASS INDEX: 35.27 KG/M2 | SYSTOLIC BLOOD PRESSURE: 135 MMHG | OXYGEN SATURATION: 100 % | TEMPERATURE: 97.8 F | RESPIRATION RATE: 17 BRPM | HEIGHT: 64 IN

## 2023-10-31 DIAGNOSIS — N39.498 OTHER URINARY INCONTINENCE: ICD-10-CM

## 2023-10-31 DIAGNOSIS — E11.9 TYPE 2 DIABETES MELLITUS WITHOUT COMPLICATION, WITHOUT LONG-TERM CURRENT USE OF INSULIN (HCC): ICD-10-CM

## 2023-10-31 DIAGNOSIS — N39.0 URINARY TRACT INFECTION WITHOUT HEMATURIA, SITE UNSPECIFIED: Primary | ICD-10-CM

## 2023-10-31 DIAGNOSIS — M54.81 OCCIPITAL NEURALGIA OF RIGHT SIDE: ICD-10-CM

## 2023-10-31 DIAGNOSIS — G47.33 OSA ON CPAP: ICD-10-CM

## 2023-10-31 LAB
ALBUMIN SERPL-MCNC: 4.1 G/DL (ref 3.5–5)
ALBUMIN/GLOB SERPL: 1.2 (ref 1.1–2.2)
ALP SERPL-CCNC: 61 U/L (ref 45–117)
ALT SERPL-CCNC: 23 U/L (ref 12–78)
ANION GAP SERPL CALC-SCNC: 6 MMOL/L (ref 5–15)
AST SERPL-CCNC: 11 U/L (ref 15–37)
BILIRUB SERPL-MCNC: 0.6 MG/DL (ref 0.2–1)
BILIRUBIN, URINE, POC: NEGATIVE
BLOOD URINE, POC: NEGATIVE
BUN SERPL-MCNC: 21 MG/DL (ref 6–20)
BUN/CREAT SERPL: 26 (ref 12–20)
CALCIUM SERPL-MCNC: 10 MG/DL (ref 8.5–10.1)
CHLORIDE SERPL-SCNC: 103 MMOL/L (ref 97–108)
CO2 SERPL-SCNC: 28 MMOL/L (ref 21–32)
CREAT SERPL-MCNC: 0.8 MG/DL (ref 0.55–1.02)
ERYTHROCYTE [DISTWIDTH] IN BLOOD BY AUTOMATED COUNT: 12 % (ref 11.5–14.5)
EST. AVERAGE GLUCOSE BLD GHB EST-MCNC: 131 MG/DL
GLOBULIN SER CALC-MCNC: 3.3 G/DL (ref 2–4)
GLUCOSE SERPL-MCNC: 113 MG/DL (ref 65–100)
GLUCOSE URINE, POC: NEGATIVE
HBA1C MFR BLD: 6.2 % (ref 4–5.6)
HCT VFR BLD AUTO: 40.7 % (ref 35–47)
HGB BLD-MCNC: 13.5 G/DL (ref 11.5–16)
KETONES, URINE, POC: NEGATIVE
LEUKOCYTE ESTERASE, URINE, POC: NEGATIVE
MCH RBC QN AUTO: 31.3 PG (ref 26–34)
MCHC RBC AUTO-ENTMCNC: 33.2 G/DL (ref 30–36.5)
MCV RBC AUTO: 94.2 FL (ref 80–99)
NITRITE, URINE, POC: NEGATIVE
NRBC # BLD: 0 K/UL (ref 0–0.01)
NRBC BLD-RTO: 0 PER 100 WBC
PH, URINE, POC: 5 (ref 4.6–8)
PLATELET # BLD AUTO: 387 K/UL (ref 150–400)
PMV BLD AUTO: 10.7 FL (ref 8.9–12.9)
POTASSIUM SERPL-SCNC: 4 MMOL/L (ref 3.5–5.1)
PROT SERPL-MCNC: 7.4 G/DL (ref 6.4–8.2)
PROTEIN,URINE, POC: NEGATIVE
RBC # BLD AUTO: 4.32 M/UL (ref 3.8–5.2)
SODIUM SERPL-SCNC: 137 MMOL/L (ref 136–145)
SPECIFIC GRAVITY, URINE, POC: 1.02 (ref 1–1.03)
URINALYSIS CLARITY, POC: CLEAR
URINALYSIS COLOR, POC: YELLOW
UROBILINOGEN, POC: NORMAL
WBC # BLD AUTO: 7.4 K/UL (ref 3.6–11)

## 2023-10-31 PROCEDURE — G8484 FLU IMMUNIZE NO ADMIN: HCPCS | Performed by: INTERNAL MEDICINE

## 2023-10-31 PROCEDURE — 0509F URINE INCON PLAN DOCD: CPT | Performed by: INTERNAL MEDICINE

## 2023-10-31 PROCEDURE — G8417 CALC BMI ABV UP PARAM F/U: HCPCS | Performed by: INTERNAL MEDICINE

## 2023-10-31 PROCEDURE — 2022F DILAT RTA XM EVC RTNOPTHY: CPT | Performed by: INTERNAL MEDICINE

## 2023-10-31 PROCEDURE — 1123F ACP DISCUSS/DSCN MKR DOCD: CPT | Performed by: INTERNAL MEDICINE

## 2023-10-31 PROCEDURE — G8400 PT W/DXA NO RESULTS DOC: HCPCS | Performed by: INTERNAL MEDICINE

## 2023-10-31 PROCEDURE — 1036F TOBACCO NON-USER: CPT | Performed by: INTERNAL MEDICINE

## 2023-10-31 PROCEDURE — 1090F PRES/ABSN URINE INCON ASSESS: CPT | Performed by: INTERNAL MEDICINE

## 2023-10-31 PROCEDURE — G8427 DOCREV CUR MEDS BY ELIG CLIN: HCPCS | Performed by: INTERNAL MEDICINE

## 2023-10-31 PROCEDURE — 3051F HG A1C>EQUAL 7.0%<8.0%: CPT | Performed by: INTERNAL MEDICINE

## 2023-10-31 PROCEDURE — 3017F COLORECTAL CA SCREEN DOC REV: CPT | Performed by: INTERNAL MEDICINE

## 2023-10-31 PROCEDURE — 3075F SYST BP GE 130 - 139MM HG: CPT | Performed by: INTERNAL MEDICINE

## 2023-10-31 PROCEDURE — 3079F DIAST BP 80-89 MM HG: CPT | Performed by: INTERNAL MEDICINE

## 2023-10-31 PROCEDURE — 81001 URINALYSIS AUTO W/SCOPE: CPT | Performed by: INTERNAL MEDICINE

## 2023-10-31 PROCEDURE — 99214 OFFICE O/P EST MOD 30 MIN: CPT | Performed by: INTERNAL MEDICINE

## 2023-10-31 RX ORDER — OXYBUTYNIN CHLORIDE 5 MG/1
5 TABLET ORAL 2 TIMES DAILY
Qty: 60 TABLET | Refills: 0 | Status: SHIPPED | OUTPATIENT
Start: 2023-10-31 | End: 2023-11-30

## 2023-10-31 SDOH — ECONOMIC STABILITY: FOOD INSECURITY: WITHIN THE PAST 12 MONTHS, THE FOOD YOU BOUGHT JUST DIDN'T LAST AND YOU DIDN'T HAVE MONEY TO GET MORE.: PATIENT DECLINED

## 2023-10-31 SDOH — ECONOMIC STABILITY: FOOD INSECURITY: WITHIN THE PAST 12 MONTHS, YOU WORRIED THAT YOUR FOOD WOULD RUN OUT BEFORE YOU GOT MONEY TO BUY MORE.: PATIENT DECLINED

## 2023-10-31 SDOH — ECONOMIC STABILITY: HOUSING INSECURITY
IN THE LAST 12 MONTHS, WAS THERE A TIME WHEN YOU DID NOT HAVE A STEADY PLACE TO SLEEP OR SLEPT IN A SHELTER (INCLUDING NOW)?: PATIENT REFUSED

## 2023-10-31 SDOH — ECONOMIC STABILITY: INCOME INSECURITY: HOW HARD IS IT FOR YOU TO PAY FOR THE VERY BASICS LIKE FOOD, HOUSING, MEDICAL CARE, AND HEATING?: PATIENT DECLINED

## 2023-10-31 ASSESSMENT — PATIENT HEALTH QUESTIONNAIRE - PHQ9
SUM OF ALL RESPONSES TO PHQ QUESTIONS 1-9: 0
SUM OF ALL RESPONSES TO PHQ QUESTIONS 1-9: 0
2. FEELING DOWN, DEPRESSED OR HOPELESS: 0
SUM OF ALL RESPONSES TO PHQ QUESTIONS 1-9: 0
SUM OF ALL RESPONSES TO PHQ QUESTIONS 1-9: 0
1. LITTLE INTEREST OR PLEASURE IN DOING THINGS: 0
SUM OF ALL RESPONSES TO PHQ9 QUESTIONS 1 & 2: 0

## 2023-10-31 ASSESSMENT — ENCOUNTER SYMPTOMS
DIARRHEA: 0
ABDOMINAL PAIN: 0
EYE DISCHARGE: 0
CHEST TIGHTNESS: 0
COLOR CHANGE: 0
COUGH: 0
SORE THROAT: 0
CONSTIPATION: 0
SHORTNESS OF BREATH: 0
BACK PAIN: 0
RHINORRHEA: 0

## 2023-10-31 NOTE — PROGRESS NOTES
Trever Jaramillo (:  1950) is a 67 y.o. female, Established patient, here for evaluation of the following chief complaint(s):  Urinary Tract Infection (UTI 3 weeks ago, 2 weeks ago they gave medication)           ASSESSMENT/PLAN:  1. Urinary tract infection without hematuria, site unspecified  -     AMB POC URINALYSIS DIP STICK AUTO W/ MICRO  I ordered a urine sample which was normal.  I recommend that she stay well hydrated. 2. Other urinary incontinence  -     oxybutynin (DITROPAN) 5 MG tablet; Take 1 tablet by mouth 2 times daily, Disp-60 tablet, R-0Normal sent to pharmacy. I prescribed oxybutynin 5 mg to be taken at night. Potential side effects were discussed. 3. Occipital neuralgia of right side  She is followed by Neurology and plans to have nerve blocks every 6 months. 4. Type 2 diabetes mellitus without complication, without long-term current use of insulin (HCC)  -     CBC; Future  -     Comprehensive Metabolic Panel; Future  -     Hemoglobin A1C; Future  I ordered a CBC, CMP, and blood work to check her Hgb a1c. I recommend that she continue taking metformin 1000 mg BID.    5. LUCY on CPAP  I recommend that she continue using her CPAP. Subjective   SUBJECTIVE/OBJECTIVE:  HPI    Patient presents today for UTI and a follow up for chronic conditions. She states that 3 weeks ago, she started having urinary urgency, and dysuria. She went to [facility] ad put on an antibiotic. She reports having urgency that is still present. She has some incontinence at night. She reports that she is not checking her BG levels. She urinate twice at night and her pajamas are damp in the morning. She denies dysuria. She reports that she had a nerve block, which has improved her headaches. She is followed by Neurology for occipital neuralgia and plans to have repeat nerve blocks every 6 months. She is no longer taking Topamax.     She is taking amlodipine 5 mg daily losartan-HCTZ 100

## 2023-11-03 DIAGNOSIS — E11.8 TYPE II DIABETES MELLITUS WITH COMPLICATION (HCC): Primary | ICD-10-CM

## 2023-12-03 DIAGNOSIS — E78.2 MIXED HYPERLIPIDEMIA: ICD-10-CM

## 2023-12-03 RX ORDER — EZETIMIBE 10 MG/1
TABLET ORAL
Qty: 90 TABLET | Refills: 0 | Status: SHIPPED | OUTPATIENT
Start: 2023-12-03

## 2023-12-26 DIAGNOSIS — N39.498 OTHER URINARY INCONTINENCE: ICD-10-CM

## 2023-12-26 RX ORDER — OXYBUTYNIN CHLORIDE 5 MG/1
5 TABLET ORAL 2 TIMES DAILY
Qty: 60 TABLET | Refills: 0 | Status: SHIPPED | OUTPATIENT
Start: 2023-12-26

## 2023-12-31 DIAGNOSIS — I10 ESSENTIAL HYPERTENSION, BENIGN: ICD-10-CM

## 2023-12-31 RX ORDER — AMLODIPINE BESYLATE 5 MG/1
TABLET ORAL
Qty: 90 TABLET | Refills: 0 | Status: SHIPPED | OUTPATIENT
Start: 2023-12-31

## 2024-02-07 ENCOUNTER — ENROLLMENT (OUTPATIENT)
Dept: PHARMACY | Facility: CLINIC | Age: 74
End: 2024-02-07

## 2024-02-12 ENCOUNTER — TELEPHONE (OUTPATIENT)
Age: 74
End: 2024-02-12

## 2024-02-15 ENCOUNTER — OFFICE VISIT (OUTPATIENT)
Age: 74
End: 2024-02-15

## 2024-02-15 VITALS
BODY MASS INDEX: 35.46 KG/M2 | TEMPERATURE: 96.6 F | SYSTOLIC BLOOD PRESSURE: 130 MMHG | HEART RATE: 86 BPM | OXYGEN SATURATION: 96 % | HEIGHT: 64 IN | DIASTOLIC BLOOD PRESSURE: 80 MMHG

## 2024-02-15 DIAGNOSIS — R51.9 INTRACTABLE HEADACHE, UNSPECIFIED CHRONICITY PATTERN, UNSPECIFIED HEADACHE TYPE: Primary | ICD-10-CM

## 2024-02-15 DIAGNOSIS — E66.01 SEVERE OBESITY (BMI 35.0-39.9) WITH COMORBIDITY (HCC): ICD-10-CM

## 2024-02-15 NOTE — PROGRESS NOTES
normal speech.  Oriented to all spheres.  No visual field defect on confrontation exam.  Full eyes movement, with no nystagmus, no diplopia, no ptosis.  Normal gag and swallow.  All remaining cranial nerves were normal  Motor function: 5/5 in all extremities  Sensory: intact to LT, PP and JPS  DTRs ++ in all extremities, (-) Babinski  Good FTN and HTS   Gait: Normal    Assessment:       ICD-10-CM    1. Intractable headache, unspecified chronicity pattern, unspecified headache type  R51.9       2. Severe obesity (BMI 35.0-39.9) with comorbidity (HCC)  E66.01             Considerations include bilateral occipital neuralgia, R worse than L and cervicogenic headache       Plan:     1. I had a long discussion with patient and . Discussed diagnosis, pathophysiology prognosis, available treatment and formulating plan. Reviewed test results. All questions were answered.  2. Will do bilateral occipital nerve blocks (see below note)  3. Advise ice pack for 15 mins prn   4. Also applies arthritis cream in the area  5. Depending on above, will consider Gabapentin       Occipital Nerve Block    Patient ID:  Nereyda Rivas  [id number]  73 y.o.  1950      Occipital nerve blocks:  1 cc Methyl Prednisolone (40 mg) and 2 cc Marcaine 0.5%  injected 1 cc into each right and left greater and lesser Occipital Nerves and upper neck region (Total of 6 cc given). The patient tolerated the procedure well.       [facility] [place]  OFFICE PROCEDURE PROGRESS NOTE        Chart reviewed for the following:   Naveed BACON MD, have reviewed the History, Physical and updated the Allergic reactions for Nereyda Rivas     TIME OUT performed immediately prior to start of procedure:   Naveed BACON MD, have performed the following reviews on Nereyda Rivas   prior to the start of the procedure:            * Patient was identified by name and date of birth   * Agreement on procedure being performed was verified  * Risks and

## 2024-02-18 DIAGNOSIS — E11.8 TYPE II DIABETES MELLITUS WITH COMPLICATION (HCC): ICD-10-CM

## 2024-02-27 DIAGNOSIS — N39.498 OTHER URINARY INCONTINENCE: ICD-10-CM

## 2024-02-27 DIAGNOSIS — I10 ESSENTIAL (PRIMARY) HYPERTENSION: ICD-10-CM

## 2024-02-27 RX ORDER — LOSARTAN POTASSIUM AND HYDROCHLOROTHIAZIDE 25; 100 MG/1; MG/1
1 TABLET ORAL DAILY
Qty: 90 TABLET | Refills: 0 | Status: SHIPPED | OUTPATIENT
Start: 2024-02-27

## 2024-02-27 RX ORDER — OXYBUTYNIN CHLORIDE 5 MG/1
5 TABLET ORAL 2 TIMES DAILY
Qty: 60 TABLET | Refills: 0 | Status: SHIPPED | OUTPATIENT
Start: 2024-02-27

## 2024-03-03 DIAGNOSIS — E78.2 MIXED HYPERLIPIDEMIA: ICD-10-CM

## 2024-03-03 RX ORDER — EZETIMIBE 10 MG/1
10 TABLET ORAL DAILY
Qty: 90 TABLET | Refills: 0 | Status: SHIPPED | OUTPATIENT
Start: 2024-03-03

## 2024-03-24 DIAGNOSIS — N39.498 OTHER URINARY INCONTINENCE: ICD-10-CM

## 2024-03-24 RX ORDER — OXYBUTYNIN CHLORIDE 5 MG/1
5 TABLET ORAL 2 TIMES DAILY
Qty: 60 TABLET | Refills: 1 | Status: SHIPPED | OUTPATIENT
Start: 2024-03-24

## 2024-03-28 DIAGNOSIS — I10 ESSENTIAL HYPERTENSION, BENIGN: ICD-10-CM

## 2024-03-28 RX ORDER — AMLODIPINE BESYLATE 5 MG/1
TABLET ORAL
Qty: 90 TABLET | Refills: 0 | Status: SHIPPED | OUTPATIENT
Start: 2024-03-28

## 2024-04-25 ENCOUNTER — COMMUNITY OUTREACH (OUTPATIENT)
Dept: PRIMARY CARE CLINIC | Facility: CLINIC | Age: 74
End: 2024-04-25

## 2024-05-19 DIAGNOSIS — E11.8 TYPE II DIABETES MELLITUS WITH COMPLICATION (HCC): ICD-10-CM

## 2024-05-23 DIAGNOSIS — I10 ESSENTIAL (PRIMARY) HYPERTENSION: ICD-10-CM

## 2024-05-23 RX ORDER — LOSARTAN POTASSIUM AND HYDROCHLOROTHIAZIDE 25; 100 MG/1; MG/1
1 TABLET ORAL DAILY
Qty: 90 TABLET | Refills: 0 | Status: SHIPPED | OUTPATIENT
Start: 2024-05-23

## 2024-05-26 DIAGNOSIS — N39.498 OTHER URINARY INCONTINENCE: ICD-10-CM

## 2024-05-26 DIAGNOSIS — E78.2 MIXED HYPERLIPIDEMIA: ICD-10-CM

## 2024-05-26 RX ORDER — OXYBUTYNIN CHLORIDE 5 MG/1
5 TABLET ORAL 2 TIMES DAILY
Qty: 60 TABLET | Refills: 1 | Status: SHIPPED | OUTPATIENT
Start: 2024-05-26

## 2024-05-26 RX ORDER — EZETIMIBE 10 MG/1
10 TABLET ORAL DAILY
Qty: 90 TABLET | Refills: 0 | Status: SHIPPED | OUTPATIENT
Start: 2024-05-26

## 2024-06-22 DIAGNOSIS — I10 ESSENTIAL HYPERTENSION, BENIGN: ICD-10-CM

## 2024-06-23 RX ORDER — AMLODIPINE BESYLATE 5 MG/1
TABLET ORAL
Qty: 90 TABLET | Refills: 0 | Status: SHIPPED | OUTPATIENT
Start: 2024-06-23

## 2024-07-17 DIAGNOSIS — N39.498 OTHER URINARY INCONTINENCE: ICD-10-CM

## 2024-07-17 RX ORDER — OXYBUTYNIN CHLORIDE 5 MG/1
5 TABLET ORAL 2 TIMES DAILY
Qty: 60 TABLET | Refills: 0 | Status: SHIPPED | OUTPATIENT
Start: 2024-07-17

## 2024-07-17 NOTE — PROGRESS NOTES
Neurology Progress Note    Patient ID:  Nereyda Rivas  [id number]  73 y.o.  1950      Subjective:   History:  Nereyda Rivas is a female who  has a past medical history of Esophageal reflux, Essential hypertension, benign, Mixed hyperlipidemia, Obesity, unspecified, and LUCY (obstructive sleep apnea). Who since Feb 2023, noted daily headache R occipital area radiating to top of head, worse at night, sharp, 8/10, better when she sits up, lasting for 3-4 hrs (+) migraines in your 20s and went away 50s. (+) nausea (+) vomiting (-) photophobia (-) phonophobia. Stress can be a trigger Tried Topamax 25 mg BID (caused nausea) Fioricet, Sumatriptan. CT head (3/28/23) . No evidence of acute or significant intracranial abnormality.     On 2/15/24, patient had bilateral occipital nerve blocks with benefit.    Currently has 5/10 occipital pain.       ROS:  Per HPI-  Otherwise the remainder of ROS was negative    Social Hx  Social History     Socioeconomic History    Marital status:    Tobacco Use    Smoking status: Never    Smokeless tobacco: Never   Vaping Use    Vaping Use: Never used   Substance and Sexual Activity    Alcohol use: No    Drug use: No     Social Determinants of Health     Financial Resource Strain: Patient Declined (10/31/2023)    Overall Financial Resource Strain (CARDIA)     Difficulty of Paying Living Expenses: Patient declined   Transportation Needs: Unknown (10/31/2023)    PRAPARE - Transportation     Lack of Transportation (Non-Medical): Patient declined   Housing Stability: Unknown (10/31/2023)    Housing Stability Vital Sign     Unstable Housing in the Last Year: Patient refused       Meds:  Current Outpatient Medications on File Prior to Visit   Medication Sig Dispense Refill    oxyBUTYnin (DITROPAN) 5 MG tablet TAKE 1 TABLET BY MOUTH TWICE DAILY 60 tablet 0    amLODIPine (NORVASC) 5 MG tablet TAKE 1 TABLET BY MOUTH ONE TIME DAILY 90 tablet 0    ezetimibe (ZETIA) 10 MG tablet Take 1 tablet

## 2024-07-18 ENCOUNTER — OFFICE VISIT (OUTPATIENT)
Age: 74
End: 2024-07-18
Payer: MEDICARE

## 2024-07-18 VITALS
DIASTOLIC BLOOD PRESSURE: 80 MMHG | SYSTOLIC BLOOD PRESSURE: 120 MMHG | BODY MASS INDEX: 35.46 KG/M2 | OXYGEN SATURATION: 98 % | HEART RATE: 91 BPM | HEIGHT: 64 IN | TEMPERATURE: 96 F

## 2024-07-18 DIAGNOSIS — R51.9 INTRACTABLE HEADACHE, UNSPECIFIED CHRONICITY PATTERN, UNSPECIFIED HEADACHE TYPE: Primary | ICD-10-CM

## 2024-07-18 PROCEDURE — 3079F DIAST BP 80-89 MM HG: CPT | Performed by: PSYCHIATRY & NEUROLOGY

## 2024-07-18 PROCEDURE — G8428 CUR MEDS NOT DOCUMENT: HCPCS | Performed by: PSYCHIATRY & NEUROLOGY

## 2024-07-18 PROCEDURE — G8400 PT W/DXA NO RESULTS DOC: HCPCS | Performed by: PSYCHIATRY & NEUROLOGY

## 2024-07-18 PROCEDURE — G8417 CALC BMI ABV UP PARAM F/U: HCPCS | Performed by: PSYCHIATRY & NEUROLOGY

## 2024-07-18 PROCEDURE — 1090F PRES/ABSN URINE INCON ASSESS: CPT | Performed by: PSYCHIATRY & NEUROLOGY

## 2024-07-18 PROCEDURE — 1123F ACP DISCUSS/DSCN MKR DOCD: CPT | Performed by: PSYCHIATRY & NEUROLOGY

## 2024-07-18 PROCEDURE — 3017F COLORECTAL CA SCREEN DOC REV: CPT | Performed by: PSYCHIATRY & NEUROLOGY

## 2024-07-18 PROCEDURE — 3074F SYST BP LT 130 MM HG: CPT | Performed by: PSYCHIATRY & NEUROLOGY

## 2024-07-18 PROCEDURE — 1036F TOBACCO NON-USER: CPT | Performed by: PSYCHIATRY & NEUROLOGY

## 2024-07-18 PROCEDURE — 99214 OFFICE O/P EST MOD 30 MIN: CPT | Performed by: PSYCHIATRY & NEUROLOGY

## 2024-07-18 RX ORDER — GABAPENTIN 300 MG/1
300 CAPSULE ORAL
Qty: 30 CAPSULE | Refills: 3 | Status: SHIPPED | OUTPATIENT
Start: 2024-07-18 | End: 2025-01-14

## 2024-08-12 DIAGNOSIS — N39.498 OTHER URINARY INCONTINENCE: ICD-10-CM

## 2024-08-12 RX ORDER — OXYBUTYNIN CHLORIDE 5 MG/1
5 TABLET ORAL 2 TIMES DAILY
Qty: 60 TABLET | Refills: 0 | Status: SHIPPED | OUTPATIENT
Start: 2024-08-12

## 2024-08-14 DIAGNOSIS — E11.8 TYPE II DIABETES MELLITUS WITH COMPLICATION (HCC): ICD-10-CM

## 2024-08-22 DIAGNOSIS — E78.2 MIXED HYPERLIPIDEMIA: ICD-10-CM

## 2024-08-22 DIAGNOSIS — I10 ESSENTIAL (PRIMARY) HYPERTENSION: ICD-10-CM

## 2024-08-24 RX ORDER — LOSARTAN POTASSIUM AND HYDROCHLOROTHIAZIDE 25; 100 MG/1; MG/1
1 TABLET ORAL DAILY
Qty: 90 TABLET | Refills: 0 | Status: SHIPPED | OUTPATIENT
Start: 2024-08-24

## 2024-08-24 RX ORDER — EZETIMIBE 10 MG/1
10 TABLET ORAL DAILY
Qty: 90 TABLET | Refills: 0 | Status: SHIPPED | OUTPATIENT
Start: 2024-08-24

## 2024-09-07 DIAGNOSIS — N39.498 OTHER URINARY INCONTINENCE: ICD-10-CM

## 2024-09-08 RX ORDER — OXYBUTYNIN CHLORIDE 5 MG/1
5 TABLET ORAL 2 TIMES DAILY
Qty: 60 TABLET | Refills: 0 | Status: SHIPPED | OUTPATIENT
Start: 2024-09-08

## 2024-09-17 DIAGNOSIS — I10 ESSENTIAL HYPERTENSION, BENIGN: ICD-10-CM

## 2024-09-17 RX ORDER — AMLODIPINE BESYLATE 5 MG/1
TABLET ORAL
Qty: 90 TABLET | Refills: 0 | Status: SHIPPED | OUTPATIENT
Start: 2024-09-17

## 2024-09-25 DIAGNOSIS — E11.8 TYPE II DIABETES MELLITUS WITH COMPLICATION (HCC): ICD-10-CM

## 2024-09-26 DIAGNOSIS — E11.8 TYPE II DIABETES MELLITUS WITH COMPLICATION (HCC): ICD-10-CM

## 2024-10-02 ENCOUNTER — OFFICE VISIT (OUTPATIENT)
Dept: PRIMARY CARE CLINIC | Facility: CLINIC | Age: 74
End: 2024-10-02

## 2024-10-02 VITALS
TEMPERATURE: 97 F | RESPIRATION RATE: 20 BRPM | HEIGHT: 64 IN | SYSTOLIC BLOOD PRESSURE: 136 MMHG | BODY MASS INDEX: 36.37 KG/M2 | OXYGEN SATURATION: 99 % | HEART RATE: 93 BPM | DIASTOLIC BLOOD PRESSURE: 85 MMHG | WEIGHT: 213 LBS

## 2024-10-02 DIAGNOSIS — Z23 NEED FOR SHINGLES VACCINE: ICD-10-CM

## 2024-10-02 DIAGNOSIS — Z23 ENCOUNTER FOR ADMINISTRATION OF VACCINE: ICD-10-CM

## 2024-10-02 DIAGNOSIS — Z12.11 COLON CANCER SCREENING: ICD-10-CM

## 2024-10-02 DIAGNOSIS — R51.9 DAILY HEADACHE: ICD-10-CM

## 2024-10-02 DIAGNOSIS — I10 PRIMARY HYPERTENSION: ICD-10-CM

## 2024-10-02 DIAGNOSIS — E11.8 TYPE II DIABETES MELLITUS WITH COMPLICATION (HCC): ICD-10-CM

## 2024-10-02 DIAGNOSIS — G47.33 OSA ON CPAP: ICD-10-CM

## 2024-10-02 DIAGNOSIS — Z00.00 MEDICARE ANNUAL WELLNESS VISIT, SUBSEQUENT: Primary | ICD-10-CM

## 2024-10-02 RX ORDER — SEMAGLUTIDE 1.34 MG/ML
0.25 INJECTION, SOLUTION SUBCUTANEOUS
Qty: 3 ML | Refills: 0 | Status: SHIPPED | OUTPATIENT
Start: 2024-10-02 | End: 2024-11-01

## 2024-10-02 RX ORDER — ZOSTER VACCINE RECOMBINANT, ADJUVANTED 50 MCG/0.5
0.5 KIT INTRAMUSCULAR SEE ADMIN INSTRUCTIONS
Qty: 0.5 ML | Refills: 0 | Status: SHIPPED | OUTPATIENT
Start: 2024-10-02 | End: 2025-03-31

## 2024-10-02 ASSESSMENT — ENCOUNTER SYMPTOMS
CONSTIPATION: 0
BACK PAIN: 0
DIARRHEA: 0
COUGH: 0
SORE THROAT: 0
EYE DISCHARGE: 0
COLOR CHANGE: 0
SHORTNESS OF BREATH: 0
CHEST TIGHTNESS: 0
ABDOMINAL PAIN: 0
RHINORRHEA: 0

## 2024-10-02 ASSESSMENT — PATIENT HEALTH QUESTIONNAIRE - PHQ9
2. FEELING DOWN, DEPRESSED OR HOPELESS: NOT AT ALL
SUM OF ALL RESPONSES TO PHQ QUESTIONS 1-9: 0
1. LITTLE INTEREST OR PLEASURE IN DOING THINGS: NOT AT ALL
SUM OF ALL RESPONSES TO PHQ QUESTIONS 1-9: 0
SUM OF ALL RESPONSES TO PHQ9 QUESTIONS 1 & 2: 0

## 2024-10-02 ASSESSMENT — LIFESTYLE VARIABLES
HOW MANY STANDARD DRINKS CONTAINING ALCOHOL DO YOU HAVE ON A TYPICAL DAY: PATIENT DOES NOT DRINK
HOW OFTEN DO YOU HAVE A DRINK CONTAINING ALCOHOL: MONTHLY OR LESS

## 2024-10-02 NOTE — PATIENT INSTRUCTIONS
10/2/2024  Medicare offers a range of preventive health benefits. Some of the tests and screenings are paid in full while other may be subject to a deductible, co-insurance, and/or copay.    Some of these benefits include a comprehensive review of your medical history including lifestyle, illnesses that may run in your family, and various assessments and screenings as appropriate.    After reviewing your medical record and screening and assessments performed today your provider may have ordered immunizations, labs, imaging, and/or referrals for you.  A list of these orders (if applicable) as well as your Preventive Care list are included within your After Visit Summary for your review.    Other Preventive Recommendations:    A preventive eye exam performed by an eye specialist is recommended every 1-2 years to screen for glaucoma; cataracts, macular degeneration, and other eye disorders.  A preventive dental visit is recommended every 6 months.  Try to get at least 150 minutes of exercise per week or 10,000 steps per day on a pedometer .  Order or download the FREE \"Exercise & Physical Activity: Your Everyday Guide\" from [employer]. Call 1-515.252.1331 or search [employer] [employer] online.  You need 4220-2893 mg of calcium and 4105-8282 IU of vitamin D per day. It is possible to meet your calcium requirement with diet alone, but a vitamin D supplement is usually necessary to meet this goal.  When exposed to the sun, use a sunscreen that protects against both UVA and UVB radiation with an SPF of 30 or greater. Reapply every 2 to 3 hours or after sweating, drying off with a towel, or swimming.  Always wear a seat belt when traveling in a car. Always wear a helmet when riding a bicycle or motorcycle.

## 2024-10-02 NOTE — PROGRESS NOTES
\"Have you been to the ER, urgent care clinic since your last visit?  Hospitalized since your last visit?\"    NO      “Have you seen or consulted any other health care providers outside our system since your last visit?”    Dentist       “Have you had a colorectal cancer screening such as a colonoscopy/FIT/Cologuard?    NO       “Have you had a diabetic eye exam?”    Over a year.       Chief Complaint   Patient presents with    Medicare AW    Medication Refill       Pt is ok with both scribes.     Patient provided with most updated VIS prior to administration. Opportunity given for questions and concerns provided. No concerns at this time    Immunizations administered to patient 10/2/2024 by Lorna Weeks LPN with consent.Patient tolerated procedure well. No reactions noted.  
MD Tawny as PCP - Empaneled Provider  Americo Reno MD as Physician  Brittni Josue [facility] as Pharmacist (Pharmacist)      Reviewed and updated this visit:  Tobacco  Allergies  Meds  Problems  Med Hx  Surg Hx  Soc Hx  Fam Hx       Reviewed and updated this visit:  Tobacco  Allergies  Meds  Problems  Med Hx  Surg Hx  Soc Hx  Fam Hx      Health screenings:   Eye exam up to date , encouraged making an eye exam soon   Mammogram up to date , followed by Gyn   DEXA scan up to date   Colon cancer screening FIT test order    PAP smear followed by Gyn   COVID vaccine up to date , will get booster dose in  2 weeks   Pneumonia vaccine  up to date   Tdap up to date   Influenza  up to date   Shingles vaccine  script sent to the pharmacy   Activity level recommended 30 minutes walk daily.   Incontinence None on Oxybutynin.   Controlled substance/Opioids none     All other Health screenings done under rooming encounter         
100 mg/dL Final    BUN 10/31/2023 21 (H)  6 - 20 MG/DL Final    Creatinine 10/31/2023 0.80  0.55 - 1.02 MG/DL Final    BUN/Creatinine Ratio 10/31/2023 26 (H)  12 - 20   Final    Est, [place]ake Filt Rate 10/31/2023 >60  >60 ml/min/1.73m2 Final    Comment:   Pediatric calculator link: https://www.kidney.org/professionals/kdoqi/gfr_calculatorped    These results are not intended for use in patients <18 years of age.    eGFR results are calculated without a race factor using  the 2021 CKD-EPI equation. Careful clinical correlation is recommended, particularly when comparing to results calculated using previous equations.  The CKD-EPI equation is less accurate in patients with extremes of muscle mass, extra-renal metabolism of creatinine, excessive creatine ingestion, or following therapy that affects renal tubular secretion.      Calcium 10/31/2023 10.0  8.5 - 10.1 MG/DL Final    Total Bilirubin 10/31/2023 0.6  0.2 - 1.0 MG/DL Final    ALT 10/31/2023 23  12 - 78 U/L Final    AST 10/31/2023 11 (L)  15 - 37 U/L Final    Alk Phosphatase 10/31/2023 61  45 - 117 U/L Final    Total Protein 10/31/2023 7.4  6.4 - 8.2 g/dL Final    Albumin 10/31/2023 4.1  3.5 - 5.0 g/dL Final    Globulin 10/31/2023 3.3  2.0 - 4.0 g/dL Final    Albumin/Globulin Ratio 10/31/2023 1.2  1.1 - 2.2   Final    WBC 10/31/2023 7.4  3.6 - 11.0 K/uL Final    RBC 10/31/2023 4.32  3.80 - 5.20 M/uL Final    Hemoglobin 10/31/2023 13.5  11.5 - 16.0 g/dL Final    Hematocrit 10/31/2023 40.7  35.0 - 47.0 % Final    MCV 10/31/2023 94.2  80.0 - 99.0 FL Final    MCH 10/31/2023 31.3  26.0 - 34.0 PG Final    MCHC 10/31/2023 33.2  30.0 - 36.5 g/dL Final    RDW 10/31/2023 12.0  11.5 - 14.5 % Final    Platelets 10/31/2023 387  150 - 400 K/uL Final    MPV 10/31/2023 10.7  8.9 - 12.9 FL Final    Nucleated RBCs 10/31/2023 0.0  0  WBC Final    nRBC 10/31/2023 0.00  0.00 - 0.01 K/uL Final         Review of Systems   Constitutional:  Negative for activity change, fatigue and

## 2024-10-03 ENCOUNTER — TELEPHONE (OUTPATIENT)
Dept: PHARMACY | Facility: CLINIC | Age: 74
End: 2024-10-03

## 2024-10-03 DIAGNOSIS — E11.8 TYPE II DIABETES MELLITUS WITH COMPLICATION (HCC): ICD-10-CM

## 2024-10-03 LAB
CREAT UR-MCNC: 34.7 MG/DL
MICROALBUMIN UR-MCNC: 0.54 MG/DL
MICROALBUMIN/CREAT UR-RTO: 16 MG/G (ref 0–30)
SPECIMEN HOLD: NORMAL

## 2024-10-03 NOTE — TELEPHONE ENCOUNTER
**[facility]**    Attempt made to contact patient at the home number.    Spoke to patient at home number and advised them of the previous message.    Patient verified understanding and scheduled a in person appointment .  Appointment scheduled for 10/9/24 at 11:00 am with Shahnaz Jeffries.    Shakira Chen [facility]   Ambulatory Pharmacy Clinical   [phone]  Department, toll free: [phone], option 2       For Pharmacy Admin Tracking Only    Program: Medical Group  Recommendation Provided To: Patient/Caregiver: 1 via Telephone  Intervention Detail: Scheduled Appointment  Intervention Accepted By: Patient/Caregiver: 1  Gap Closed?: Yes   Time Spent (min): 10

## 2024-10-03 NOTE — TELEPHONE ENCOUNTER
Reason for referral: DM  Referring provider: Dr Robin  Referring provider office: [facility]  Referred to: Shahnaz Jeffries, PharmD, BCGP, BCACP  Status of Patient: New Patient  Length of Appt: 60 min  Type of Appt: In Person   Patient need address: No

## 2024-10-04 DIAGNOSIS — N39.498 OTHER URINARY INCONTINENCE: ICD-10-CM

## 2024-10-04 RX ORDER — OXYBUTYNIN CHLORIDE 5 MG/1
5 TABLET ORAL 2 TIMES DAILY
Qty: 60 TABLET | Refills: 0 | Status: SHIPPED | OUTPATIENT
Start: 2024-10-04

## 2024-10-05 LAB
ALBUMIN SERPL-MCNC: 4.2 G/DL (ref 3.5–5)
ALBUMIN/GLOB SERPL: 1.6 (ref 1.1–2.2)
ALP SERPL-CCNC: 69 U/L (ref 45–117)
ALT SERPL-CCNC: 26 U/L (ref 12–78)
ANION GAP SERPL CALC-SCNC: 5 MMOL/L (ref 2–12)
AST SERPL-CCNC: 8 U/L (ref 15–37)
BILIRUB SERPL-MCNC: 0.8 MG/DL (ref 0.2–1)
BUN SERPL-MCNC: 16 MG/DL (ref 6–20)
BUN/CREAT SERPL: 18 (ref 12–20)
CALCIUM SERPL-MCNC: 10.2 MG/DL (ref 8.5–10.1)
CHLORIDE SERPL-SCNC: 102 MMOL/L (ref 97–108)
CHOLEST SERPL-MCNC: 184 MG/DL
CO2 SERPL-SCNC: 29 MMOL/L (ref 21–32)
CREAT SERPL-MCNC: 0.87 MG/DL (ref 0.55–1.02)
ERYTHROCYTE [DISTWIDTH] IN BLOOD BY AUTOMATED COUNT: 12.3 % (ref 11.5–14.5)
EST. AVERAGE GLUCOSE BLD GHB EST-MCNC: 154 MG/DL
GLOBULIN SER CALC-MCNC: 2.7 G/DL (ref 2–4)
GLUCOSE SERPL-MCNC: 152 MG/DL (ref 65–100)
HBA1C MFR BLD: 7 % (ref 4–5.6)
HCT VFR BLD AUTO: 40.2 % (ref 35–47)
HDLC SERPL-MCNC: 58 MG/DL
HDLC SERPL: 3.2 (ref 0–5)
HEMOCCULT STL QL IA: NEGATIVE
HGB BLD-MCNC: 13.4 G/DL (ref 11.5–16)
LDLC SERPL CALC-MCNC: 85.4 MG/DL (ref 0–100)
MCH RBC QN AUTO: 32 PG (ref 26–34)
MCHC RBC AUTO-ENTMCNC: 33.3 G/DL (ref 30–36.5)
MCV RBC AUTO: 95.9 FL (ref 80–99)
NRBC # BLD: 0 K/UL (ref 0–0.01)
NRBC BLD-RTO: 0 PER 100 WBC
PLATELET # BLD AUTO: 364 K/UL (ref 150–400)
PMV BLD AUTO: 10.9 FL (ref 8.9–12.9)
POTASSIUM SERPL-SCNC: 4.2 MMOL/L (ref 3.5–5.1)
PROT SERPL-MCNC: 6.9 G/DL (ref 6.4–8.2)
RBC # BLD AUTO: 4.19 M/UL (ref 3.8–5.2)
SODIUM SERPL-SCNC: 136 MMOL/L (ref 136–145)
TRIGL SERPL-MCNC: 203 MG/DL
VLDLC SERPL CALC-MCNC: 40.6 MG/DL
WBC # BLD AUTO: 6.5 K/UL (ref 3.6–11)

## 2024-10-08 DIAGNOSIS — E11.8 TYPE II DIABETES MELLITUS WITH COMPLICATION (HCC): ICD-10-CM

## 2024-10-09 ENCOUNTER — ENROLLMENT (OUTPATIENT)
Dept: PRIMARY CARE CLINIC | Facility: CLINIC | Age: 74
End: 2024-10-09

## 2024-10-09 ENCOUNTER — PHARMACY VISIT (OUTPATIENT)
Dept: PRIMARY CARE CLINIC | Facility: CLINIC | Age: 74
End: 2024-10-09

## 2024-10-09 DIAGNOSIS — E11.8 TYPE II DIABETES MELLITUS WITH COMPLICATION (HCC): Primary | ICD-10-CM

## 2024-10-09 RX ORDER — GLUCOSAMINE HCL/CHONDROITIN SU 500-400 MG
CAPSULE ORAL
Qty: 100 STRIP | Refills: 3 | Status: SHIPPED | OUTPATIENT
Start: 2024-10-09

## 2024-10-09 RX ORDER — LANCETS 30 GAUGE
EACH MISCELLANEOUS
Qty: 100 EACH | Refills: 3 | Status: SHIPPED | OUTPATIENT
Start: 2024-10-09

## 2024-10-09 RX ORDER — LANCING DEVICE
EACH MISCELLANEOUS
Qty: 1 EACH | Refills: 0 | Status: SHIPPED | OUTPATIENT
Start: 2024-10-09

## 2024-10-09 RX ORDER — BLOOD-GLUCOSE METER
KIT MISCELLANEOUS
Qty: 1 KIT | Refills: 0 | Status: SHIPPED | OUTPATIENT
Start: 2024-10-09

## 2024-10-09 NOTE — PATIENT INSTRUCTIONS
Your A1c was 7.0% which was uncontrolled.    Today we applied for Rx assistance with [employer].  You will be enrolled till the end of .  You will receive a letter in the mail stating you are enrolled.  You will receive 4 months worth of medication to your PCP's office.  The office will call you when it has arrived for you to .    - Ozempic 0.25 mg weekly x4 weeks then increase to 0.5 mg weekly    If you don't receive a letter in the mail in the next 2 weeks - call me (number below).    I will also talk to your PCP about sending in blood sugar testing supplies.  When you get your supplies, call me to set up an appt to go thru how to use the supplies.    Blood Sugar Goal  Fastin-130 mg/dL  1-2 after meals: Less than 180 mg/dL    Carbohydrate Goals  Meal: 30-45 grams   Snacks: 15 grams    Pharmacist Contact Information:  Shahnaz Jeffries, Ric, BCGP, BCACP  Work Phone: [phone] (option #2)

## 2024-10-09 NOTE — PROGRESS NOTES
Pharmacy Progress Note - Diabetes Management    S/O: Ms. Nereyda Rivas is a 73 y.o. female, referred by Tawny Jara MD, with a PMH of HTN, sleep apnea, GERD, T2Dm, intractable migraine mixed hyperlipidemia, was seen today for diabetes management.  Patient's last A1c was 7.0% (Oct 2024).       Interim update: Pt arrives to clinic today to discuss diabetes management.  She has started newly prescribed Ozempic and completed first injection on Sunday.  Reports that med decreases appetite.  Notes some reflux and belching if eating large meals    Discussed pathophysiology of DM, complications, progression, BG/A1c goals.  Discussed medications - mechanism of action, side effects, efficacy.        Medication Adherence/Access:  - Cost of Ozempic was $90/mo  - Eligible for Rx assistance    Current anti-hyperglycemic regimen includes:    - Metformin 1000 mg BID meals  - Ozempic 0.25 mg weekly    ROS:  Today, Pt endorses:  - Symptoms of Hyperglycemia: none  - Symptoms of Hypoglycemia: none    Self Monitoring Blood Glucose (SMBG) or CGM:  - Brought in home glucometer/blood glucose log/CGM reader today:  no  - Checks BG: never      Vitals:  Wt Readings from Last 3 Encounters:   10/02/24 96.6 kg (213 lb)   10/31/23 93.7 kg (206 lb 9.6 oz)   07/31/23 93 kg (205 lb)     BP Readings from Last 3 Encounters:   10/02/24 136/85   07/18/24 120/80   02/15/24 130/80     Pulse Readings from Last 3 Encounters:   10/02/24 93   07/18/24 91   02/15/24 86       Past Medical History:   Diagnosis Date    Esophageal reflux 4/13/2010    Essential hypertension, benign 4/13/2010    Mixed hyperlipidemia 4/13/2010    Obesity, unspecified 4/13/2010    LUCY (obstructive sleep apnea) 08-    AHI: 5.6 per hour     Allergies   Allergen Reactions    Nitrofurantoin Itching    Atenolol-Chlorthalidone Other (See Comments)     fatigue    Codeine Nausea Only     GI    Simvastatin Swelling       Current Outpatient Medications   Medication Sig

## 2024-10-21 ENCOUNTER — TELEPHONE (OUTPATIENT)
Dept: PHARMACY | Facility: CLINIC | Age: 74
End: 2024-10-21

## 2024-10-21 ENCOUNTER — TELEPHONE (OUTPATIENT)
Dept: PRIMARY CARE CLINIC | Facility: CLINIC | Age: 74
End: 2024-10-21

## 2024-10-21 NOTE — TELEPHONE ENCOUNTER
Appointment Request From: Nereyda Rivas     With Provider: Shahnaz Jeffries RP [[facility]]     Preferred Date Range: 10/23/2024 - 10/23/2024     Preferred Times: Any Time     Reason for visit: Request an Appointment     Comments:  Not approved for Ozempic or the Freestyle.  I need help w/forms they sent.

## 2024-10-21 NOTE — TELEPHONE ENCOUNTER
*Incoming Call*    Patient requested an appointment with Shahnaz Jeffries to discuss Ozempic.       Patient requested appointment and scheduled a in person appointment .  Appointment scheduled for 10/23/24 at 11:00 am.    Shakira Chen [facility]   Ambulatory Pharmacy Clinical   [phone]  Department, toll free: [phone], option 2       For Pharmacy Admin Tracking Only    Program: Medical Group  Gap Closed?: Yes   Time Spent (min): 10

## 2024-10-23 ENCOUNTER — PHARMACY VISIT (OUTPATIENT)
Dept: PRIMARY CARE CLINIC | Facility: CLINIC | Age: 74
End: 2024-10-23

## 2024-10-23 DIAGNOSIS — E11.8 TYPE II DIABETES MELLITUS WITH COMPLICATION (HCC): Primary | ICD-10-CM

## 2024-10-23 NOTE — PROGRESS NOTES
Pharmacy Progress Note - Diabetes Management    S/O: Ms. Nereyda Rivas is a 73 y.o. female, referred by Tawny Jara MD, with a PMH of HTN, sleep apnea, GERD, T2Dm, intractable migraine mixed hyperlipidemia, was seen today for diabetes management.  Patient's last A1c was 7.0% (Oct 2024).     Interim update: Pt arrives to clinic today stating that she received a letter in the mail from Rx assistance program, [employer], that says her application was incomplete.  Also states that her pharmacy requires a PA for glucometer and testing strips.    Contacted Rx assistance program concerning application.  Representative reviewed application again and states there was an error in processing.  Application is complete but has to be sent to [employer] to approve.  Will need to f/up to make sure it was approved.    Contacted pharmacy to investigate need for PA.  EHR records indicate no PA was required for glucometer and test strips.  Pharmacy tech states that there was an error that needed to be taken care of on their end.  Order was corrected and reprocessed without PA.    Pt had brought in her 's glucometer to learn how to check BG rdgs.  Pt was instructed on how to utilize glucometer.  Pt was oriented to different supplies - meter, test strips, lancets, lancing device.  Pt was able to successfully show how to load lancing device with a lancet.  Pt was able to successfully place test strip in glucometer.  Pt was able to produce a drop of blood from utilizing lancing device.  Test strip applied to drop of blood.  BG rdg in office was 116 mg/dL (ppBG).      Medication Adherence/Access:  - Applied for Rx assistance thru [employer] for Ozempic 0.25 mg/0.5 mg    Current anti-hyperglycemic regimen includes:    - Metformin 1000 mg BID meals  - Ozempic 0.25 mg weekly    ROS:  Today, Pt endorses:  - Symptoms of Hyperglycemia: none  - Symptoms of Hypoglycemia: none    Self Monitoring Blood Glucose (SMBG) or CGM:  -

## 2024-10-28 ENCOUNTER — SCHEDULED TELEPHONE ENCOUNTER (OUTPATIENT)
Age: 74
End: 2024-10-28

## 2024-10-28 DIAGNOSIS — E11.8 TYPE II DIABETES MELLITUS WITH COMPLICATION (HCC): Primary | ICD-10-CM

## 2024-10-28 NOTE — PROGRESS NOTES
Pharmacy Progress Note     This pt was seen by this writer on 10/23/24 for f/up on Rx assistance application and PA for glucometer.    Glucometer PA was resolved during visit.    Rx assistance application had to be reprocessed by [employer].  This writer called program to check in on status.  Rep states that she is now approved.  Medication will ship out in 10-14 business days.    Pt was contacted to provide update.  She is out of Ozempic and needs a refill.  Informed her that she could  a sample of Ozempic at PCP's office.    There are no discontinued medications.  No orders of the defined types were placed in this encounter.        Shahnaz Jeffries, PharmD, BCGP, BCACP  Clinical Pharmacist Specialist      For Pharmacy Admin Tracking Only    Program: Medical Group  CPA in place:  No  Recommendation Provided To: Patient/Caregiver: 2 via Telephone and Other: 2  Intervention Detail: Patient Access Assistance/Sample Provided and Refill(s) Provided  Intervention Accepted By: Patient/Caregiver: 2 and Other: 2  Time Spent (min): 45

## 2024-11-02 DIAGNOSIS — N39.498 OTHER URINARY INCONTINENCE: ICD-10-CM

## 2024-11-02 RX ORDER — OXYBUTYNIN CHLORIDE 5 MG/1
5 TABLET ORAL 2 TIMES DAILY
Qty: 60 TABLET | Refills: 0 | Status: SHIPPED | OUTPATIENT
Start: 2024-11-02

## 2024-11-04 NOTE — PROGRESS NOTES
Neurology Progress Note    Patient ID:  Nereyda Rivas  [id number]  73 y.o.  1950      Subjective:   History:  Nereyda Rivas is a female who  has a past medical history of Esophageal reflux, Essential hypertension, benign, Mixed hyperlipidemia, Obesity, unspecified, and LUCY (obstructive sleep apnea). Who since Feb 2023, noted daily headache R occipital area radiating to top of head, worse at night, sharp, 8/10, better when she sits up, lasting for 3-4 hrs (+) migraines in your 20s and went away 50s. (+) nausea (+) vomiting (-) photophobia (-) phonophobia. Stress can be a trigger Tried Topamax 25 mg BID (caused nausea) Fioricet, Sumatriptan. CT head (3/28/23) . No evidence of acute or significant intracranial abnormality.  On 2/15/24, patient had bilateral occipital nerve blocks with benefit.     Currently has 4/10 R occipital pain.  L side is doing okay    Tried Gabapentin 300 mg QHS with no clear benefit and makes her drowsy.    Has not used ice pack consistently.          ROS:  Per HPI-  Otherwise the remainder of ROS was negative    Social Hx  Social History     Socioeconomic History    Marital status:    Tobacco Use    Smoking status: Never    Smokeless tobacco: Never   Vaping Use    Vaping status: Never Used   Substance and Sexual Activity    Alcohol use: No    Drug use: No     Social Determinants of Health     Financial Resource Strain: Patient Declined (10/31/2023)    Overall Financial Resource Strain (CARDIA)     Difficulty of Paying Living Expenses: Patient declined   Transportation Needs: Unknown (10/31/2023)    PRAPARE - Transportation     Lack of Transportation (Non-Medical): Patient declined   Housing Stability: Unknown (10/31/2023)    Housing Stability Vital Sign     Unstable Housing in the Last Year: Patient refused       Meds:  Current Outpatient Medications on File Prior to Visit   Medication Sig Dispense Refill    Ferrous Sulfate (IRON PO) Take 1 tablet by mouth daily      OZEMPIC, 0.25

## 2024-11-05 ENCOUNTER — OFFICE VISIT (OUTPATIENT)
Age: 74
End: 2024-11-05

## 2024-11-05 VITALS
SYSTOLIC BLOOD PRESSURE: 130 MMHG | OXYGEN SATURATION: 98 % | TEMPERATURE: 95.5 F | BODY MASS INDEX: 36.56 KG/M2 | HEIGHT: 64 IN | DIASTOLIC BLOOD PRESSURE: 80 MMHG | HEART RATE: 82 BPM

## 2024-11-05 DIAGNOSIS — R51.9 INTRACTABLE HEADACHE, UNSPECIFIED CHRONICITY PATTERN, UNSPECIFIED HEADACHE TYPE: Primary | ICD-10-CM

## 2024-11-05 RX ORDER — SEMAGLUTIDE 0.68 MG/ML
INJECTION, SOLUTION SUBCUTANEOUS
COMMUNITY
Start: 2024-10-02

## 2024-11-06 ENCOUNTER — TELEPHONE (OUTPATIENT)
Dept: PRIMARY CARE CLINIC | Facility: CLINIC | Age: 74
End: 2024-11-06

## 2024-11-06 NOTE — TELEPHONE ENCOUNTER
Called and spoke to pt and told her we got her ozempic in at the office and if she can pick it up within the next week. She said thank you and she will come today.

## 2024-11-17 DIAGNOSIS — I10 ESSENTIAL (PRIMARY) HYPERTENSION: ICD-10-CM

## 2024-11-17 DIAGNOSIS — E78.2 MIXED HYPERLIPIDEMIA: ICD-10-CM

## 2024-11-17 RX ORDER — LOSARTAN POTASSIUM AND HYDROCHLOROTHIAZIDE 25; 100 MG/1; MG/1
1 TABLET ORAL DAILY
Qty: 90 TABLET | Refills: 0 | Status: SHIPPED | OUTPATIENT
Start: 2024-11-17

## 2024-11-17 RX ORDER — EZETIMIBE 10 MG/1
10 TABLET ORAL DAILY
Qty: 90 TABLET | Refills: 0 | Status: SHIPPED | OUTPATIENT
Start: 2024-11-17

## 2024-12-04 ENCOUNTER — PHARMACY VISIT (OUTPATIENT)
Dept: PRIMARY CARE CLINIC | Facility: CLINIC | Age: 74
End: 2024-12-04

## 2024-12-04 DIAGNOSIS — E11.8 TYPE II DIABETES MELLITUS WITH COMPLICATION (HCC): Primary | ICD-10-CM

## 2024-12-04 NOTE — PATIENT INSTRUCTIONS
Your A1c was 7.0% which was uncontrolled.    Today we applied for Rx assistance with [employer].  You will receive 4 months supply of medication at a time to your PCP's office.  You will get a call from the office when it arrives.  - Ozempic 0.5 mg    Blood Sugar Goal  Fastin-130 mg/dL  1-2 after meals: Less than 180 mg/dL    Carbohydrate Goals  Meal: 30-45 grams   Snacks: 15 grams    Pharmacist Contact Information:  Shahnaz Jeffries PharmD, BCGP, BCACP  Work Phone: [phone] (option #2)

## 2024-12-13 DIAGNOSIS — N39.498 OTHER URINARY INCONTINENCE: ICD-10-CM

## 2024-12-13 DIAGNOSIS — I10 ESSENTIAL HYPERTENSION, BENIGN: ICD-10-CM

## 2024-12-15 RX ORDER — OXYBUTYNIN CHLORIDE 5 MG/1
5 TABLET ORAL 2 TIMES DAILY
Qty: 60 TABLET | Refills: 0 | Status: SHIPPED | OUTPATIENT
Start: 2024-12-15

## 2024-12-15 RX ORDER — AMLODIPINE BESYLATE 5 MG/1
TABLET ORAL
Qty: 90 TABLET | Refills: 0 | Status: SHIPPED | OUTPATIENT
Start: 2024-12-15

## 2024-12-16 ENCOUNTER — TELEPHONE (OUTPATIENT)
Dept: PHARMACY | Facility: CLINIC | Age: 74
End: 2024-12-16

## 2024-12-16 NOTE — TELEPHONE ENCOUNTER
*Incoming Call*    [employer] sent patient a letter stating that they are missing information from the provider but does not specify what they are missing.     Please call patient at [phone] or send [employer] message    Shakira Chen [facility]   Ambulatory Pharmacy Clinical   [phone]  Department, toll free: [phone], option 2     For Pharmacy Admin Tracking Only    Program: Medical Group  Gap Closed?: Yes   Time Spent (min): 10

## 2025-01-13 ENCOUNTER — OFFICE VISIT (OUTPATIENT)
Age: 75
End: 2025-01-13

## 2025-01-13 VITALS
TEMPERATURE: 98.3 F | OXYGEN SATURATION: 97 % | HEIGHT: 64 IN | WEIGHT: 195.8 LBS | RESPIRATION RATE: 16 BRPM | HEART RATE: 116 BPM | BODY MASS INDEX: 33.43 KG/M2 | SYSTOLIC BLOOD PRESSURE: 161 MMHG | DIASTOLIC BLOOD PRESSURE: 104 MMHG

## 2025-01-13 DIAGNOSIS — R30.0 DYSURIA: Primary | ICD-10-CM

## 2025-01-13 LAB
BILIRUBIN, URINE, POC: NEGATIVE
BLOOD URINE, POC: NEGATIVE
GLUCOSE URINE, POC: NEGATIVE
KETONES, URINE, POC: NEGATIVE
LEUKOCYTE ESTERASE, URINE, POC: NEGATIVE
NITRITE, URINE, POC: NEGATIVE
PH, URINE, POC: 6.5 (ref 4.6–8)
PROTEIN,URINE, POC: NEGATIVE
SPECIFIC GRAVITY, URINE, POC: 1.01 (ref 1–1.03)
URINALYSIS CLARITY, POC: CLEAR
URINALYSIS COLOR, POC: YELLOW
UROBILINOGEN, POC: NORMAL

## 2025-01-13 RX ORDER — PHENAZOPYRIDINE HYDROCHLORIDE 200 MG/1
200 TABLET, FILM COATED ORAL 3 TIMES DAILY PRN
Qty: 9 TABLET | Refills: 0 | Status: SHIPPED | OUTPATIENT
Start: 2025-01-13 | End: 2025-01-16

## 2025-01-13 RX ORDER — SULFAMETHOXAZOLE AND TRIMETHOPRIM 800; 160 MG/1; MG/1
1 TABLET ORAL 2 TIMES DAILY
Qty: 6 TABLET | Refills: 0 | Status: SHIPPED | OUTPATIENT
Start: 2025-01-13 | End: 2025-01-16

## 2025-01-13 ASSESSMENT — ENCOUNTER SYMPTOMS
NAUSEA: 0
VOMITING: 0

## 2025-01-19 DIAGNOSIS — N39.498 OTHER URINARY INCONTINENCE: ICD-10-CM

## 2025-01-19 RX ORDER — OXYBUTYNIN CHLORIDE 5 MG/1
5 TABLET ORAL 2 TIMES DAILY
Qty: 60 TABLET | Refills: 0 | Status: SHIPPED | OUTPATIENT
Start: 2025-01-19

## 2025-01-20 ENCOUNTER — OFFICE VISIT (OUTPATIENT)
Age: 75
End: 2025-01-20

## 2025-01-20 VITALS
OXYGEN SATURATION: 97 % | HEART RATE: 100 BPM | TEMPERATURE: 97.8 F | SYSTOLIC BLOOD PRESSURE: 155 MMHG | DIASTOLIC BLOOD PRESSURE: 95 MMHG | WEIGHT: 192.2 LBS | BODY MASS INDEX: 32.99 KG/M2

## 2025-01-20 DIAGNOSIS — R30.0 DYSURIA: ICD-10-CM

## 2025-01-20 DIAGNOSIS — N30.00 ACUTE CYSTITIS WITHOUT HEMATURIA: Primary | ICD-10-CM

## 2025-01-20 LAB
BILIRUBIN, URINE, POC: NEGATIVE
BLOOD URINE, POC: NEGATIVE
GLUCOSE URINE, POC: NEGATIVE
KETONES, URINE, POC: NEGATIVE
LEUKOCYTE ESTERASE, URINE, POC: NEGATIVE
NITRITE, URINE, POC: NEGATIVE
PH, URINE, POC: 5.5 (ref 4.6–8)
PROTEIN,URINE, POC: NEGATIVE
SPECIFIC GRAVITY, URINE, POC: 1.01 (ref 1–1.03)
URINALYSIS CLARITY, POC: CLEAR
URINALYSIS COLOR, POC: YELLOW
UROBILINOGEN, POC: NORMAL MG/DL

## 2025-01-20 RX ORDER — SULFAMETHOXAZOLE AND TRIMETHOPRIM 800; 160 MG/1; MG/1
1 TABLET ORAL 2 TIMES DAILY
Qty: 10 TABLET | Refills: 0 | Status: SHIPPED | OUTPATIENT
Start: 2025-01-20 | End: 2025-01-25

## 2025-01-20 NOTE — PROGRESS NOTES
2025   Nereyda Rivas (: 1950) is a 74 y.o. female, Established patient, here for evaluation of the following chief complaint(s):  Urinary Tract Infection (Suspects UTI  X 2 weeks)     ASSESSMENT/PLAN:  Below is the assessment and plan developed based on review of pertinent history, physical exam, labs, studies, and medications.  1. Acute cystitis without hematuria  -     sulfamethoxazole-trimethoprim (BACTRIM DS;SEPTRA DS) 800-160 MG per tablet; Take 1 tablet by mouth 2 times daily for 5 days, Disp-10 tablet, R-0Normal  2. Dysuria  -     Culture, Urine; Future  -     AMB POC URINALYSIS DIP STICK MANUAL W/O MICRO     Symptoms today consistent with urinary tract infection. Urinalysis results unremarkable. Will treat based upon symptoms today.  No fevers/chills or flank pain to suggest ascending infection or complications at this time  Will treat with bactrim for 5 days as patient is allergic to macrobid and reports improvement of symptoms with the three day course of bactrim.  Offered to prescribe pyridium; pt refused  Urine culture sent for confirmation, will contact you in 2-3 days with results  Continue to drink lots of fluids  Follow up here or with PCP if symptoms persist  Go to the ED with any high fevers/chills, severe back/flank pain, or if vomiting and unable to tolerate fluids/medication    Handout given with care instructions  2. OTC for symptom management. Increase fluid intake, ensure adequate nutritional intake.  3. Follow up with PCP as needed.  4. Go to ED with development of any acute symptoms.     Follow up:    Follow up immediately for any new, worsening or changes or if symptoms are not improving over the next 5-7 days.     SUBJECTIVE/OBJECTIVE:  Patient here today with c/o burning with urination, abdominal pressure, urinary urgency for a few weeks. Reports that she was seen for similar symptoms and treated with bactrim for three days. Reports that she felt that symptoms were

## 2025-01-22 LAB
BACTERIA SPEC CULT: NORMAL
SERVICE CMNT-IMP: NORMAL

## 2025-01-28 ENCOUNTER — OFFICE VISIT (OUTPATIENT)
Dept: PRIMARY CARE CLINIC | Facility: CLINIC | Age: 75
End: 2025-01-28
Payer: MEDICARE

## 2025-01-28 ENCOUNTER — PATIENT MESSAGE (OUTPATIENT)
Dept: PRIMARY CARE CLINIC | Facility: CLINIC | Age: 75
End: 2025-01-28

## 2025-01-28 VITALS
SYSTOLIC BLOOD PRESSURE: 132 MMHG | RESPIRATION RATE: 16 BRPM | HEART RATE: 100 BPM | OXYGEN SATURATION: 97 % | TEMPERATURE: 97.1 F | BODY MASS INDEX: 32.23 KG/M2 | DIASTOLIC BLOOD PRESSURE: 86 MMHG | WEIGHT: 188.8 LBS | HEIGHT: 64 IN

## 2025-01-28 DIAGNOSIS — E66.811 OBESITY (BMI 30.0-34.9): ICD-10-CM

## 2025-01-28 DIAGNOSIS — I10 PRIMARY HYPERTENSION: ICD-10-CM

## 2025-01-28 DIAGNOSIS — E11.8 TYPE II DIABETES MELLITUS WITH COMPLICATION (HCC): Primary | ICD-10-CM

## 2025-01-28 DIAGNOSIS — E11.8 TYPE II DIABETES MELLITUS WITH COMPLICATION (HCC): ICD-10-CM

## 2025-01-28 DIAGNOSIS — K59.03 DRUG-INDUCED CONSTIPATION: ICD-10-CM

## 2025-01-28 LAB
ALBUMIN SERPL-MCNC: 4.3 G/DL (ref 3.5–5)
ALBUMIN/GLOB SERPL: 1.5 (ref 1.1–2.2)
ALP SERPL-CCNC: 56 U/L (ref 45–117)
ALT SERPL-CCNC: 19 U/L (ref 12–78)
ANION GAP SERPL CALC-SCNC: 8 MMOL/L (ref 2–12)
AST SERPL-CCNC: 13 U/L (ref 15–37)
BILIRUB SERPL-MCNC: 0.6 MG/DL (ref 0.2–1)
BUN SERPL-MCNC: 14 MG/DL (ref 6–20)
BUN/CREAT SERPL: 15 (ref 12–20)
CALCIUM SERPL-MCNC: 10.4 MG/DL (ref 8.5–10.1)
CHLORIDE SERPL-SCNC: 99 MMOL/L (ref 97–108)
CO2 SERPL-SCNC: 26 MMOL/L (ref 21–32)
CREAT SERPL-MCNC: 0.91 MG/DL (ref 0.55–1.02)
ERYTHROCYTE [DISTWIDTH] IN BLOOD BY AUTOMATED COUNT: 12.3 % (ref 11.5–14.5)
EST. AVERAGE GLUCOSE BLD GHB EST-MCNC: 114 MG/DL
GLOBULIN SER CALC-MCNC: 2.8 G/DL (ref 2–4)
GLUCOSE SERPL-MCNC: 93 MG/DL (ref 65–100)
HBA1C MFR BLD: 5.6 % (ref 4–5.6)
HCT VFR BLD AUTO: 41.3 % (ref 35–47)
HGB BLD-MCNC: 13.9 G/DL (ref 11.5–16)
MCH RBC QN AUTO: 31.8 PG (ref 26–34)
MCHC RBC AUTO-ENTMCNC: 33.7 G/DL (ref 30–36.5)
MCV RBC AUTO: 94.5 FL (ref 80–99)
NRBC # BLD: 0 K/UL (ref 0–0.01)
NRBC BLD-RTO: 0 PER 100 WBC
PLATELET # BLD AUTO: 440 K/UL (ref 150–400)
PMV BLD AUTO: 10.3 FL (ref 8.9–12.9)
POTASSIUM SERPL-SCNC: 4.3 MMOL/L (ref 3.5–5.1)
PROT SERPL-MCNC: 7.1 G/DL (ref 6.4–8.2)
RBC # BLD AUTO: 4.37 M/UL (ref 3.8–5.2)
SODIUM SERPL-SCNC: 133 MMOL/L (ref 136–145)
WBC # BLD AUTO: 5.9 K/UL (ref 3.6–11)

## 2025-01-28 PROCEDURE — G8417 CALC BMI ABV UP PARAM F/U: HCPCS | Performed by: INTERNAL MEDICINE

## 2025-01-28 PROCEDURE — 3079F DIAST BP 80-89 MM HG: CPT | Performed by: INTERNAL MEDICINE

## 2025-01-28 PROCEDURE — 1090F PRES/ABSN URINE INCON ASSESS: CPT | Performed by: INTERNAL MEDICINE

## 2025-01-28 PROCEDURE — 1126F AMNT PAIN NOTED NONE PRSNT: CPT | Performed by: INTERNAL MEDICINE

## 2025-01-28 PROCEDURE — 3046F HEMOGLOBIN A1C LEVEL >9.0%: CPT | Performed by: INTERNAL MEDICINE

## 2025-01-28 PROCEDURE — G8427 DOCREV CUR MEDS BY ELIG CLIN: HCPCS | Performed by: INTERNAL MEDICINE

## 2025-01-28 PROCEDURE — 1123F ACP DISCUSS/DSCN MKR DOCD: CPT | Performed by: INTERNAL MEDICINE

## 2025-01-28 PROCEDURE — 1036F TOBACCO NON-USER: CPT | Performed by: INTERNAL MEDICINE

## 2025-01-28 PROCEDURE — 3075F SYST BP GE 130 - 139MM HG: CPT | Performed by: INTERNAL MEDICINE

## 2025-01-28 PROCEDURE — 3017F COLORECTAL CA SCREEN DOC REV: CPT | Performed by: INTERNAL MEDICINE

## 2025-01-28 PROCEDURE — 1159F MED LIST DOCD IN RCRD: CPT | Performed by: INTERNAL MEDICINE

## 2025-01-28 PROCEDURE — 99214 OFFICE O/P EST MOD 30 MIN: CPT | Performed by: INTERNAL MEDICINE

## 2025-01-28 PROCEDURE — 1160F RVW MEDS BY RX/DR IN RCRD: CPT | Performed by: INTERNAL MEDICINE

## 2025-01-28 PROCEDURE — G8400 PT W/DXA NO RESULTS DOC: HCPCS | Performed by: INTERNAL MEDICINE

## 2025-01-28 PROCEDURE — 2022F DILAT RTA XM EVC RTNOPTHY: CPT | Performed by: INTERNAL MEDICINE

## 2025-01-28 RX ORDER — SENNA AND DOCUSATE SODIUM 50; 8.6 MG/1; MG/1
2 TABLET, FILM COATED ORAL DAILY
Qty: 60 TABLET | Refills: 2 | Status: SHIPPED | OUTPATIENT
Start: 2025-01-28 | End: 2025-04-28

## 2025-01-28 SDOH — ECONOMIC STABILITY: FOOD INSECURITY: WITHIN THE PAST 12 MONTHS, THE FOOD YOU BOUGHT JUST DIDN'T LAST AND YOU DIDN'T HAVE MONEY TO GET MORE.: PATIENT DECLINED

## 2025-01-28 SDOH — ECONOMIC STABILITY: FOOD INSECURITY: WITHIN THE PAST 12 MONTHS, YOU WORRIED THAT YOUR FOOD WOULD RUN OUT BEFORE YOU GOT MONEY TO BUY MORE.: PATIENT DECLINED

## 2025-01-28 ASSESSMENT — ENCOUNTER SYMPTOMS
COLOR CHANGE: 0
EYE DISCHARGE: 0
ABDOMINAL PAIN: 0
SORE THROAT: 0
BACK PAIN: 0
CHEST TIGHTNESS: 0
CONSTIPATION: 1
DIARRHEA: 0
COUGH: 0
RHINORRHEA: 0
SHORTNESS OF BREATH: 0

## 2025-01-28 ASSESSMENT — PATIENT HEALTH QUESTIONNAIRE - PHQ9
1. LITTLE INTEREST OR PLEASURE IN DOING THINGS: NOT AT ALL
SUM OF ALL RESPONSES TO PHQ9 QUESTIONS 1 & 2: 0
SUM OF ALL RESPONSES TO PHQ QUESTIONS 1-9: 0
2. FEELING DOWN, DEPRESSED OR HOPELESS: NOT AT ALL
SUM OF ALL RESPONSES TO PHQ QUESTIONS 1-9: 0

## 2025-01-28 NOTE — TELEPHONE ENCOUNTER
For Pharmacy Admin Tracking Only    Program: Medical Group  CPA in place:  Yes  Recommendation Provided To: Patient/Caregiver: 1 via [employer] Message  Intervention Detail: Patient Access Assistance/Sample Provided  Intervention Accepted By: Patient/Caregiver: 1  Time Spent (min): 5

## 2025-01-28 NOTE — PROGRESS NOTES
Health Decision Maker has been checked with the patient   Primary Decision Maker: Josué Rivas - Spouse - [phone]     Patient has stated that the scribe can come in room    Chief Complaint   Patient presents with    Discuss Medications     Discuss Ozempic    Follow-up     Went to a  last week       \"Have you been to the ER, urgent care clinic since your last visit?  Hospitalized since your last visit?\"    YES - When: approximately 5 days ago.  Where and Why: [facility].    “Have you seen or consulted any other health care providers outside of [facility] since your last visit?”    NO      Vitals:    01/28/25 0901   BP: (!) 134/90   Site: Left Upper Arm   Position: Sitting   Pulse: 100   Resp: 16   Temp: 97.1 °F (36.2 °C)   TempSrc: Temporal   SpO2: 97%   Weight: 85.6 kg (188 lb 12.8 oz)   Height: 1.626 m (5' 4\")      Depression: Not at risk (1/28/2025)    PHQ-2     PHQ-2 Score: 0      Have you had a mammogram?”   YES - Where: Last summer- [facility]     Nurse/CMA to request most recent records if not in the chart    Date of last Mammogram: 11/30/2022   SENT      Click Here for Release of Records Request    Specialist patient sees none    Chart reviewed: immunizations are documented.   Immunization History   Administered Date(s) Administered    COVID-19, J&J, (age 18y+), IM, 0.5 mL 03/11/2021    COVID-19, MODERNA BLUE border, Primary or Immunocompromised, (age 12y+), IM, 100 mcg/0.5mL 10/23/2021    COVID-19, PFIZER Bivalent, DO NOT Dilute, (age 12y+), IM, 30 mcg/0.3 mL 10/08/2023    Influenza Virus Vaccine 10/08/2023    Influenza, FLUAD, (age 65 y+), IM, Quadv, 0.5mL 10/18/2021    Influenza, FLUAD, (age 65 y+), IM, Trivalent PF, 0.5mL 10/02/2024    Influenza, FLUZONE High Dose, (age 65 y+), IM, Trivalent PF, 0.5mL 09/08/2020    Pneumococcal, PCV-13, PREVNAR 13, (age 6w+), IM, 0.5mL 09/20/2017    Pneumococcal, PPSV23, PNEUMOVAX 23, (age 2y+), SC/IM, 0.5mL 06/20/2013, 09/08/2020    TDaP, ADACEL (age 
Urine, POC 01/13/2025 6.5  4.6 - 8.0 Final    Protein, Urine, POC 01/13/2025 Negative   Final    Urobilinogen, POC 01/13/2025 0.2 mg/dL   Final    Nitrite, Urine, POC 01/13/2025 Negative   Final    Leukocyte Esterase, Urine, POC 01/13/2025 Negative   Final         Review of Systems   Constitutional:  Negative for activity change, fatigue and unexpected weight change.   HENT:  Negative for congestion, hearing loss, rhinorrhea and sore throat.    Eyes:  Negative for discharge.   Respiratory:  Negative for cough, chest tightness and shortness of breath.    Gastrointestinal:  Positive for constipation. Negative for abdominal pain and diarrhea.   Genitourinary:  Positive for frequency. Negative for dysuria, flank pain and urgency.   Musculoskeletal:  Negative for arthralgias, back pain and myalgias.   Skin:  Negative for color change and rash.   Neurological:  Negative for dizziness, light-headedness and headaches.   Psychiatric/Behavioral:  Negative for dysphoric mood and sleep disturbance. The patient is not nervous/anxious.           BP (!) 134/90 (Site: Left Upper Arm, Position: Sitting)   Pulse 100   Temp 97.1 °F (36.2 °C) (Temporal)   Resp 16   Ht 1.626 m (5' 4\")   Wt 85.6 kg (188 lb 12.8 oz)   SpO2 97%   BMI 32.41 kg/m²     Physical Exam  Vitals and nursing note reviewed.   Constitutional:       General: She is not in acute distress.     Appearance: Normal appearance. She is obese. She is not diaphoretic.   HENT:      Right Ear: External ear normal.      Left Ear: External ear normal.   Eyes:      General: No scleral icterus.        Right eye: No discharge.         Left eye: No discharge.      Extraocular Movements: Extraocular movements intact.      Conjunctiva/sclera: Conjunctivae normal.   Cardiovascular:      Rate and Rhythm: Normal rate and regular rhythm.   Pulmonary:      Effort: Pulmonary effort is normal.      Breath sounds: Normal breath sounds. No wheezing.   Abdominal:      General: Bowel sounds

## 2025-01-31 NOTE — TELEPHONE ENCOUNTER
For Pharmacy Admin Tracking Only    Program: Medical Group  CPA in place:  Yes  Recommendation Provided To: Patient/Caregiver: 1 via [employer] Message  Intervention Detail: Patient Access Assistance/Sample Provided  Intervention Accepted By: Patient/Caregiver: 1  Time Spent (min): 5

## 2025-02-02 DIAGNOSIS — E11.8 TYPE II DIABETES MELLITUS WITH COMPLICATION (HCC): ICD-10-CM

## 2025-02-02 DIAGNOSIS — I10 ESSENTIAL (PRIMARY) HYPERTENSION: ICD-10-CM

## 2025-02-02 RX ORDER — LOSARTAN POTASSIUM AND HYDROCHLOROTHIAZIDE 25; 100 MG/1; MG/1
1 TABLET ORAL DAILY
Qty: 90 TABLET | Refills: 0 | Status: SHIPPED | OUTPATIENT
Start: 2025-02-02

## 2025-02-10 ENCOUNTER — PATIENT MESSAGE (OUTPATIENT)
Dept: PRIMARY CARE CLINIC | Facility: CLINIC | Age: 75
End: 2025-02-10

## 2025-02-10 NOTE — TELEPHONE ENCOUNTER
For Pharmacy Admin Tracking Only    Program: Medical Group  CPA in place:  No  Recommendation Provided To: Patient/Caregiver: 1 via [employer] Message  Intervention Detail: Patient Access Assistance/Sample Provided  Intervention Accepted By: Patient/Caregiver: 1  Time Spent (min): 5

## 2025-02-11 ENCOUNTER — PHARMACY VISIT (OUTPATIENT)
Age: 75
End: 2025-02-11

## 2025-02-11 DIAGNOSIS — E11.8 TYPE II DIABETES MELLITUS WITH COMPLICATION (HCC): Primary | ICD-10-CM

## 2025-02-11 NOTE — PROGRESS NOTES
Pharmacy Progress Note     Pt's Rx assistance Ozempic 0.5 mg shipment has been delayed.      Provided sample Ozempic 0.5 mg pen for pt to tide her over until her shipment arrives.    There are no discontinued medications.  No orders of the defined types were placed in this encounter.        Shahnaz Jeffries, PharmD, BCGP, BCACP  Clinical Pharmacist Specialist      For Pharmacy Admin Tracking Only    Program: Medical Group  CPA in place:  No  Recommendation Provided To: Patient/Caregiver: 1 via In person  Intervention Detail: Patient Access Assistance/Sample Provided  Intervention Accepted By: Patient/Caregiver: 1  Time Spent (min): 10

## 2025-02-12 DIAGNOSIS — E78.2 MIXED HYPERLIPIDEMIA: ICD-10-CM

## 2025-02-12 RX ORDER — EZETIMIBE 10 MG/1
10 TABLET ORAL DAILY
Qty: 90 TABLET | Refills: 0 | Status: SHIPPED | OUTPATIENT
Start: 2025-02-12

## 2025-02-21 ENCOUNTER — TELEPHONE (OUTPATIENT)
Dept: PRIMARY CARE CLINIC | Facility: CLINIC | Age: 75
End: 2025-02-21

## 2025-02-21 NOTE — TELEPHONE ENCOUNTER
Called and spoke with the patient, notified her that the medication Ozempic has arrived- 4 boxes.

## 2025-02-23 DIAGNOSIS — N39.498 OTHER URINARY INCONTINENCE: ICD-10-CM

## 2025-02-23 RX ORDER — OXYBUTYNIN CHLORIDE 5 MG/1
5 TABLET ORAL 2 TIMES DAILY
Qty: 60 TABLET | Refills: 0 | Status: SHIPPED | OUTPATIENT
Start: 2025-02-23

## 2025-03-11 ENCOUNTER — TELEPHONE (OUTPATIENT)
Facility: HOSPITAL | Age: 75
End: 2025-03-11

## 2025-03-11 ENCOUNTER — TELEPHONE (OUTPATIENT)
Age: 75
End: 2025-03-11

## 2025-03-11 DIAGNOSIS — C50.912 BREAST CANCER, STAGE 2, LEFT (HCC): Primary | ICD-10-CM

## 2025-03-11 NOTE — TELEPHONE ENCOUNTER
[facility]  Breast Cancer Nurse Navigator Encounter    Name: Nereyda Rivas  Age: 74 y.o.  : 1950  Diagnosis: Left breast IDC; ER+/IA+/HER2-    Encounter type:  [x]Initial Navigator Encounter  []Patient Initiated  []Navigator Follow-up  []Other:     Narrative:   Called pt for initial Navigator encounter and to discuss upcoming consult with Dr. Olguin. She understands a breast MRI has been ordered. She lives in [place] and would prefer to have this done at the [facility]. Scheduled her breast MRI for Friday, 3/14, at 10am, at [facility]. Pt confirms date/time/location. MRI screening questions answered and forwarded to Imaging RN. Pt understands that Dr. Olguin will discuss results with her at the appointment on Monday. Provided pt with my contact info and encouraged her to reach out as needed.     Interdisciplinary Team:  Surg-Onc: Brandon Olguin MD  Med-Onc:  Rad-Onc:  Plastics:  :  Nurse Navigator: PRAMOD Gil BSN, RN, CMSRN  Breast Cancer Nurse Navigator    [facility]  [address]   [address]  [place] [address]  W: [phone]  F: [phone]  [email or web address]   Good Help to Those in Need®

## 2025-03-12 DIAGNOSIS — I10 ESSENTIAL HYPERTENSION, BENIGN: ICD-10-CM

## 2025-03-13 RX ORDER — AMLODIPINE BESYLATE 5 MG/1
5 TABLET ORAL DAILY
Qty: 90 TABLET | Refills: 0 | Status: SHIPPED | OUTPATIENT
Start: 2025-03-13 | End: 2025-06-11

## 2025-03-13 NOTE — TELEPHONE ENCOUNTER
Requested Prescriptions     Pending Prescriptions Disp Refills    amLODIPine (NORVASC) 5 MG tablet [Pharmacy Med Name: amLODIPine Besylate Oral Tablet 5 MG] 90 tablet 0     Sig: Take 1 tablet by mouth daily        Last Visit 1/28/25  Last Refill 12/15/24

## 2025-03-14 ENCOUNTER — HOSPITAL ENCOUNTER (OUTPATIENT)
Age: 75
Discharge: HOME | End: 2025-03-17
Payer: MEDICARE

## 2025-03-14 DIAGNOSIS — C50.912 BREAST CANCER, STAGE 2, LEFT: ICD-10-CM

## 2025-03-14 PROCEDURE — 6360000004 HC RX CONTRAST MEDICATION: Performed by: RADIOLOGY

## 2025-03-14 PROCEDURE — A9585 GADOBUTROL INJECTION: HCPCS | Performed by: RADIOLOGY

## 2025-03-14 PROCEDURE — C8908 MRI W/O FOL W/CONT, BREAST,: HCPCS

## 2025-03-14 RX ORDER — GADOBUTROL 604.72 MG/ML
9 INJECTION INTRAVENOUS
Status: COMPLETED | OUTPATIENT
Start: 2025-03-14 | End: 2025-03-14

## 2025-03-14 RX ADMIN — GADOBUTROL 9 ML: 604.72 INJECTION INTRAVENOUS at 10:40

## 2025-03-17 ENCOUNTER — TELEPHONE (OUTPATIENT)
Age: 75
End: 2025-03-17

## 2025-03-17 ENCOUNTER — OFFICE VISIT (OUTPATIENT)
Age: 75
End: 2025-03-17
Payer: MEDICARE

## 2025-03-17 ENCOUNTER — CLINICAL DOCUMENTATION (OUTPATIENT)
Age: 75
End: 2025-03-17

## 2025-03-17 VITALS — HEIGHT: 64 IN | BODY MASS INDEX: 30.73 KG/M2 | WEIGHT: 180 LBS

## 2025-03-17 DIAGNOSIS — C50.912 INVASIVE DUCTAL CARCINOMA OF BREAST, FEMALE, LEFT: Primary | ICD-10-CM

## 2025-03-17 PROCEDURE — G8417 CALC BMI ABV UP PARAM F/U: HCPCS | Performed by: SURGERY

## 2025-03-17 PROCEDURE — 1090F PRES/ABSN URINE INCON ASSESS: CPT | Performed by: SURGERY

## 2025-03-17 PROCEDURE — 1159F MED LIST DOCD IN RCRD: CPT | Performed by: SURGERY

## 2025-03-17 PROCEDURE — G8400 PT W/DXA NO RESULTS DOC: HCPCS | Performed by: SURGERY

## 2025-03-17 PROCEDURE — G8427 DOCREV CUR MEDS BY ELIG CLIN: HCPCS | Performed by: SURGERY

## 2025-03-17 PROCEDURE — 99205 OFFICE O/P NEW HI 60 MIN: CPT | Performed by: SURGERY

## 2025-03-17 PROCEDURE — 1123F ACP DISCUSS/DSCN MKR DOCD: CPT | Performed by: SURGERY

## 2025-03-17 PROCEDURE — 3017F COLORECTAL CA SCREEN DOC REV: CPT | Performed by: SURGERY

## 2025-03-17 PROCEDURE — 76642 ULTRASOUND BREAST LIMITED: CPT | Performed by: SURGERY

## 2025-03-17 PROCEDURE — G9899 SCRN MAM PERF RSLTS DOC: HCPCS | Performed by: SURGERY

## 2025-03-17 PROCEDURE — 1036F TOBACCO NON-USER: CPT | Performed by: SURGERY

## 2025-03-17 PROCEDURE — 93702 BIS XTRACELL FLUID ANALYSIS: CPT | Performed by: SURGERY

## 2025-03-17 RX ORDER — ASPIRIN 81 MG/1
81 TABLET, CHEWABLE ORAL EVERY OTHER DAY
COMMUNITY

## 2025-03-17 NOTE — PROGRESS NOTES
1.0 x 0.8 x 1.0 cm exhibiting initial fast and delayed washout  kinetics. This is biopsy-proven invasive ductal carcinoma. Posterior and medial  to this an irregular heterogeneously enhancing T2 hyperintense mass measuring  0.8 x 0.5 x 0.7 cm exhibiting initial fast and delayed washout kinetics. This  represents biopsy-proven ductal carcinoma in situ. Signal void from biopsy  markers are present along the posterior aspects of the masses. Collectively the  masses measure 2.0 x 1.4 x 0.7 cm. The masses are widely clear of the skin,  nipple areolar complex, and chest wall. No additional masses or suspicious  enhancement in the left breast.     EXTRAMAMMARY:  A mildly prominent left internal mammary lymph node is nonspecific. No  morphologically abnormal axillary or internal mammary lymph nodes. Visualized  soft tissues are within normal limits.     Impression  1. 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  2. A mildly prominent left internal mammary lymph nodes is nonspecific.  3. No findings suspicious for malignancy in the right breast.     BI-RADS Assessment Category 6: Known biopsy proven malignancy- Appropriate  action should be taken.     RECOMMENDATION:  1. Further management by surgical oncology is expected.              A negative breast MRI examination speaks strongly against invasive cancer down  to a detection threshold of 3 to 5 mm but may not detect some lower grade or in  situ carcinomas. Therefore, routine clinical and mammographic follow up are  recommended.        Electronically signed by Colten Velasco           2/25/25: LEFT breast, biopsy: DCIS, grade 2, with focal necrosis and calcifications measuring at least 4mm. ER+(90%)/VA+(3-5%)/HER2-, Ki-67 42%  - LEFT breast, 7:00 6cm FN, biopsy: IDC, grade 2, measuring at least 4mm. DCIS, grade 2.       Past Medical History:   Diagnosis Date    Esophageal reflux 
                 The patient had a baseline SOZO measurement which I reviewed today. The score is Within normal limits, see flowsheet in EMR.     Review of Systems       Physical Exam       ASSESSMENT and PLAN  {Assessment and Plan Chronic Disease:[id number]}

## 2025-03-17 NOTE — TELEPHONE ENCOUNTER
Mammaprint    
The patient is a 56 year old female with a PMHx of DM , asthma and PTSD presenting to the ED with right sided back pain.  Patient was seen at [facility] earlier this morning regarding same sx and had CT workup showing questionable mass on the left kidney.  She was given sx treatment and DM management and discharged home.  patient states she went home and the pain persisted.  Notes that the pain is sharp, intermittent and radiates to her right groin, chills.  Denies dysuria, cough, SOB or CP.  No recent travel or sick contacts.  Patient states similar sx in past due to a UTI.  Blood sugar on arrival was 500+.

## 2025-03-18 ENCOUNTER — TELEPHONE (OUTPATIENT)
Age: 75
End: 2025-03-18

## 2025-03-18 NOTE — TELEPHONE ENCOUNTER
[facility] Oncology Social Work   Psychosocial Assessment      Location: [facility]  Patient: Nereyda Rivas (1950)    Reason for Assessment: Initial Assessment, New Diagnosis    Advance Care Planning: ACP conversation deferred at this time    Sources of Information: Patient, Medical Record    Mental Status: Alert, Oriented to Person, Oriented to Place, Oriented to Time, Oriented to Situation    Relationship Status:     Living Circumstances: Family/Significant Other in Household    Employment Status: Retired    Transportation Resources: Self, Family/Friends    Barriers to Learning: No Barriers Identified    Financial/Legal Concerns: No Concerns Identified    Yarsanism/Spiritual/Existential: Conversation deferred    Support System: Strong Support: The patient has a reliable, consistent, and engaged network of family, friends, or community resources that effectively meet their emotional, social, and practical needs.  Patient's Primary Support Person/Group: Spouse    History of Mental Health / Substance Use Disorders: Unknown  Conversation deferred    Coping with Illness: Coping Well, Situational Anxiety           Concerns/Barriers to Care: n/a    Narrative:   Called the patient this afternoon to introduce self and role on the care team. Pt reports her appointment going well yesterday and that she is feeling eager to have her surgery scheduled and move forward.    Pt is  and lives in her private residence with her spouse and one of her adult sons who is on the autism spectrum. Pt has another son who lives locally as well.     Pt reports no current emotional, practical, or financial concerns at the time. She is awaiting a call from the surgery scheduler to have a surgery date. TEQUILA made pt aware that her surgery order is in her chart and that she will hear from  soon.     TEQUILA encouraged pt to reach out for ongoing psychosocial support and assistance. She was appreciative and noted

## 2025-03-23 DIAGNOSIS — N39.498 OTHER URINARY INCONTINENCE: ICD-10-CM

## 2025-03-23 RX ORDER — OXYBUTYNIN CHLORIDE 5 MG/1
5 TABLET ORAL 2 TIMES DAILY
Qty: 60 TABLET | Refills: 0 | Status: SHIPPED | OUTPATIENT
Start: 2025-03-23

## 2025-03-24 ENCOUNTER — PREP FOR PROCEDURE (OUTPATIENT)
Age: 75
End: 2025-03-24

## 2025-03-24 DIAGNOSIS — C50.912 MALIGNANT NEOPLASM OF LEFT BREAST IN FEMALE, ESTROGEN RECEPTOR POSITIVE, UNSPECIFIED SITE OF BREAST (HCC): Primary | ICD-10-CM

## 2025-03-24 DIAGNOSIS — Z17.0 MALIGNANT NEOPLASM OF LEFT BREAST IN FEMALE, ESTROGEN RECEPTOR POSITIVE, UNSPECIFIED SITE OF BREAST (HCC): Primary | ICD-10-CM

## 2025-03-24 DIAGNOSIS — C50.912 MALIGNANT NEOPLASM OF LEFT BREAST IN FEMALE, ESTROGEN RECEPTOR POSITIVE, UNSPECIFIED SITE OF BREAST: Primary | ICD-10-CM

## 2025-03-24 DIAGNOSIS — Z17.0 MALIGNANT NEOPLASM OF LEFT BREAST IN FEMALE, ESTROGEN RECEPTOR POSITIVE, UNSPECIFIED SITE OF BREAST: Primary | ICD-10-CM

## 2025-03-24 DIAGNOSIS — N39.498 OTHER URINARY INCONTINENCE: ICD-10-CM

## 2025-03-24 RX ORDER — OXYBUTYNIN CHLORIDE 5 MG/1
5 TABLET ORAL 2 TIMES DAILY
Qty: 60 TABLET | Refills: 0 | OUTPATIENT
Start: 2025-03-24

## 2025-03-26 ENCOUNTER — CLINICAL DOCUMENTATION (OUTPATIENT)
Age: 75
End: 2025-03-26

## 2025-03-26 ENCOUNTER — TELEPHONE (OUTPATIENT)
Facility: HOSPITAL | Age: 75
End: 2025-03-26

## 2025-03-26 DIAGNOSIS — C50.912 MALIGNANT NEOPLASM OF LEFT BREAST IN FEMALE, ESTROGEN RECEPTOR POSITIVE, UNSPECIFIED SITE OF BREAST: Primary | ICD-10-CM

## 2025-03-26 DIAGNOSIS — Z17.0 MALIGNANT NEOPLASM OF LEFT BREAST IN FEMALE, ESTROGEN RECEPTOR POSITIVE, UNSPECIFIED SITE OF BREAST: Primary | ICD-10-CM

## 2025-03-26 NOTE — PROGRESS NOTES
Received message from Lacey in WIC at [facility] asking which area to address with LEFT NORMA or to do bracketed. Per Dr. Olguin he would like LEFT renetta. Message sent to Lacey with update.

## 2025-03-26 NOTE — TELEPHONE ENCOUNTER
[facility]  Breast Cancer Nurse Navigator Encounter    Name: Nereyda Rivas  Age: 74 y.o.  : 1950  Diagnosis: Left breast IDC; ER+/IN+/HER2-    Encounter type:  []Initial Navigator Encounter  [x]Patient Initiated  []Navigator Follow-up  []Other:     Narrative:   Pt called with questions she is receiving about scheduling an appointment in nuclear medicine. She says she has been called multiple times by someone saying she is needing to make an appointment in nuc med to check estrogen markers. She then received a call from the same person saying she called in error. The pt called a 3rd time, but I am unsure what she was telling the pt this time. This, understandably, was disconcerting to the patient.     Explained the process for localization and nuc med injection the day of surgery. Pt understands the nuc med injection is to aid Dr. Olguin in locating the sentinel nodes for biopsy. Explained that Dr. Olguin's surgery scheduler will arrange all of this and give pt the information.    I let pt know that what likely happened is the sentinel node biopsy order was entered into the chart and populated into the central scheduling queue. If it was someone who was unfamiliar with this, they may have called her prematurely not realizing it was something to be arranged by the office and department. I apologized for the unnecessary stress it caused her and assured her I would pass along to Dr. Olguin's team.     Interdisciplinary Team:  Surg-Onc: Brandon Olguin MD  Med-Onc:  Rad-Onc:  Plastics:  :  Nurse Navigator: PRAMOD Gil BSN, RN, CMSRN  Breast Cancer Nurse Navigator    [facility]  [address]   [address]  [place] [address]  W: [phone]  F: [phone]  [email or web address]   Good Help to Those in Need®

## 2025-03-27 ENCOUNTER — TELEPHONE (OUTPATIENT)
Age: 75
End: 2025-03-27

## 2025-03-27 NOTE — TELEPHONE ENCOUNTER
Patient sent Fridge message that she was available for call. I called the patient. Confirmed identifiers. Gave low risk mammaprint results and explanation of results. No other questions at this time. Patient appreciative of call back.

## 2025-03-27 NOTE — TELEPHONE ENCOUNTER
I called the patient to give low risk mammaprint results. NO answer. LVM to call our office and ask for David.

## 2025-03-31 DIAGNOSIS — Z17.0 MALIGNANT NEOPLASM OF LEFT BREAST IN FEMALE, ESTROGEN RECEPTOR POSITIVE, UNSPECIFIED SITE OF BREAST: Primary | ICD-10-CM

## 2025-03-31 DIAGNOSIS — C50.912 MALIGNANT NEOPLASM OF LEFT BREAST IN FEMALE, ESTROGEN RECEPTOR POSITIVE, UNSPECIFIED SITE OF BREAST: Primary | ICD-10-CM

## 2025-04-11 ENCOUNTER — HOSPITAL ENCOUNTER (OUTPATIENT)
Facility: HOSPITAL | Age: 75
Discharge: HOME | End: 2025-04-14
Payer: MEDICARE

## 2025-04-11 VITALS
BODY MASS INDEX: 31.21 KG/M2 | DIASTOLIC BLOOD PRESSURE: 86 MMHG | TEMPERATURE: 98 F | SYSTOLIC BLOOD PRESSURE: 148 MMHG | HEART RATE: 88 BPM | RESPIRATION RATE: 18 BRPM | WEIGHT: 182.8 LBS | HEIGHT: 64 IN

## 2025-04-11 LAB
ANION GAP SERPL CALC-SCNC: 8 MMOL/L (ref 2–12)
BASOPHILS # BLD: 0.05 K/UL (ref 0–0.1)
BASOPHILS NFR BLD: 0.8 % (ref 0–1)
BUN SERPL-MCNC: 11 MG/DL (ref 6–20)
BUN/CREAT SERPL: 14 (ref 12–20)
CALCIUM SERPL-MCNC: 10.4 MG/DL (ref 8.5–10.1)
CHLORIDE SERPL-SCNC: 102 MMOL/L (ref 97–108)
CO2 SERPL-SCNC: 28 MMOL/L (ref 21–32)
CREAT SERPL-MCNC: 0.81 MG/DL (ref 0.55–1.02)
DIFFERENTIAL METHOD BLD: ABNORMAL
EKG ATRIAL RATE: 89 BPM
EKG DIAGNOSIS: NORMAL
EKG P AXIS: 4 DEGREES
EKG P-R INTERVAL: 140 MS
EKG Q-T INTERVAL: 396 MS
EKG QRS DURATION: 132 MS
EKG QTC CALCULATION (BAZETT): 481 MS
EKG R AXIS: 27 DEGREES
EKG T AXIS: 4 DEGREES
EKG VENTRICULAR RATE: 89 BPM
EOSINOPHIL # BLD: 0.17 K/UL (ref 0–0.4)
EOSINOPHIL NFR BLD: 2.7 % (ref 0–7)
ERYTHROCYTE [DISTWIDTH] IN BLOOD BY AUTOMATED COUNT: 12.9 % (ref 11.5–14.5)
EST. AVERAGE GLUCOSE BLD GHB EST-MCNC: 105 MG/DL
GLUCOSE SERPL-MCNC: 101 MG/DL (ref 65–100)
HBA1C MFR BLD: 5.3 % (ref 4–5.6)
HCT VFR BLD AUTO: 39.3 % (ref 35–47)
HGB BLD-MCNC: 13.4 G/DL (ref 11.5–16)
IMM GRANULOCYTES # BLD AUTO: 0.02 K/UL (ref 0–0.04)
IMM GRANULOCYTES NFR BLD AUTO: 0.3 % (ref 0–0.5)
LYMPHOCYTES # BLD: 2.39 K/UL (ref 0.8–3.5)
LYMPHOCYTES NFR BLD: 37.6 % (ref 12–49)
MCH RBC QN AUTO: 32.1 PG (ref 26–34)
MCHC RBC AUTO-ENTMCNC: 34.1 G/DL (ref 30–36.5)
MCV RBC AUTO: 94 FL (ref 80–99)
MONOCYTES # BLD: 0.34 K/UL (ref 0–1)
MONOCYTES NFR BLD: 5.4 % (ref 5–13)
NEUTS SEG # BLD: 3.38 K/UL (ref 1.8–8)
NEUTS SEG NFR BLD: 53.2 % (ref 32–75)
NRBC # BLD: 0 K/UL (ref 0–0.01)
NRBC BLD-RTO: 0 PER 100 WBC
PLATELET # BLD AUTO: 416 K/UL (ref 150–400)
PMV BLD AUTO: 10.4 FL (ref 8.9–12.9)
POTASSIUM SERPL-SCNC: 3.9 MMOL/L (ref 3.5–5.1)
RBC # BLD AUTO: 4.18 M/UL (ref 3.8–5.2)
SODIUM SERPL-SCNC: 138 MMOL/L (ref 136–145)
WBC # BLD AUTO: 6.4 K/UL (ref 3.6–11)

## 2025-04-11 PROCEDURE — 83036 HEMOGLOBIN GLYCOSYLATED A1C: CPT

## 2025-04-11 PROCEDURE — 93005 ELECTROCARDIOGRAM TRACING: CPT | Performed by: SURGERY

## 2025-04-11 PROCEDURE — 85025 COMPLETE CBC W/AUTO DIFF WBC: CPT

## 2025-04-11 PROCEDURE — 80048 BASIC METABOLIC PNL TOTAL CA: CPT

## 2025-04-11 NOTE — PERIOP NOTE
[address] [address]      MAIN PRE OP             [phone]                                                                                AMBULATORY PRE OP          [phone]    PRE-ADMISSION TESTING    [phone]     Surgery Date:  4/17/25        [facility] staff will call you between 4 and 7pm the day before your surgery with your arrival time. (If your surgery is on a Monday, we will call you the Friday before.)    Call [phone] after 7pm Monday-Friday if you did not receive this call.    INSTRUCTIONS BEFORE YOUR SURGERY   When You  Arrive Arrive at [facility] Patient Access on 1st floor the day of your surgery.  Have your insurance card, photo ID,living will/advanced directive/POA (if applicable),  and any copayment (if needed)   Food   and   Drink NO solid food after midnight the night before surgery. You can drink clear liquids from midnight until ONE hour prior to your arrival at the hospital on the day of your surgery. Clear liquids include:  Water  Apple juice (no sediment)  Carbonated beverages  Black coffee(no cream/milk)  Tea(no cream/milk)  Gatorade    No alcohol (beer, wine, liquor) or marijuana (smoking) 24 hours prior to surgery.   No edibles for 3 days prior to surgery.    Stop smoking cigarettes 14 days before surgery (helps w/healing and breathing).   Medications to   TAKE   Morning of Surgery MEDICATIONS TO TAKE THE MORNING OF SURGERY: OXYBUTYNIN    You may take these medications, IF NEEDED, the morning of surgery: NONE    Ask your surgeon/prescribing doctor for instructions on taking or stopping these medications prior to surgery: NONE   Medications to STOP  before surgery Non-Steroidal anti-inflammatory Drugs (NSAID's): for example, Diclofenac (Voltaren), Ibuprofen (Advil, Motrin), Naproxen (Aleve) 3 days    STOP all herbal supplements and vitamins(unless prescribed by your doctor), and fish oil for 7  days    Other: DO NOT TAKE LOSARTAN-HCTZ ON THE DAY OF SURGERY;   HOLD METFORMIN ON THE DAY OF SURGERY;  HOLD OZEMPIC 7 DAYS PRIOR TO SURGERY    (Pain medications not listed above, including Tylenol may be taken up until 4 hours prior to arrival time)   Blood  Thinners If you take Aspirin, Eliquis, Plavix, Coumadin, or any blood-thinning or anti-blood clot medicine, talk to the doctor who prescribed the medications for pre-operative instructions.  If you take aspirin or aspirin containing products for pain, stop 7 days prior to surgery   Bathing Clothing  Jewelry  Valuables     When you shower the morning of surgery, please do not apply anything to your skin, such as lotions, powders or makeup, (especially mascara).   No deodorant if you are having breast surgery.  Remove fingernail polish except for clear.  Do not shave or trim anywhere 24 hours before surgery  Wear your hair loose or down; no pony-tails, buns, or metal hair clips  Wear loose, comfortable, clean clothes  Wear glasses instead of contacts. Bring a case to keep your glasses safe.  Leave money, valuables, and jewelry, including body piercings, at home  If you use inhalers or CPAP machine, bring it with you the day of surgery.     Going Home - or Spending the Night OUTPATIENT SURGERY: You must have a responsible adult drive you home and stay with you 24 hours after surgery. You may not drive for 24 hours after surgery.  ADMITS: If your doctor is keeping you in the hospital after surgery, leave personal belongings/luggage in your car until you have a hospital room number.    Hospital discharge time is 12 noon  Drivers must be here before 12 noon unless you are told differently   Special Instructions Free  parking available from 6 AM until 4:30 PM.         Preventing Infections Before and After - Your Surgery    IMPORTANT INSTRUCTIONS      You play an important role in your health and preparation for surgery. To reduce the germs on your skin you will

## 2025-04-16 RX ORDER — FENTANYL CITRATE 50 UG/ML
25 INJECTION, SOLUTION INTRAMUSCULAR; INTRAVENOUS EVERY 5 MIN PRN
Refills: 0 | Status: CANCELLED | OUTPATIENT
Start: 2025-04-16

## 2025-04-16 RX ORDER — HYDROMORPHONE HYDROCHLORIDE 1 MG/ML
0.5 INJECTION, SOLUTION INTRAMUSCULAR; INTRAVENOUS; SUBCUTANEOUS EVERY 5 MIN PRN
Refills: 0 | Status: CANCELLED | OUTPATIENT
Start: 2025-04-16

## 2025-04-16 RX ORDER — SODIUM CHLORIDE 0.9 % (FLUSH) 0.9 %
5-40 SYRINGE (ML) INJECTION EVERY 12 HOURS SCHEDULED
Status: CANCELLED | OUTPATIENT
Start: 2025-04-16

## 2025-04-16 RX ORDER — NALOXONE HYDROCHLORIDE 0.4 MG/ML
INJECTION, SOLUTION INTRAMUSCULAR; INTRAVENOUS; SUBCUTANEOUS PRN
Status: CANCELLED | OUTPATIENT
Start: 2025-04-16

## 2025-04-16 RX ORDER — OXYCODONE HYDROCHLORIDE 5 MG/1
5 TABLET ORAL
Refills: 0 | Status: CANCELLED | OUTPATIENT
Start: 2025-04-16

## 2025-04-16 RX ORDER — HYDRALAZINE HYDROCHLORIDE 20 MG/ML
10 INJECTION INTRAMUSCULAR; INTRAVENOUS ONCE
Status: CANCELLED | OUTPATIENT
Start: 2025-04-16 | End: 2025-04-16

## 2025-04-16 RX ORDER — SODIUM CHLORIDE 0.9 % (FLUSH) 0.9 %
5-40 SYRINGE (ML) INJECTION PRN
Status: CANCELLED | OUTPATIENT
Start: 2025-04-16

## 2025-04-16 RX ORDER — SODIUM CHLORIDE 9 MG/ML
INJECTION, SOLUTION INTRAVENOUS PRN
Status: CANCELLED | OUTPATIENT
Start: 2025-04-16

## 2025-04-16 RX ORDER — PROCHLORPERAZINE EDISYLATE 5 MG/ML
5 INJECTION INTRAMUSCULAR; INTRAVENOUS
Status: CANCELLED | OUTPATIENT
Start: 2025-04-16

## 2025-04-16 RX ORDER — ONDANSETRON 2 MG/ML
4 INJECTION INTRAMUSCULAR; INTRAVENOUS
Status: CANCELLED | OUTPATIENT
Start: 2025-04-16

## 2025-04-17 ENCOUNTER — ANESTHESIA (OUTPATIENT)
Facility: HOSPITAL | Age: 75
End: 2025-04-17
Payer: MEDICARE

## 2025-04-17 ENCOUNTER — ANESTHESIA EVENT (OUTPATIENT)
Facility: HOSPITAL | Age: 75
End: 2025-04-17
Payer: MEDICARE

## 2025-04-17 ENCOUNTER — APPOINTMENT (OUTPATIENT)
Facility: HOSPITAL | Age: 75
End: 2025-04-17
Attending: SURGERY
Payer: MEDICARE

## 2025-04-17 ENCOUNTER — HOSPITAL ENCOUNTER (OUTPATIENT)
Facility: HOSPITAL | Age: 75
Setting detail: OUTPATIENT SURGERY
Discharge: HOME | End: 2025-04-17
Attending: SURGERY | Admitting: SURGERY
Payer: MEDICARE

## 2025-04-17 VITALS
DIASTOLIC BLOOD PRESSURE: 77 MMHG | TEMPERATURE: 97.8 F | HEART RATE: 108 BPM | RESPIRATION RATE: 21 BRPM | SYSTOLIC BLOOD PRESSURE: 150 MMHG | OXYGEN SATURATION: 92 % | WEIGHT: 180.6 LBS | BODY MASS INDEX: 31 KG/M2

## 2025-04-17 DIAGNOSIS — C50.912 MALIGNANT NEOPLASM OF LEFT BREAST IN FEMALE, ESTROGEN RECEPTOR POSITIVE, UNSPECIFIED SITE OF BREAST (HCC): ICD-10-CM

## 2025-04-17 DIAGNOSIS — Z17.0 MALIGNANT NEOPLASM OF LEFT BREAST IN FEMALE, ESTROGEN RECEPTOR POSITIVE, UNSPECIFIED SITE OF BREAST (HCC): ICD-10-CM

## 2025-04-17 LAB
GLUCOSE BLD STRIP.AUTO-MCNC: 106 MG/DL (ref 65–117)
GLUCOSE BLD STRIP.AUTO-MCNC: 114 MG/DL (ref 65–117)
SERVICE CMNT-IMP: NORMAL
SERVICE CMNT-IMP: NORMAL

## 2025-04-17 PROCEDURE — 6360000002 HC RX W HCPCS: Performed by: NURSE ANESTHETIST, CERTIFIED REGISTERED

## 2025-04-17 PROCEDURE — 6360000002 HC RX W HCPCS: Performed by: SURGERY

## 2025-04-17 PROCEDURE — 2580000003 HC RX 258: Performed by: STUDENT IN AN ORGANIZED HEALTH CARE EDUCATION/TRAINING PROGRAM

## 2025-04-17 PROCEDURE — 2720000010 HC SURG SUPPLY STERILE: Performed by: SURGERY

## 2025-04-17 PROCEDURE — 38900 IO MAP OF SENT LYMPH NODE: CPT | Performed by: SURGERY

## 2025-04-17 PROCEDURE — 3700000000 HC ANESTHESIA ATTENDED CARE: Performed by: SURGERY

## 2025-04-17 PROCEDURE — 6360000002 HC RX W HCPCS: Performed by: RADIOLOGY

## 2025-04-17 PROCEDURE — 3700000001 HC ADD 15 MINUTES (ANESTHESIA): Performed by: SURGERY

## 2025-04-17 PROCEDURE — 3600000013 HC SURGERY LEVEL 3 ADDTL 15MIN: Performed by: SURGERY

## 2025-04-17 PROCEDURE — 3600000003 HC SURGERY LEVEL 3 BASE: Performed by: SURGERY

## 2025-04-17 PROCEDURE — 2500000003 HC RX 250 WO HCPCS: Performed by: SURGERY

## 2025-04-17 PROCEDURE — 7100000000 HC PACU RECOVERY - FIRST 15 MIN: Performed by: SURGERY

## 2025-04-17 PROCEDURE — 2709999900 HC NON-CHARGEABLE SUPPLY: Performed by: SURGERY

## 2025-04-17 PROCEDURE — 3430000000 HC RX DIAGNOSTIC RADIOPHARMACEUTICAL: Performed by: SURGERY

## 2025-04-17 PROCEDURE — 88307 TISSUE EXAM BY PATHOLOGIST: CPT

## 2025-04-17 PROCEDURE — 19283 PERQ DEV BREAST 1ST STRTCTC: CPT

## 2025-04-17 PROCEDURE — A9520 TC99 TILMANOCEPT DIAG 0.5MCI: HCPCS | Performed by: SURGERY

## 2025-04-17 PROCEDURE — 19301 PARTIAL MASTECTOMY: CPT | Performed by: SURGERY

## 2025-04-17 PROCEDURE — 82962 GLUCOSE BLOOD TEST: CPT

## 2025-04-17 PROCEDURE — C1819 TISSUE LOCALIZATION-EXCISION: HCPCS

## 2025-04-17 PROCEDURE — 38525 BIOPSY/REMOVAL LYMPH NODES: CPT | Performed by: SURGERY

## 2025-04-17 PROCEDURE — 2500000003 HC RX 250 WO HCPCS: Performed by: NURSE ANESTHETIST, CERTIFIED REGISTERED

## 2025-04-17 PROCEDURE — 78195 LYMPH SYSTEM IMAGING: CPT

## 2025-04-17 PROCEDURE — 7100000001 HC PACU RECOVERY - ADDTL 15 MIN: Performed by: SURGERY

## 2025-04-17 RX ORDER — EPHEDRINE SULFATE 50 MG/ML
INJECTION INTRAVENOUS
Status: DISCONTINUED | OUTPATIENT
Start: 2025-04-17 | End: 2025-04-17 | Stop reason: SDUPTHER

## 2025-04-17 RX ORDER — LIDOCAINE HYDROCHLORIDE 10 MG/ML
5 INJECTION, SOLUTION INFILTRATION; PERINEURAL ONCE
Status: COMPLETED | OUTPATIENT
Start: 2025-04-17 | End: 2025-04-17

## 2025-04-17 RX ORDER — PHENYLEPHRINE HCL IN 0.9% NACL 0.4MG/10ML
SYRINGE (ML) INTRAVENOUS
Status: DISCONTINUED | OUTPATIENT
Start: 2025-04-17 | End: 2025-04-17 | Stop reason: SDUPTHER

## 2025-04-17 RX ORDER — OXYCODONE AND ACETAMINOPHEN 5; 325 MG/1; MG/1
1 TABLET ORAL EVERY 4 HOURS PRN
Qty: 15 TABLET | Refills: 0 | Status: SHIPPED | OUTPATIENT
Start: 2025-04-17 | End: 2025-04-20

## 2025-04-17 RX ORDER — SODIUM CHLORIDE 9 MG/ML
INJECTION, SOLUTION INTRAVENOUS PRN
Status: DISCONTINUED | OUTPATIENT
Start: 2025-04-17 | End: 2025-04-17 | Stop reason: HOSPADM

## 2025-04-17 RX ORDER — LIDOCAINE HYDROCHLORIDE 20 MG/ML
INJECTION, SOLUTION EPIDURAL; INFILTRATION; INTRACAUDAL; PERINEURAL
Status: DISCONTINUED | OUTPATIENT
Start: 2025-04-17 | End: 2025-04-17 | Stop reason: SDUPTHER

## 2025-04-17 RX ORDER — ACETAMINOPHEN 500 MG
1000 TABLET ORAL ONCE
Status: DISCONTINUED | OUTPATIENT
Start: 2025-04-17 | End: 2025-04-17 | Stop reason: HOSPADM

## 2025-04-17 RX ORDER — LIDOCAINE HYDROCHLORIDE 10 MG/ML
1 INJECTION, SOLUTION EPIDURAL; INFILTRATION; INTRACAUDAL; PERINEURAL
Status: DISCONTINUED | OUTPATIENT
Start: 2025-04-17 | End: 2025-04-17 | Stop reason: HOSPADM

## 2025-04-17 RX ORDER — MIDAZOLAM HYDROCHLORIDE 1 MG/ML
INJECTION, SOLUTION INTRAMUSCULAR; INTRAVENOUS
Status: DISCONTINUED | OUTPATIENT
Start: 2025-04-17 | End: 2025-04-17 | Stop reason: SDUPTHER

## 2025-04-17 RX ORDER — FENTANYL CITRATE 50 UG/ML
INJECTION, SOLUTION INTRAMUSCULAR; INTRAVENOUS
Status: DISCONTINUED | OUTPATIENT
Start: 2025-04-17 | End: 2025-04-17 | Stop reason: SDUPTHER

## 2025-04-17 RX ORDER — SODIUM CHLORIDE 0.9 % (FLUSH) 0.9 %
5-40 SYRINGE (ML) INJECTION PRN
Status: DISCONTINUED | OUTPATIENT
Start: 2025-04-17 | End: 2025-04-17 | Stop reason: HOSPADM

## 2025-04-17 RX ORDER — LIDOCAINE HYDROCHLORIDE AND EPINEPHRINE 10; 10 MG/ML; UG/ML
30 INJECTION, SOLUTION INFILTRATION; PERINEURAL ONCE
Status: DISCONTINUED | OUTPATIENT
Start: 2025-04-17 | End: 2025-04-17 | Stop reason: HOSPADM

## 2025-04-17 RX ORDER — MIDAZOLAM HYDROCHLORIDE 2 MG/2ML
2 INJECTION, SOLUTION INTRAMUSCULAR; INTRAVENOUS PRN
Status: DISCONTINUED | OUTPATIENT
Start: 2025-04-17 | End: 2025-04-17 | Stop reason: HOSPADM

## 2025-04-17 RX ORDER — FENTANYL CITRATE 50 UG/ML
100 INJECTION, SOLUTION INTRAMUSCULAR; INTRAVENOUS
Status: DISCONTINUED | OUTPATIENT
Start: 2025-04-17 | End: 2025-04-17 | Stop reason: HOSPADM

## 2025-04-17 RX ORDER — BUPIVACAINE HYDROCHLORIDE 5 MG/ML
30 INJECTION, SOLUTION EPIDURAL; INTRACAUDAL ONCE
Status: DISCONTINUED | OUTPATIENT
Start: 2025-04-17 | End: 2025-04-17 | Stop reason: HOSPADM

## 2025-04-17 RX ORDER — ONDANSETRON 2 MG/ML
INJECTION INTRAMUSCULAR; INTRAVENOUS
Status: DISCONTINUED | OUTPATIENT
Start: 2025-04-17 | End: 2025-04-17 | Stop reason: SDUPTHER

## 2025-04-17 RX ORDER — SODIUM CHLORIDE 0.9 % (FLUSH) 0.9 %
5-40 SYRINGE (ML) INJECTION EVERY 12 HOURS SCHEDULED
Status: DISCONTINUED | OUTPATIENT
Start: 2025-04-17 | End: 2025-04-17 | Stop reason: HOSPADM

## 2025-04-17 RX ORDER — DEXAMETHASONE SODIUM PHOSPHATE 4 MG/ML
INJECTION, SOLUTION INTRA-ARTICULAR; INTRALESIONAL; INTRAMUSCULAR; INTRAVENOUS; SOFT TISSUE
Status: DISCONTINUED | OUTPATIENT
Start: 2025-04-17 | End: 2025-04-17 | Stop reason: SDUPTHER

## 2025-04-17 RX ORDER — SODIUM CHLORIDE, SODIUM LACTATE, POTASSIUM CHLORIDE, CALCIUM CHLORIDE 600; 310; 30; 20 MG/100ML; MG/100ML; MG/100ML; MG/100ML
INJECTION, SOLUTION INTRAVENOUS CONTINUOUS
Status: DISCONTINUED | OUTPATIENT
Start: 2025-04-17 | End: 2025-04-17 | Stop reason: HOSPADM

## 2025-04-17 RX ADMIN — MIDAZOLAM 2 MG: 1 INJECTION INTRAMUSCULAR; INTRAVENOUS at 14:09

## 2025-04-17 RX ADMIN — LIDOCAINE HYDROCHLORIDE 5 ML: 10 INJECTION, SOLUTION INFILTRATION; PERINEURAL at 11:00

## 2025-04-17 RX ADMIN — EPHEDRINE SULFATE 10 MG: 50 INJECTION INTRAVENOUS at 14:45

## 2025-04-17 RX ADMIN — WATER 2000 MG: 1 INJECTION INTRAMUSCULAR; INTRAVENOUS; SUBCUTANEOUS at 14:26

## 2025-04-17 RX ADMIN — LIDOCAINE HYDROCHLORIDE 80 MG: 20 INJECTION, SOLUTION EPIDURAL; INFILTRATION; INTRACAUDAL; PERINEURAL at 14:18

## 2025-04-17 RX ADMIN — PROPOFOL 100 MG: 10 INJECTION, EMULSION INTRAVENOUS at 14:37

## 2025-04-17 RX ADMIN — FENTANYL CITRATE 25 MCG: 50 INJECTION INTRAMUSCULAR; INTRAVENOUS at 15:10

## 2025-04-17 RX ADMIN — FENTANYL CITRATE 25 MCG: 50 INJECTION INTRAMUSCULAR; INTRAVENOUS at 15:12

## 2025-04-17 RX ADMIN — Medication 80 MCG: at 14:38

## 2025-04-17 RX ADMIN — SODIUM CHLORIDE, SODIUM LACTATE, POTASSIUM CHLORIDE, AND CALCIUM CHLORIDE: .6; .31; .03; .02 INJECTION, SOLUTION INTRAVENOUS at 13:26

## 2025-04-17 RX ADMIN — LIDOCAINE HYDROCHLORIDE 5 ML: 10 INJECTION, SOLUTION INFILTRATION; PERINEURAL at 11:05

## 2025-04-17 RX ADMIN — FENTANYL CITRATE 50 MCG: 50 INJECTION INTRAMUSCULAR; INTRAVENOUS at 14:37

## 2025-04-17 RX ADMIN — DEXAMETHASONE SODIUM PHOSPHATE 4 MG: 4 INJECTION, SOLUTION INTRAMUSCULAR; INTRAVENOUS at 14:25

## 2025-04-17 RX ADMIN — TILMANOCEPT 0.5 MILLICURIE: KIT at 11:40

## 2025-04-17 RX ADMIN — PROPOFOL 150 MG: 10 INJECTION, EMULSION INTRAVENOUS at 14:18

## 2025-04-17 RX ADMIN — PROPOFOL 50 MG: 10 INJECTION, EMULSION INTRAVENOUS at 14:20

## 2025-04-17 RX ADMIN — ONDANSETRON HYDROCHLORIDE 4 MG: 2 INJECTION INTRAMUSCULAR; INTRAVENOUS at 15:02

## 2025-04-17 ASSESSMENT — PAIN SCALES - GENERAL
PAINLEVEL_OUTOF10: 0

## 2025-04-17 ASSESSMENT — PAIN - FUNCTIONAL ASSESSMENT: PAIN_FUNCTIONAL_ASSESSMENT: 0-10

## 2025-04-17 NOTE — H&P
HISTORY OF PRESENT ILLNESS  Nereyda Rivas is a 74 y.o. female.     HPI NEW patient consult referred by [facility] for LEFT breast cancer. Patient had annual imaging that led to her diagnosis. Patient denies any changes to the breast, skin or nipple. Patient is accompanied by her  today.      Family History: No genetic testing  Mother, breast cancer, diagnosed age 90, did not die of disease  Sister, breast cancer, unknown age of diagnosis, DOD age 82  Paternal Aunt, breast cancer, unknown age of diagnosis and cause of death  Brother, leukemia, unknown age of diagnosis. DOD in 60s        MRI Result (most recent):  MRI BREAST BILATERAL W WO CONTRAST 03/14/2025     Narrative  EXAMINATION: MRI BREAST BILATERAL W WO CONTRAST, 3/14/2025 10:39 AM  INDICATION: 74-year-old female with newly diagnosed left breast invasive ductal  carcinoma and ductal carcinoma in situ undergoing breast MRI to evaluate for  extent of disease.  COMPARISON: Mammogram February 5, 2025 and left-sided mammogram and ultrasound  February 12, 2025 and mammogram February 25, 2025  .  TECHNIQUE:  Bilateral breast MRI was performed using a dedicated breast coil without  compression with the patient in the prone position. Precontrast T1-weighted  images with fat suppression were obtained followed by bolus injection of  contrast. Postcontrast dynamic and high-resolution images were acquired.  T2-weighted axial imaging with fat suppression was also performed. The images  were analyzed using software for kinetic analysis, enhancement curves, digital  subtraction, and reconstructions.     FINDINGS:     Amount of fibroglandular tissue: Scattered fibroglandular tissue  Background parenchymal enhancement: Minimal     RIGHT BREAST  No suspicious masses or abnormal enhancement.     LEFT BREAST:  In the lower inner quadrant at 7 o'clock 5 cm from the nipple at anterior depth  is an irregular spiculated heterogeneously enhancing mildly T2 hyperintense  the lower inner quadrant.     We had a long discussion of options for treatment. The patient was accompanied by her  during this encounter, and over half the time was spent on counseling and coordination of care. We discussed in depth the pathology results, and the need for treatment. The goals of treatment are to treat the breast, and to reduce risk of local or distant recurrence.     We discussed treatment options with risks, complications, benefits, and limitations of lumpectomy with XRT, and mastectomy with optional reconstruction, both having the same cure rate. Will plan to mobilize breast tissue during lumpectomy to minimize cosmetic defect. Also discussed benefit and technique of SLNBx of 3-4 lymph nodes. Reviewed possibility of limited ALND for 3 or more positive LNs.     Since she is over 70 and her tumor is small, ER+, and has clinically negative lymph nodes, XRT is not recommended. Without XRT, there is a slightly increased risk of recurrence, but that this does not affect overall cure rate. SLNBx is also not recommended because of the same criteria. Since her tumor is sensitive to estrogen and HER2-, we ordered a MammaPrint to assess her risk and benefit from chemotherapy. Discussed potential SLNBx depending on MammaPrint results.      We also discussed the patient’s questions and concerns.      Patient has elected to proceed with lumpectomy. F/U with MammaPrint results.      Clinical Staging:         - 1        - Discussed with patient.  National Comprehensive Cancer Network (NCCN) Guidelines Reviewed with Patient:Yes   All available patient images were reviewed  Outside slide review by pathologist initiated   Genetic testing:    - Candidate: No   - Counseled: No   - Performed: No  Metastatic workup:  - N/A  Plastic/Reconstructive Surgery Referral: Not applicable  Other Referrals:  - None           Vascular access requirements: Not necessary at this time  Social Service Needs:    - None  Patient

## 2025-04-17 NOTE — ANESTHESIA PRE PROCEDURE
Department of Anesthesiology  Preprocedure Note       Name:  Nereyda Rivas   Age:  74 y.o.  :  1950                                          MRN:  [id number]         Date:  2025      Surgeon: Surgeon(s):  Brandon Olguin Jr, MD    Procedure: Procedure(s):  LEFT BREAST LUMPECTOMY (BRACKETED NEEDLE LOC PRIOR, 1000) LEFT BREAST SENTINEL NODE BIOPSY (1300)    Medications prior to admission:   Prior to Admission medications    Medication Sig Start Date End Date Taking? Authorizing Provider   oxyBUTYnin (DITROPAN) 5 MG tablet TAKE 1 TABLET BY MOUTH TWICE DAILY 3/23/25  Yes Tawny Robin MD   Multiple Vitamin (MULTIVITAMIN ADULT PO) Take by mouth   Yes Mehran Russo MD   amLODIPine (NORVASC) 5 MG tablet Take 1 tablet by mouth daily  Patient taking differently: Take 1 tablet by mouth nightly 3/13/25 6/11/25 Yes Tawny Robin MD   ezetimibe (ZETIA) 10 MG tablet TAKE 1 TABLET BY MOUTH ONE TIME DAILY  Patient taking differently: Take 1 tablet by mouth nightly 25  Yes Tawny Robin MD   losartan-hydroCHLOROthiazide (HYZAAR) 100-25 MG per tablet TAKE 1 TABLET BY MOUTH ONE TIME DAILY 25  Yes Tawny Robin MD   metFORMIN (GLUCOPHAGE) 1000 MG tablet TAKE 1 TABLET BY MOUTH TWICE DAILY with meals 25  Yes Tawny Robin MD   Omega-3 Fatty Acids (FISH OIL PO) Take by mouth   Yes Mehran Russo MD   Cranberry-Vitamin C-Probiotic (AZO CRANBERRY PO) Take by mouth   Yes Mehran Russo MD   CALCIUM PO Take by mouth 2 times daily 11  Yes Automatic Reconciliation, Ar   vitamin D 25 MCG (1000 UT) CAPS Take by mouth   Yes Automatic Reconciliation, Ar   aspirin 81 MG chewable tablet Take 1 tablet by mouth every other day    Mehran Russo MD   OZEMPIC, 0.25 OR 0.5 MG/DOSE, 2 MG/3ML SOPN Inject 0.5 mg into the skin once a week ON CURT 10/2/24   Mehran Russo MD   blood glucose monitor strips Check blood sugar daily 10/9/24   Tawny Robin MD   glucose monitoring kit Check

## 2025-04-17 NOTE — ANESTHESIA POSTPROCEDURE EVALUATION
Post-Anesthesia Evaluation and Assessment    Patient: Nereyda Rivas MRN: [id number]  SSN: [id number]    YOB: 1950  Age: 74 y.o.  Sex: female      I have evaluated the patient and they are stable and ready for discharge from the PACU.     Cardiovascular Function/Vital Signs  Visit Vitals  BP (!) 150/77   Pulse (!) 108   Temp 97.8 °F (36.6 °C) (Temporal)   Resp 21   Wt 81.9 kg (180 lb 9.6 oz)   SpO2 92%   BMI 31.00 kg/m²       Patient is status post General anesthesia for Procedure(s) with comments:  LEFT BREAST LUMPECTOMY (BRACKETED NEEDLE LOC PRIOR, 1000) LEFT AXILLARY SENTINEL NODE BIOPSY (1300) - LEFT BREAST AND LEFT AXILLA.    Nausea/Vomiting: None    Postoperative hydration reviewed and adequate.    Pain:  Managed    Neurological Status:   At baseline    Mental Status, Level of Consciousness: Alert and  oriented to person, place, and time    Pulmonary Status:   Adequate oxygenation and airway patent    Complications related to anesthesia: None    Post-anesthesia assessment completed. No concerns    Signed By: Julien Young MD     April 17, 2025

## 2025-04-17 NOTE — DISCHARGE INSTRUCTIONS
Discharge Instructions from Dr. Olguin    I will call you with the pathology results, typically within 1 week from today.  You may shower, but no hot tubs, swimming pools, or baths until your incision is healed.  No heavy lifting with the affected extremity (nothing greater than 5 pounds), and limit its use for the next 4-5 days.  You may use an ice pack for comfort for the next couple of days, but do not place ice directly on the skin.  Rather, use a towel or clothing to serve as a barrier between skin and ice to prevent injury.  If I placed a drain, follow the drain instructions provided, especially as you keep a record of the drain output.  Follow medication instructions carefully.  Watch for signs of infection as listed below.  Redness  Swelling  Drainage from the incision or from your nipple that appears infected  Fever over 101 degrees for consecutive readings, or over 99.5 if you are currently undergoing chemotherapy.  Call our office (number is below) for a follow-up appointment.  If you have any problems, our phone number is [phone].

## 2025-04-17 NOTE — PROGRESS NOTES
Patient tolerated left breast wire localization x2 well with scant bleeding. Wires were taped in place and loosely covered with a gauze. Patient was then transported via wheelchair to the nuclear medicine area.

## 2025-04-18 NOTE — OP NOTE
[address]  [address]                            OPERATIVE REPORT      PATIENT NAME: ELIZ YADAV               : 1950  MED REC NO: [id number]                       ROOM: [facility]  ACCOUNT NO: [id number]                       ADMIT DATE: 2025  PROVIDER: Brandon Olguin Jr, MD    DATE OF SERVICE:  2025    PREOPERATIVE DIAGNOSES:  Carcinoma of the left breast.    POSTOPERATIVE DIAGNOSES:  Carcinoma of the left breast.    PROCEDURES PERFORMED:  Left lumpectomy with bracketed needle localization and left sentinel lymph node biopsy.    SURGEON:  Brandon Olguin Jr, MD    ASSISTANT:  Sidney Esparza SA.    ANESTHESIA:  General.    ESTIMATED BLOOD LOSS:  Minimal.    SPECIMENS REMOVED:  Left breast tissue and left axillary sentinel lymph nodes x2.    INTRAOPERATIVE FINDINGS:  Specimen radiograph feel the presence of both clips within the specimen.     COMPLICATIONS:  None.    IMPLANTS:  None.    INDICATIONS:  The patient is a 74-year-old female with a multifocal carcinoma of the left breast, who requires definitive surgical therapy.    DESCRIPTION OF PROCEDURE:  After bracketed needle localization in Radiology, the patient brought to the operating room, also following lymphoscintigraphy.  After the satisfactory induction of general LMA anesthesia, patient prepped and draped in sterile fashion.  Axillary incision was made, deepened through subcutaneous tissue with Bovie cautery.  Utilizing Neoprobe, 2 sentinel nodes were found in the deep axilla.  They were removed and sent for permanent section.  All dissection planes were hemostatic.  The wound was anesthetized with 0.5% Marcaine and closed with interrupted 3-0 Vicryl and a running subcuticular 4-0 Monocryl on the skin.  Attention was then turned to the breast where a curvilinear incision was made in the lower inner quadrant and deepened through subcutaneous tissues with Bovie  cautery.  Both wires were brought into the wound and tissue around tips of both wires was excised down the chest wall.  Specimen was oriented and specimen radiograph was obtained, which revealed the presence of both clips in the center of the specimen.  All dissection planes were hemostatic and it was anesthetized with 0.5% Marcaine.  Tissue advancement flaps were created over a distance 6 x 3 cm superiorly and inferiorly for a total tissue advancement approximately 36 sq cm.  Parenchymal flaps were reapproximated with interrupted 3-0 Vicryl suture after advancing into the lumpectomy defect.  All dissection planes were hemostatic.  The subcutaneous tissue was reapproximated with interrupted 3-0 Vicryl and skin closed running subcuticular 4-0 Monocryl.  The patient tolerated the procedure well.  No complications.  She was taken to recovery room in stable condition.    [place] Node Biopsy for Breast Cancer - Left  Operation performed with curative intent. Yes   Tracer(s) used to identify sentinel nodes in the upfront surgery (non-neoadjuvant) setting (select all that apply). Radioactive tracer   Tracer(s) used to identify sentinel nodes in the neoadjuvant setting (select all that apply). N/A   All nodes (colored or non-colored) present at the end of a dye-filled lymphatic channel were removed. N/A   All significantly radioactive nodes were removed. Yes   All palpably suspicious nodes were removed. N/A   Biopsy-proven positive nodes marked with clips prior to chemotherapy were identified and removed. N/A                AILYN TRACEY JR, MD      JVP/AQS  D:  04/17/2025 15:29:17  T:  04/17/2025 19:48:45  JOB #:  [id number]    CC:   MD Yumiko Miner NP Alexandra J Tate, MD

## 2025-04-19 DIAGNOSIS — N39.498 OTHER URINARY INCONTINENCE: ICD-10-CM

## 2025-04-20 RX ORDER — OXYBUTYNIN CHLORIDE 5 MG/1
5 TABLET ORAL 2 TIMES DAILY
Qty: 60 TABLET | Refills: 0 | Status: SHIPPED | OUTPATIENT
Start: 2025-04-20

## 2025-04-23 ENCOUNTER — TELEPHONE (OUTPATIENT)
Age: 75
End: 2025-04-23

## 2025-04-30 DIAGNOSIS — I10 ESSENTIAL (PRIMARY) HYPERTENSION: ICD-10-CM

## 2025-04-30 DIAGNOSIS — E11.8 TYPE II DIABETES MELLITUS WITH COMPLICATION (HCC): ICD-10-CM

## 2025-04-30 RX ORDER — LOSARTAN POTASSIUM AND HYDROCHLOROTHIAZIDE 25; 100 MG/1; MG/1
1 TABLET ORAL DAILY
Qty: 90 TABLET | Refills: 0 | Status: SHIPPED | OUTPATIENT
Start: 2025-04-30

## 2025-05-02 NOTE — PROGRESS NOTES
Neurology Progress Note    Patient ID:  Nereyda Rivas  [id number]  74 y.o.  1950      Subjective:   History:  Nereyda Rivas is a female who  has a past medical history of Esophageal reflux, Essential hypertension, benign, Mixed hyperlipidemia, Obesity, unspecified, and LUCY (obstructive sleep apnea). Who since Feb 2023, noted daily headache R occipital area radiating to top of head, worse at night, sharp, 8/10, better when she sits up, lasting for 3-4 hrs (+) migraines in your 20s and went away 50s. (+) nausea (+) vomiting (-) photophobia (-) phonophobia. Stress can be a trigger Tried Topamax 25 mg BID (caused nausea) Fioricet, Sumatriptan. CT head (3/28/23) . No evidence of acute or significant intracranial abnormality.  On 2/15/24, patient had bilateral occipital nerve blocks with benefit. Gabapentin made her drowsy.      On 11/4/24, patient had R occipital nerve block with decrease pain that lasted for 4 months.    Patient currently will wake up with 2/10 R occipital pain and wants to have another nerve block    Also recently found to have R breast CA and scheduled to have mastectomy      ROS:  Per HPI-  Otherwise the remainder of ROS was negative    Social Hx  Social History     Socioeconomic History    Marital status:    Tobacco Use    Smoking status: Never    Smokeless tobacco: Never   Vaping Use    Vaping status: Never Used   Substance and Sexual Activity    Alcohol use: Yes     Comment: RARELY    Drug use: No     Social Drivers of Health     Financial Resource Strain: Patient Declined (10/31/2023)    Overall Financial Resource Strain (CARDIA)     Difficulty of Paying Living Expenses: Patient declined   Food Insecurity: Patient Declined (1/28/2025)    Hunger Vital Sign     Worried About Running Out of Food in the Last Year: Patient declined     Ran Out of Food in the Last Year: Patient declined   Transportation Needs: Patient Declined (1/28/2025)    PRAPARE - Transportation     Lack of

## 2025-05-05 ENCOUNTER — OFFICE VISIT (OUTPATIENT)
Age: 75
End: 2025-05-05

## 2025-05-05 ENCOUNTER — ANESTHESIA EVENT (OUTPATIENT)
Facility: HOSPITAL | Age: 75
End: 2025-05-05
Payer: MEDICARE

## 2025-05-05 VITALS
OXYGEN SATURATION: 98 % | BODY MASS INDEX: 31 KG/M2 | HEART RATE: 110 BPM | TEMPERATURE: 97 F | SYSTOLIC BLOOD PRESSURE: 140 MMHG | HEIGHT: 64 IN | DIASTOLIC BLOOD PRESSURE: 80 MMHG

## 2025-05-05 DIAGNOSIS — R51.9 INTRACTABLE HEADACHE, UNSPECIFIED CHRONICITY PATTERN, UNSPECIFIED HEADACHE TYPE: Primary | ICD-10-CM

## 2025-05-05 RX ORDER — OXYCODONE HYDROCHLORIDE 5 MG/1
5 TABLET ORAL
Refills: 0 | Status: CANCELLED | OUTPATIENT
Start: 2025-05-05

## 2025-05-05 RX ORDER — SODIUM CHLORIDE 9 MG/ML
INJECTION, SOLUTION INTRAVENOUS PRN
Status: CANCELLED | OUTPATIENT
Start: 2025-05-05

## 2025-05-05 RX ORDER — NALOXONE HYDROCHLORIDE 0.4 MG/ML
INJECTION, SOLUTION INTRAMUSCULAR; INTRAVENOUS; SUBCUTANEOUS PRN
Status: CANCELLED | OUTPATIENT
Start: 2025-05-05

## 2025-05-05 RX ORDER — SODIUM CHLORIDE 0.9 % (FLUSH) 0.9 %
5-40 SYRINGE (ML) INJECTION EVERY 12 HOURS SCHEDULED
Status: CANCELLED | OUTPATIENT
Start: 2025-05-05

## 2025-05-05 RX ORDER — HYDROMORPHONE HYDROCHLORIDE 1 MG/ML
0.5 INJECTION, SOLUTION INTRAMUSCULAR; INTRAVENOUS; SUBCUTANEOUS EVERY 5 MIN PRN
Refills: 0 | Status: CANCELLED | OUTPATIENT
Start: 2025-05-05

## 2025-05-05 RX ORDER — FENTANYL CITRATE 50 UG/ML
25 INJECTION, SOLUTION INTRAMUSCULAR; INTRAVENOUS EVERY 5 MIN PRN
Refills: 0 | Status: CANCELLED | OUTPATIENT
Start: 2025-05-05

## 2025-05-05 RX ORDER — ONDANSETRON 2 MG/ML
4 INJECTION INTRAMUSCULAR; INTRAVENOUS
Status: CANCELLED | OUTPATIENT
Start: 2025-05-05

## 2025-05-05 RX ORDER — SODIUM CHLORIDE 0.9 % (FLUSH) 0.9 %
5-40 SYRINGE (ML) INJECTION PRN
Status: CANCELLED | OUTPATIENT
Start: 2025-05-05

## 2025-05-05 RX ORDER — HYDRALAZINE HYDROCHLORIDE 20 MG/ML
10 INJECTION INTRAMUSCULAR; INTRAVENOUS ONCE
Status: CANCELLED | OUTPATIENT
Start: 2025-05-05 | End: 2025-05-05

## 2025-05-05 RX ORDER — PROCHLORPERAZINE EDISYLATE 5 MG/ML
5 INJECTION INTRAMUSCULAR; INTRAVENOUS
Status: CANCELLED | OUTPATIENT
Start: 2025-05-05

## 2025-05-06 ENCOUNTER — ANESTHESIA (OUTPATIENT)
Facility: HOSPITAL | Age: 75
End: 2025-05-06
Payer: MEDICARE

## 2025-05-06 ENCOUNTER — HOSPITAL ENCOUNTER (OUTPATIENT)
Facility: HOSPITAL | Age: 75
Setting detail: OUTPATIENT SURGERY
Discharge: HOME | End: 2025-05-06
Attending: SURGERY | Admitting: SURGERY
Payer: MEDICARE

## 2025-05-06 VITALS
SYSTOLIC BLOOD PRESSURE: 149 MMHG | TEMPERATURE: 98.1 F | OXYGEN SATURATION: 94 % | RESPIRATION RATE: 20 BRPM | BODY MASS INDEX: 30.87 KG/M2 | DIASTOLIC BLOOD PRESSURE: 78 MMHG | HEART RATE: 91 BPM | HEIGHT: 64 IN | WEIGHT: 180.8 LBS

## 2025-05-06 DIAGNOSIS — Z17.0 MALIGNANT NEOPLASM OF LEFT BREAST IN FEMALE, ESTROGEN RECEPTOR POSITIVE, UNSPECIFIED SITE OF BREAST (HCC): ICD-10-CM

## 2025-05-06 DIAGNOSIS — C50.912 MALIGNANT NEOPLASM OF LEFT BREAST IN FEMALE, ESTROGEN RECEPTOR POSITIVE, UNSPECIFIED SITE OF BREAST (HCC): ICD-10-CM

## 2025-05-06 LAB
GLUCOSE BLD STRIP.AUTO-MCNC: 100 MG/DL (ref 65–117)
GLUCOSE BLD STRIP.AUTO-MCNC: 122 MG/DL (ref 65–117)
SERVICE CMNT-IMP: ABNORMAL
SERVICE CMNT-IMP: NORMAL

## 2025-05-06 PROCEDURE — 7100000000 HC PACU RECOVERY - FIRST 15 MIN: Performed by: SURGERY

## 2025-05-06 PROCEDURE — 3700000001 HC ADD 15 MINUTES (ANESTHESIA): Performed by: SURGERY

## 2025-05-06 PROCEDURE — 2500000003 HC RX 250 WO HCPCS: Performed by: NURSE ANESTHETIST, CERTIFIED REGISTERED

## 2025-05-06 PROCEDURE — 82962 GLUCOSE BLOOD TEST: CPT

## 2025-05-06 PROCEDURE — 7100000001 HC PACU RECOVERY - ADDTL 15 MIN: Performed by: SURGERY

## 2025-05-06 PROCEDURE — 2580000003 HC RX 258: Performed by: STUDENT IN AN ORGANIZED HEALTH CARE EDUCATION/TRAINING PROGRAM

## 2025-05-06 PROCEDURE — 3600000003 HC SURGERY LEVEL 3 BASE: Performed by: SURGERY

## 2025-05-06 PROCEDURE — 6360000002 HC RX W HCPCS: Performed by: SURGERY

## 2025-05-06 PROCEDURE — 6360000002 HC RX W HCPCS: Performed by: NURSE ANESTHETIST, CERTIFIED REGISTERED

## 2025-05-06 PROCEDURE — 2709999900 HC NON-CHARGEABLE SUPPLY: Performed by: SURGERY

## 2025-05-06 PROCEDURE — 2500000003 HC RX 250 WO HCPCS: Performed by: SURGERY

## 2025-05-06 PROCEDURE — 3700000000 HC ANESTHESIA ATTENDED CARE: Performed by: SURGERY

## 2025-05-06 PROCEDURE — 3600000013 HC SURGERY LEVEL 3 ADDTL 15MIN: Performed by: SURGERY

## 2025-05-06 PROCEDURE — 19301 PARTIAL MASTECTOMY: CPT | Performed by: SURGERY

## 2025-05-06 PROCEDURE — 88307 TISSUE EXAM BY PATHOLOGIST: CPT

## 2025-05-06 RX ORDER — MIDAZOLAM HYDROCHLORIDE 1 MG/ML
INJECTION, SOLUTION INTRAMUSCULAR; INTRAVENOUS
Status: DISCONTINUED | OUTPATIENT
Start: 2025-05-06 | End: 2025-05-06 | Stop reason: SDUPTHER

## 2025-05-06 RX ORDER — PROPOFOL 10 MG/ML
INJECTION, EMULSION INTRAVENOUS
Status: DISCONTINUED | OUTPATIENT
Start: 2025-05-06 | End: 2025-05-06 | Stop reason: SDUPTHER

## 2025-05-06 RX ORDER — FENTANYL CITRATE 50 UG/ML
INJECTION, SOLUTION INTRAMUSCULAR; INTRAVENOUS
Status: DISCONTINUED | OUTPATIENT
Start: 2025-05-06 | End: 2025-05-06 | Stop reason: SDUPTHER

## 2025-05-06 RX ORDER — SODIUM CHLORIDE 9 MG/ML
INJECTION, SOLUTION INTRAVENOUS PRN
Status: DISCONTINUED | OUTPATIENT
Start: 2025-05-06 | End: 2025-05-06 | Stop reason: HOSPADM

## 2025-05-06 RX ORDER — OXYCODONE AND ACETAMINOPHEN 5; 325 MG/1; MG/1
1 TABLET ORAL EVERY 4 HOURS PRN
Qty: 12 TABLET | Refills: 0 | Status: SHIPPED | OUTPATIENT
Start: 2025-05-06 | End: 2025-05-09

## 2025-05-06 RX ORDER — ACETAMINOPHEN 500 MG
1000 TABLET ORAL ONCE
Status: DISCONTINUED | OUTPATIENT
Start: 2025-05-06 | End: 2025-05-06 | Stop reason: HOSPADM

## 2025-05-06 RX ORDER — EPHEDRINE SULFATE 50 MG/ML
INJECTION INTRAVENOUS
Status: DISCONTINUED | OUTPATIENT
Start: 2025-05-06 | End: 2025-05-06 | Stop reason: SDUPTHER

## 2025-05-06 RX ORDER — LIDOCAINE HYDROCHLORIDE 20 MG/ML
INJECTION, SOLUTION EPIDURAL; INFILTRATION; INTRACAUDAL; PERINEURAL
Status: DISCONTINUED | OUTPATIENT
Start: 2025-05-06 | End: 2025-05-06 | Stop reason: SDUPTHER

## 2025-05-06 RX ORDER — ONDANSETRON 2 MG/ML
INJECTION INTRAMUSCULAR; INTRAVENOUS
Status: DISCONTINUED | OUTPATIENT
Start: 2025-05-06 | End: 2025-05-06 | Stop reason: SDUPTHER

## 2025-05-06 RX ORDER — FENTANYL CITRATE 50 UG/ML
100 INJECTION, SOLUTION INTRAMUSCULAR; INTRAVENOUS
Status: DISCONTINUED | OUTPATIENT
Start: 2025-05-06 | End: 2025-05-06 | Stop reason: HOSPADM

## 2025-05-06 RX ORDER — SODIUM CHLORIDE, SODIUM LACTATE, POTASSIUM CHLORIDE, CALCIUM CHLORIDE 600; 310; 30; 20 MG/100ML; MG/100ML; MG/100ML; MG/100ML
INJECTION, SOLUTION INTRAVENOUS CONTINUOUS
Status: DISCONTINUED | OUTPATIENT
Start: 2025-05-06 | End: 2025-05-06 | Stop reason: HOSPADM

## 2025-05-06 RX ORDER — SODIUM CHLORIDE 0.9 % (FLUSH) 0.9 %
5-40 SYRINGE (ML) INJECTION EVERY 12 HOURS SCHEDULED
Status: DISCONTINUED | OUTPATIENT
Start: 2025-05-06 | End: 2025-05-06 | Stop reason: HOSPADM

## 2025-05-06 RX ORDER — LIDOCAINE HYDROCHLORIDE 10 MG/ML
1 INJECTION, SOLUTION EPIDURAL; INFILTRATION; INTRACAUDAL; PERINEURAL
Status: DISCONTINUED | OUTPATIENT
Start: 2025-05-06 | End: 2025-05-06 | Stop reason: HOSPADM

## 2025-05-06 RX ORDER — PHENYLEPHRINE HCL IN 0.9% NACL 0.4MG/10ML
SYRINGE (ML) INTRAVENOUS
Status: DISCONTINUED | OUTPATIENT
Start: 2025-05-06 | End: 2025-05-06 | Stop reason: SDUPTHER

## 2025-05-06 RX ORDER — MIDAZOLAM HYDROCHLORIDE 2 MG/2ML
2 INJECTION, SOLUTION INTRAMUSCULAR; INTRAVENOUS PRN
Status: DISCONTINUED | OUTPATIENT
Start: 2025-05-06 | End: 2025-05-06 | Stop reason: HOSPADM

## 2025-05-06 RX ORDER — SODIUM CHLORIDE 0.9 % (FLUSH) 0.9 %
5-40 SYRINGE (ML) INJECTION PRN
Status: DISCONTINUED | OUTPATIENT
Start: 2025-05-06 | End: 2025-05-06 | Stop reason: HOSPADM

## 2025-05-06 RX ORDER — BUPIVACAINE HYDROCHLORIDE 5 MG/ML
30 INJECTION, SOLUTION EPIDURAL; INTRACAUDAL ONCE
Status: DISCONTINUED | OUTPATIENT
Start: 2025-05-06 | End: 2025-05-06 | Stop reason: HOSPADM

## 2025-05-06 RX ORDER — LIDOCAINE HYDROCHLORIDE AND EPINEPHRINE 10; 10 MG/ML; UG/ML
30 INJECTION, SOLUTION INFILTRATION; PERINEURAL ONCE
Status: DISCONTINUED | OUTPATIENT
Start: 2025-05-06 | End: 2025-05-06 | Stop reason: HOSPADM

## 2025-05-06 RX ORDER — DEXAMETHASONE SODIUM PHOSPHATE 4 MG/ML
INJECTION, SOLUTION INTRA-ARTICULAR; INTRALESIONAL; INTRAMUSCULAR; INTRAVENOUS; SOFT TISSUE
Status: DISCONTINUED | OUTPATIENT
Start: 2025-05-06 | End: 2025-05-06 | Stop reason: SDUPTHER

## 2025-05-06 RX ADMIN — Medication 80 MCG: at 12:32

## 2025-05-06 RX ADMIN — DEXAMETHASONE SODIUM PHOSPHATE 8 MG: 4 INJECTION INTRA-ARTICULAR; INTRALESIONAL; INTRAMUSCULAR; INTRAVENOUS; SOFT TISSUE at 12:26

## 2025-05-06 RX ADMIN — SODIUM CHLORIDE, POTASSIUM CHLORIDE, SODIUM LACTATE AND CALCIUM CHLORIDE: 600; 310; 30; 20 INJECTION, SOLUTION INTRAVENOUS at 11:58

## 2025-05-06 RX ADMIN — WATER 2000 MG: 1 INJECTION INTRAMUSCULAR; INTRAVENOUS; SUBCUTANEOUS at 12:26

## 2025-05-06 RX ADMIN — Medication 80 MCG: at 12:29

## 2025-05-06 RX ADMIN — MIDAZOLAM 2 MG: 1 INJECTION INTRAMUSCULAR; INTRAVENOUS at 12:06

## 2025-05-06 RX ADMIN — Medication 100 MCG: at 12:35

## 2025-05-06 RX ADMIN — EPHEDRINE SULFATE 10 MG: 50 INJECTION INTRAVENOUS at 12:40

## 2025-05-06 RX ADMIN — PROPOFOL 50 MG: 10 INJECTION, EMULSION INTRAVENOUS at 12:32

## 2025-05-06 RX ADMIN — PROPOFOL 200 MG: 10 INJECTION, EMULSION INTRAVENOUS at 12:18

## 2025-05-06 RX ADMIN — PROPOFOL 100 MG: 10 INJECTION, EMULSION INTRAVENOUS at 12:23

## 2025-05-06 RX ADMIN — ONDANSETRON 4 MG: 2 INJECTION, SOLUTION INTRAMUSCULAR; INTRAVENOUS at 12:26

## 2025-05-06 RX ADMIN — LIDOCAINE HYDROCHLORIDE 60 MG: 20 INJECTION, SOLUTION EPIDURAL; INFILTRATION; INTRACAUDAL; PERINEURAL at 12:18

## 2025-05-06 RX ADMIN — FENTANYL CITRATE 50 MCG: 50 INJECTION INTRAMUSCULAR; INTRAVENOUS at 13:00

## 2025-05-06 ASSESSMENT — PAIN - FUNCTIONAL ASSESSMENT: PAIN_FUNCTIONAL_ASSESSMENT: NONE - DENIES PAIN

## 2025-05-06 NOTE — ANESTHESIA POSTPROCEDURE EVALUATION
Department of Anesthesiology  Postprocedure Note    Patient: Nereyda Rivas  MRN: [id number]  YOB: 1950  Date of evaluation: 5/6/2025    Procedure Summary       Date: 05/06/25 Room / Location: [facility] ASU A2 / [facility] AMBULATORY OR    Anesthesia Start: 1211 Anesthesia Stop: 1302    Procedure: RE-EXCISION INFERIOR MARGIN LEFT BREAST LUMPECTOMY (Left: Breast) Diagnosis:       Malignant neoplasm of left breast (HCC)      (Malignant neoplasm of left breast (HCC) [C50.912])    Surgeons: Brandon Olguin Jr, MD Responsible Provider: Shelbie Treviño MD    Anesthesia Type: general ASA Status: 2            Anesthesia Type: No value filed.    Trina Phase I: Trina Score: 10    Trina Phase II:      Anesthesia Post Evaluation    Level of consciousness: awake  Airway patency: patent  Nausea & Vomiting: no nausea  Cardiovascular status: hemodynamically stable  Respiratory status: acceptable  Hydration status: euvolemic  Pain management: adequate        No notable events documented.

## 2025-05-06 NOTE — DISCHARGE INSTRUCTIONS
Discharge Instructions from Dr. Olguin    I will call you with the pathology results, typically within 1 week from today.  You may shower, but no hot tubs, swimming pools, or baths until your incision is healed.  No heavy lifting with the affected extremity (nothing greater than 5 pounds), and limit its use for the next 4-5 days.  You may use an ice pack for comfort for the next couple of days, but do not place ice directly on the skin.  Rather, use a towel or clothing to serve as a barrier between skin and ice to prevent injury.  If I placed a drain, follow the drain instructions provided, especially as you keep a record of the drain output.  Follow medication instructions carefully.  Watch for signs of infection as listed below.  Redness  Swelling  Drainage from the incision or from your nipple that appears infected  Fever over 101 degrees for consecutive readings, or over 99.5 if you are currently undergoing chemotherapy.  Call our office (number is below) for a follow-up appointment.  If you have any problems, our phone number is [phone].

## 2025-05-06 NOTE — ANESTHESIA PRE PROCEDURE
Department of Anesthesiology  Preprocedure Note       Name:  Nereyda Rivas   Age:  74 y.o.  :  1950                                          MRN:  [id number]         Date:  2025      Surgeon: Surgeon(s):  Brandon Olguin Jr, MD    Procedure: Procedure(s):  RE-EXCISION INFERIOR MARGIN LEFT BREAST LUMPECTOMY    Medications prior to admission:   Prior to Admission medications    Medication Sig Start Date End Date Taking? Authorizing Provider   losartan-hydroCHLOROthiazide (HYZAAR) 100-25 MG per tablet TAKE 1 TABLET BY MOUTH ONE TIME DAILY 25   Tawny Robin MD   metFORMIN (GLUCOPHAGE) 1000 MG tablet TAKE 1 TABLET BY MOUTH TWICE DAILY with meals 25   Tawny Robin MD   oxyBUTYnin (DITROPAN) 5 MG tablet TAKE 1 TABLET BY MOUTH TWICE DAILY 25   Tawny Robin MD   aspirin 81 MG chewable tablet Take 1 tablet by mouth every other day  Patient not taking: Reported on 2025    Mehran Russo MD   Multiple Vitamin (MULTIVITAMIN ADULT PO) Take by mouth  Patient not taking: Reported on 2025    Mehran Russo MD   amLODIPine (NORVASC) 5 MG tablet Take 1 tablet by mouth daily 3/13/25 6/11/25  Tawny Robin MD   ezetimibe (ZETIA) 10 MG tablet TAKE 1 TABLET BY MOUTH ONE TIME DAILY 25   Tawny Robin MD   OZEMPIC, 0.25 OR 0.5 MG/DOSE, 2 MG/3ML SOPN Inject 0.5 mg into the skin once a week ON   Patient not taking: Reported on 2025 10/2/24   Mehran Russo MD   blood glucose monitor strips Check blood sugar daily  Patient not taking: Reported on 2025 10/9/24   Tawny Robin MD   glucose monitoring kit Check blood sugar daily 10/9/24   Tawny Robin MD   Lancet Devices (LANCING DEVICE) MISC Check blood sugar daily 10/9/24   Tawny Robin MD   Lancets MISC Check blood sugar daily 10/9/24   Tawny Robin MD   Omega-3 Fatty Acids (FISH OIL PO) Take by mouth  Patient not taking: Reported on 2025    Provider, Historical, MD   Cranberry-Vitamin C-Probiotic (AZO

## 2025-05-06 NOTE — OP NOTE
[address]  [address]                            OPERATIVE REPORT      PATIENT NAME: ELIZ YADAV               : 1950  MED REC NO: [id number]                       ROOM: [facility]  ACCOUNT NO: [id number]                       ADMIT DATE: 2025  PROVIDER: Ailyn Tracey Jr, MD    DATE OF SERVICE:  2025    PREOPERATIVE DIAGNOSES:  Carcinoma of the left breast, status post lumpectomy with positive inferior margin.    POSTOPERATIVE DIAGNOSES:  Carcinoma of the left breast, status post lumpectomy with positive inferior margin.    PROCEDURES PERFORMED:  Re-excision of inferior margin of left lumpectomy.    SURGEON:  Ailyn Tracey Jr, MD    ASSISTANT:  Sidney Esparza SA.    ANESTHESIA:  General.    ESTIMATED BLOOD LOSS:  Minimal.    SPECIMENS REMOVED:  Inferior margin and inferior medial margin of left lumpectomy.    INTRAOPERATIVE FINDINGS:  None.     COMPLICATIONS:  None.    IMPLANTS:  None.    INDICATIONS:  The patient is a 74-year-old female with above diagnosis.    DESCRIPTION OF PROCEDURE:  After the satisfactory induction of general LMA anesthesia, the patient was prepped and draped in a sterile fashion.  The previous lumpectomy incision was opened.  Seroma cavity drained.  The reconstructive lumpectomy was taken down and the inferior margin was easily identified.  It was widely excised and also additional inferior medial margin was obtained.  All dissection planes were hemostatic.  Specimens oriented and sent to pathology.  The wound was anesthetized with 0.5% Marcaine and closed with 2 inner layers of 3-0 Vicryl and a running subcuticular 4-0 Monocryl on the skin.  The patient tolerated the procedure well with no complications.  She was taken to the recovery room in stable condition.        AILYN TRACEY JR, MD      JVP/AQS  D:  2025 13:22:21  T:  2025 14:04:25  JOB #:  [id number]    CC:   Tawny

## 2025-05-06 NOTE — H&P
mass  measuring 1.0 x 0.8 x 1.0 cm exhibiting initial fast and delayed washout  kinetics. This is biopsy-proven invasive ductal carcinoma. Posterior and medial  to this an irregular heterogeneously enhancing T2 hyperintense mass measuring  0.8 x 0.5 x 0.7 cm exhibiting initial fast and delayed washout kinetics. This  represents biopsy-proven ductal carcinoma in situ. Signal void from biopsy  markers are present along the posterior aspects of the masses. Collectively the  masses measure 2.0 x 1.4 x 0.7 cm. The masses are widely clear of the skin,  nipple areolar complex, and chest wall. No additional masses or suspicious  enhancement in the left breast.     EXTRAMAMMARY:  A mildly prominent left internal mammary lymph node is nonspecific. No  morphologically abnormal axillary or internal mammary lymph nodes. Visualized  soft tissues are within normal limits.     Impression  1. 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  2. A mildly prominent left internal mammary lymph nodes is nonspecific.  3. No findings suspicious for malignancy in the right breast.     BI-RADS Assessment Category 6: Known biopsy proven malignancy- Appropriate  action should be taken.     RECOMMENDATION:  1. Further management by surgical oncology is expected.              A negative breast MRI examination speaks strongly against invasive cancer down  to a detection threshold of 3 to 5 mm but may not detect some lower grade or in  situ carcinomas. Therefore, routine clinical and mammographic follow up are  recommended.        Electronically signed by Colten Velasco           2/25/25: LEFT breast, biopsy: DCIS, grade 2, with focal necrosis and calcifications measuring at least 4mm. ER+(90%)/DC+(3-5%)/HER2-, Ki-67 42%  - LEFT breast, 7:00 6cm FN, biopsy: IDC, grade 2, measuring at least 4mm. DCIS, grade 2.         Past Medical History        Past Medical History:

## 2025-05-09 DIAGNOSIS — E78.2 MIXED HYPERLIPIDEMIA: ICD-10-CM

## 2025-05-09 RX ORDER — EZETIMIBE 10 MG/1
10 TABLET ORAL DAILY
Qty: 90 TABLET | Refills: 0 | Status: SHIPPED | OUTPATIENT
Start: 2025-05-09 | End: 2025-08-07

## 2025-05-12 ENCOUNTER — TELEPHONE (OUTPATIENT)
Age: 75
End: 2025-05-12

## 2025-05-12 DIAGNOSIS — C50.912 MALIGNANT NEOPLASM OF LEFT BREAST IN FEMALE, ESTROGEN RECEPTOR POSITIVE, UNSPECIFIED SITE OF BREAST (HCC): Primary | ICD-10-CM

## 2025-05-12 DIAGNOSIS — Z17.0 MALIGNANT NEOPLASM OF LEFT BREAST IN FEMALE, ESTROGEN RECEPTOR POSITIVE, UNSPECIFIED SITE OF BREAST (HCC): Primary | ICD-10-CM

## 2025-05-13 ENCOUNTER — TELEPHONE (OUTPATIENT)
Age: 75
End: 2025-05-13

## 2025-05-19 DIAGNOSIS — N39.498 OTHER URINARY INCONTINENCE: ICD-10-CM

## 2025-05-19 RX ORDER — OXYBUTYNIN CHLORIDE 5 MG/1
5 TABLET ORAL 2 TIMES DAILY
Qty: 60 TABLET | Refills: 0 | Status: SHIPPED | OUTPATIENT
Start: 2025-05-19

## 2025-05-21 ENCOUNTER — OFFICE VISIT (OUTPATIENT)
Age: 75
End: 2025-05-21

## 2025-05-21 VITALS — WEIGHT: 178 LBS | BODY MASS INDEX: 30.39 KG/M2 | HEIGHT: 64 IN

## 2025-05-21 DIAGNOSIS — Z98.890 S/P LUMPECTOMY, LEFT BREAST: Primary | ICD-10-CM

## 2025-05-21 DIAGNOSIS — C50.912 INVASIVE DUCTAL CARCINOMA OF BREAST, FEMALE, LEFT (HCC): ICD-10-CM

## 2025-05-21 PROCEDURE — 99024 POSTOP FOLLOW-UP VISIT: CPT | Performed by: SURGERY

## 2025-05-21 NOTE — PROGRESS NOTES
HISTORY OF PRESENT ILLNESS  Nereyda Rivas is a 74 y.o. female.    HPI ESTABLISHED patient here for post op visit. S/P LEFT breast lumpectomy and LEFT axillary SNBx and re-excision of the inferior margin for LEFT breast cancer. Patient reports a pulling sensation in the LEFT breast but says she feels a little better everyday. The surgical glue is still intact. The patient is accompanied by her  today.      Patient sees Dr. Ghotra on 5/28/25.     2/25/25: LEFT breast, biopsy: DCIS, grade 2, with focal necrosis and calcifications, measuring at least 4mm. ER+(90%)/OR+(3-5%)/HER2-, Ki-67 42%  - LEFT breast, 7:00 6cm FN, biopsy: IDC, grade 2, measuring at least 4mm. DCIS, grade 2.     4/17/25: LEFT breast tissue, lumpectomy: IDC, 7 mm in size, with associated high-grade DCIS. Invasive carcinoma present on inferior margin. Previous biopsy site changes. 0/3 LN involved. PATH stage: pT1b, pN0(sn)    5/6/25: inferior margin, LEFT breast: Benign breast tissue with chronic inflammation, foreign body giant cell reaction and fat necrosis.   - medial margin, LEFT breast: Benign breast tissue with chronic inflammation, foreign body giant cell reaction and fat necrosis.     Past Medical History:   Diagnosis Date    Cancer (HCC) 02/2025    LEFT BREAST    Diabetes mellitus (HCC)     Esophageal reflux 4/13/2010    Essential hypertension, benign 4/13/2010    Mixed hyperlipidemia 4/13/2010    Obesity, unspecified 4/13/2010    Occipital neuralgia     WAKES DAILY WITH HEADACHE    LUCY (obstructive sleep apnea) 08-    AHI: 5.6 per hour;  USES CPAP       Past Surgical History:   Procedure Laterality Date    BREAST LUMPECTOMY Left 4/17/2025    LEFT BREAST LUMPECTOMY (BRACKETED NEEDLE LOC PRIOR, 1000) LEFT AXILLARY SENTINEL NODE BIOPSY (1300) performed by Brandon Olguin Jr, MD at [facility] AMBULATORY OR    BREAST LUMPECTOMY Left 5/6/2025    RE-EXCISION INFERIOR MARGIN LEFT BREAST LUMPECTOMY performed by Brandon Olguin Jr, MD

## 2025-05-21 NOTE — PROGRESS NOTES
HISTORY OF PRESENT ILLNESS  Nereyda Rivas is a 74 y.o. female     HPI ESTABLISHED patient here for post op visit. S/P LEFT breast lumpectomy and LEFT axillary SNBx and re-excision of the inferior margin for LEFT breast cancer. Patient reports a pulling sensation in the LEFT breast but says she feels a little better everyday. The surgical glue is still intact. The patient is accompanied by her  today.     Patient sees Dr. Ghotra on 5/28/25.     2/25/25: LEFT breast, biopsy: DCIS, grade 2, with focal necrosis and calcifications, measuring at least 4mm. ER+(90%)/[place]+(3-5%)/HER2-, Ki-67 42%  - LEFT breast, 7:00 6cm FN, biopsy: IDC, grade 2, measuring at least 4mm. DCIS, grade 2.      4/17/25: LEFT breast tissue, lumpectomy: IDC, 7 mm in size, with associated high-grade DCIS. Invasive carcinoma present on inferior margin. Previous biopsy site changes. 0/3 LN involved. PATH stage: pT1b, pN0(sn)     5/6/25: inferior margin, LEFT breast: Benign breast tissue with chronic inflammation, foreign body giant cell reaction and fat necrosis.   - medial margin, LEFT breast: Benign breast tissue with chronic inflammation, foreign body giant cell reaction and fat necrosis.     Review of Systems      Physical Exam       ASSESSMENT and PLAN  {Assessment and Plan Chronic Disease:[id number]}

## 2025-05-23 ENCOUNTER — TELEPHONE (OUTPATIENT)
Dept: PRIMARY CARE CLINIC | Facility: CLINIC | Age: 75
End: 2025-05-23

## 2025-05-23 NOTE — TELEPHONE ENCOUNTER
Ozempic rx delivered from Patient assist program  Patient notified and will  on Tuesday due to Monday being a holiday

## 2025-05-28 ENCOUNTER — INITIAL CONSULT (OUTPATIENT)
Age: 75
End: 2025-05-28
Payer: MEDICARE

## 2025-05-28 ENCOUNTER — CLINICAL DOCUMENTATION (OUTPATIENT)
Age: 75
End: 2025-05-28

## 2025-05-28 VITALS
RESPIRATION RATE: 18 BRPM | DIASTOLIC BLOOD PRESSURE: 89 MMHG | BODY MASS INDEX: 30.9 KG/M2 | SYSTOLIC BLOOD PRESSURE: 137 MMHG | HEART RATE: 105 BPM | WEIGHT: 180 LBS | OXYGEN SATURATION: 97 % | TEMPERATURE: 98.1 F

## 2025-05-28 DIAGNOSIS — C50.412 MALIGNANT NEOPLASM OF UPPER-OUTER QUADRANT OF LEFT BREAST IN FEMALE, ESTROGEN RECEPTOR POSITIVE (HCC): Primary | ICD-10-CM

## 2025-05-28 DIAGNOSIS — Z17.0 MALIGNANT NEOPLASM OF UPPER-OUTER QUADRANT OF LEFT BREAST IN FEMALE, ESTROGEN RECEPTOR POSITIVE (HCC): Primary | ICD-10-CM

## 2025-05-28 PROCEDURE — G8427 DOCREV CUR MEDS BY ELIG CLIN: HCPCS | Performed by: INTERNAL MEDICINE

## 2025-05-28 PROCEDURE — 1090F PRES/ABSN URINE INCON ASSESS: CPT | Performed by: INTERNAL MEDICINE

## 2025-05-28 PROCEDURE — 3075F SYST BP GE 130 - 139MM HG: CPT | Performed by: INTERNAL MEDICINE

## 2025-05-28 PROCEDURE — 99204 OFFICE O/P NEW MOD 45 MIN: CPT | Performed by: INTERNAL MEDICINE

## 2025-05-28 PROCEDURE — G9899 SCRN MAM PERF RSLTS DOC: HCPCS | Performed by: INTERNAL MEDICINE

## 2025-05-28 PROCEDURE — 3079F DIAST BP 80-89 MM HG: CPT | Performed by: INTERNAL MEDICINE

## 2025-05-28 PROCEDURE — 99214 OFFICE O/P EST MOD 30 MIN: CPT | Performed by: INTERNAL MEDICINE

## 2025-05-28 PROCEDURE — G8417 CALC BMI ABV UP PARAM F/U: HCPCS | Performed by: INTERNAL MEDICINE

## 2025-05-28 RX ORDER — ANASTROZOLE 1 MG/1
1 TABLET ORAL DAILY
Qty: 90 TABLET | Refills: 3 | Status: SHIPPED | OUTPATIENT
Start: 2025-05-28

## 2025-05-28 NOTE — PROGRESS NOTES
Nereyda Rivas is a 74 y.o. female    Chief Complaint   Patient presents with    New Patient     Breast Cancer       1. Have you been to the ER, urgent care clinic since your last visit?  Hospitalized since your last visit?No    2. Have you seen or consulted any other health care providers outside of the [facility] since your last visit?  Include any pap smears or colon screening. Yes, [facility] location, Dr. London/[facility] [place], Dental.

## 2025-05-28 NOTE — PROGRESS NOTES
[facility]  [address] [place] [address], [address] [place] [id number]  W: [phone]  F: [phone]    Reason for Visit:   Nereyda Rivas is a 74 y.o. female who is seen in consultation at the request of Dr. Olguin for evaluation of breast cancer.    Treatment History:   2/25/25: LEFT breast, biopsy: DCIS, grade 2, with focal necrosis and calcifications, measuring at least 4mm. ER+(90%)/OR+(3-5%)/HER2-, Ki-67 42%  - LEFT breast, 7:00 6cm FN, biopsy: IDC, grade 2, measuring at least 4mm. DCIS, grade 2.      4/17/25: LEFT breast tissue, lumpectomy: IDC, 7 mm in size, with associated high-grade DCIS. Invasive carcinoma present on inferior margin. Previous biopsy site changes. 0/3 LN involved. PATH stage: pT1b, pN0(sn)     5/6/25: inferior margin, LEFT breast: Benign breast tissue with chronic inflammation, foreign body giant cell reaction and fat necrosis.   - medial margin, LEFT breast: Benign breast tissue with chronic inflammation, foreign body giant cell reaction and fat necrosis.     STAGE: pT Category:  pT1b   pN Category:  pN0   N Suffix:  (sn)   Breast Biomarker Testing Performed on Previous Biopsy:         Estrogen Receptor (ER) Status:  Positive (greater than 10% of cells   demonstrate nuclear positivity)   Progesterone Receptor (PgR) Status:  Positive   HER2 (by immunohistochemistry):  Negative (Score 1+)   Ki-67 Percentage of Positive Nuclei:  42%   Testing Performed on Case Number:  outside case     Biopay path ER 90%  OR 3-5%  Her2 1+  Ki 67 42%    MP LOW RISK    History of Present Illness:     Pt seen today for office consult for new LEFT breast cancer post lumpectomy 4/25 with re excision 5/25.   Seen today for discussion of adjuvant systemic therapy.   Initially pt had an abnormal mammo 2/25 at outside facility.   Biopsy done and IDC.   Breast MRI done and collectively mass about 2cm.   MP LOW RISK.   Then had lumpectomy with re excision.   Only 7 mm of IDC with

## 2025-05-29 NOTE — PROGRESS NOTES
NCCN Distress Thermometer    Medical Oncology at [facility]    Date Screening Completed: 5/28/2025    Screening Declined:  [] Yes    Number that best describes how much distress you've experienced in the past week, including today?  0 [] - No distress 1 []      2 [x]      3 []      4 []       5 []       6 []      7 []      8 []      9 []       10 [] - Extreme distress    PROBLEM LIST  Have you had concerns about any of the items below in the past week, including today?      Physical Concerns Practical Concerns   [] Pain [] Taking care of myself    [] Sleep [] Taking care of others    [] Fatigue [] Safety   [] Tobacco use  [] Work   [] Substance use  [] School   [] Memory or concentration [] Housing/Utilities   [] Sexual health [] Finances   [] Changes in eating  [] Insurance   [] Loss or change of physical abilities  [] Transportation    []    Emotional Concerns [] Having enough food   [x] Worry or anxiety [] Access to medicine   [] Sadness or depression [] Treatment decisions   [] Loss of interest or enjoyment     [] Grief or loss  Spiritual or Buddhism Concerns   [] Fear [] Sense of meaning or purpose   [] Loneliness  [] Changes in carmen or beliefs   [] Anger [] Death, dying, or afterlife   [] Changes in appearance [] Conflict between beliefs and cancer treatments    [] Feelings of worthlessness or being a burden [] Relationship with the sacred    [] Ritual or dietary needs    Social Concerns     [] Relationship with spouse or partner     [] Relationship with children    [] Relationship with family members     [] Relationship with friends or coworkers     [] Communication with health care team     [] Ability to have children     [] Prejudice or discrimination        Other Concerns: n/a    Social Work Chart Review Note:   Reviewed chart to gather background information and determine follow-up.  Did not see patient at consult.  NCCN Distress score <5 does not indicate SW outreach.   No plan for chemo; Rx

## 2025-06-04 ENCOUNTER — PHARMACY VISIT (OUTPATIENT)
Dept: PRIMARY CARE CLINIC | Facility: CLINIC | Age: 75
End: 2025-06-04

## 2025-06-04 DIAGNOSIS — E11.8 TYPE II DIABETES MELLITUS WITH COMPLICATION (HCC): Primary | ICD-10-CM

## 2025-06-04 NOTE — PROGRESS NOTES
mg/dL).              A/P:    1) T2DM: Per ADA guidelines, Pt's A1c is at goal of < 7%.  Current SMBG(s)/CGM trend is tightly controlled.  Pt is tolerating GLP-1 agonist and Metformin well.  She is on max dose of Metformin and may consider reducing dose as her most recent A1c indicated a non-diabetic result.  If she wanted to pursue additional weight loss, she could increase Ozempic to 1 mg.  Pt declined change at this time.  She is seeing her PCP in a month and would like to discuss it in person with her.  - Continue Metformin 1000 mg BID  - Continue Ozempic 0.5 mg weekly      Medication reconciliation was completed during the visit.    There are no discontinued medications.  No orders of the defined types were placed in this encounter.      Patient verbalized understanding of the information presented and all of the patient’s questions were answered.  AVS was provided to the patient. Patient advised to call the office with any additional questions or concerns.    Notifications of recommendations will be sent to Tawny Jara MD for review.    Patient will return to clinic for follow up:  Future Appointments   Date Time Provider Department Center   7/29/2025  8:00 AM Tawny Robin MD [facility]   8/28/2025 10:00 AM Tiffanie Dale APRN - NP [facility]   11/6/2025 10:00 AM Naveed Childress MD [facility]   11/25/2025  8:30 AM Nelda Rodrigues APRN - NP [facility]        Shahnaz Jeffries, PharmD, BCGP, BCACP  Clinical Pharmacist Specialist          For Pharmacy Admin Tracking Only    Program: Medical Group  CPA in place:  No  Recommendation Provided To: Patient/Caregiver: 0 via In person  Intervention Accepted By: Patient/Caregiver: 0  Time Spent (min): 30

## 2025-06-04 NOTE — PATIENT INSTRUCTIONS
Your A1c was 5.3% which was controlled.    Speak to Dr. Robin about your Ozempic dose at your appt next month.  You could consider increasing Ozempic to 1 mg and decreasing the Metformin.      Blood Sugar Goal  Fastin-130 mg/dL  1-2 after meals: Less than 180 mg/dL    Carbohydrate Goals  Meal: 30-45 grams   Snacks: 15 grams    Pharmacist Contact Information:  Shahnaz Jeffries PharmD, BCGP, BCACP  Work Phone: [phone] (option #2)

## 2025-06-07 DIAGNOSIS — I10 ESSENTIAL HYPERTENSION, BENIGN: ICD-10-CM

## 2025-06-07 RX ORDER — AMLODIPINE BESYLATE 5 MG/1
5 TABLET ORAL DAILY
Qty: 90 TABLET | Refills: 0 | Status: SHIPPED | OUTPATIENT
Start: 2025-06-07

## 2025-06-14 DIAGNOSIS — N39.498 OTHER URINARY INCONTINENCE: ICD-10-CM

## 2025-06-14 RX ORDER — OXYBUTYNIN CHLORIDE 5 MG/1
5 TABLET ORAL 2 TIMES DAILY
Qty: 60 TABLET | Refills: 0 | Status: SHIPPED | OUTPATIENT
Start: 2025-06-14

## 2025-07-08 ENCOUNTER — TELEPHONE (OUTPATIENT)
Dept: PHARMACY | Facility: CLINIC | Age: 75
End: 2025-07-08

## 2025-07-08 NOTE — TELEPHONE ENCOUNTER
[facility]: STATIN THERAPY REVIEW  Identified Statin Use In Diabetes care gap per [employer] Records dated:07/08/25     Statin Use in Persons with Diabetes Definition:  Eligible:Members with diabetes ages 40-75 during the measurement year    Definition: Medicare members with diabetes ages 40-75 who receive at least 1 fill of a statin medication in the measurement year   Members with diabetes are defined as those who have at least 2 fills of diabetes medications during the measurement year     Compliance: To comply with this measure, a member with diabetes must have a fill for at least 1 statin or statin combination medication in any strength or dose using their Part D benefit during the measurement year.     Last Visit: 1/28/2025  Next Visit: 7/29/2025    STATIN GAP IDENTIFIED-SUPD  Eligibility:  [x]Age 40-74yo   [x]ASCVD >7.5%   []ASCVD  [x]LDL>70mg/dL 74 y.o.  The 10-year ASCVD risk score (Yunier JUAREZ, et al., 2019) is: 37.1%   Lab Results   Component Value Date    LDL 85.4 10/03/2024        Diabetes Medication dispensed: Metformin and Ozempic    Status in portal:  Y-SUPD Care Gap    Per chart review:   Patient has allergy listed to Simvastatin as FACIAL SWELLING.  There is no current diagnosis code that may exclude this patient from the SUPD Care Gap.    Allergies   Allergen Reactions    Simvastatin Swelling     FACIAL SWELLING    Nitrofurantoin Itching    Atenolol-Chlorthalidone Other (See Comments)     fatigue    Codeine Nausea Only     GI         LIPID EVALUATION AND GUIDELINE ASSESSMENT  Lab Results   Component Value Date/Time    CHOL 184 10/03/2024 07:12 AM    TRIG 203 10/03/2024 07:12 AM    HDL 58 10/03/2024 07:12 AM    LDL 85.4 10/03/2024 07:12 AM    LDL 92.2 02/22/2023 08:19 AM    VLDL 40.6 10/03/2024 07:12 AM    ALT 19 01/28/2025 09:58 AM    AST 13 01/28/2025 09:58 AM     The 10-year ASCVD risk score (Yunier JUAREZ, et al., 2019) is: 37.1%    Values used to calculate the score:      Age:

## 2025-07-12 DIAGNOSIS — N39.498 OTHER URINARY INCONTINENCE: ICD-10-CM

## 2025-07-12 RX ORDER — OXYBUTYNIN CHLORIDE 5 MG/1
5 TABLET ORAL 2 TIMES DAILY
Qty: 60 TABLET | Refills: 0 | Status: SHIPPED | OUTPATIENT
Start: 2025-07-12

## 2025-07-23 SDOH — HEALTH STABILITY: PHYSICAL HEALTH: ON AVERAGE, HOW MANY MINUTES DO YOU ENGAGE IN EXERCISE AT THIS LEVEL?: 30 MIN

## 2025-07-23 SDOH — HEALTH STABILITY: PHYSICAL HEALTH: ON AVERAGE, HOW MANY DAYS PER WEEK DO YOU ENGAGE IN MODERATE TO STRENUOUS EXERCISE (LIKE A BRISK WALK)?: 5 DAYS

## 2025-07-23 ASSESSMENT — PATIENT HEALTH QUESTIONNAIRE - PHQ9
SUM OF ALL RESPONSES TO PHQ QUESTIONS 1-9: 0
2. FEELING DOWN, DEPRESSED OR HOPELESS: NOT AT ALL
SUM OF ALL RESPONSES TO PHQ QUESTIONS 1-9: 0
1. LITTLE INTEREST OR PLEASURE IN DOING THINGS: NOT AT ALL

## 2025-07-23 ASSESSMENT — LIFESTYLE VARIABLES
HOW OFTEN DO YOU HAVE A DRINK CONTAINING ALCOHOL: 2
HOW OFTEN DO YOU HAVE SIX OR MORE DRINKS ON ONE OCCASION: 1
HOW OFTEN DO YOU HAVE A DRINK CONTAINING ALCOHOL: MONTHLY OR LESS
HOW MANY STANDARD DRINKS CONTAINING ALCOHOL DO YOU HAVE ON A TYPICAL DAY: 1 OR 2
HOW MANY STANDARD DRINKS CONTAINING ALCOHOL DO YOU HAVE ON A TYPICAL DAY: 1

## 2025-07-29 ENCOUNTER — PATIENT MESSAGE (OUTPATIENT)
Dept: PRIMARY CARE CLINIC | Facility: CLINIC | Age: 75
End: 2025-07-29

## 2025-07-29 ENCOUNTER — OFFICE VISIT (OUTPATIENT)
Dept: PRIMARY CARE CLINIC | Facility: CLINIC | Age: 75
End: 2025-07-29
Payer: MEDICARE

## 2025-07-29 VITALS
SYSTOLIC BLOOD PRESSURE: 150 MMHG | WEIGHT: 179 LBS | RESPIRATION RATE: 18 BRPM | DIASTOLIC BLOOD PRESSURE: 88 MMHG | BODY MASS INDEX: 30.56 KG/M2 | OXYGEN SATURATION: 99 % | TEMPERATURE: 97.3 F | HEIGHT: 64 IN | HEART RATE: 81 BPM

## 2025-07-29 DIAGNOSIS — R51.9 INCREASED SEVERITY OF HEADACHES: ICD-10-CM

## 2025-07-29 DIAGNOSIS — E11.8 TYPE II DIABETES MELLITUS WITH COMPLICATION (HCC): ICD-10-CM

## 2025-07-29 DIAGNOSIS — D05.12 DUCTAL CARCINOMA IN SITU (DCIS) OF LEFT BREAST: ICD-10-CM

## 2025-07-29 DIAGNOSIS — I10 UNCONTROLLED HYPERTENSION: ICD-10-CM

## 2025-07-29 DIAGNOSIS — K59.03 DRUG-INDUCED CONSTIPATION: ICD-10-CM

## 2025-07-29 DIAGNOSIS — Z00.00 MEDICARE ANNUAL WELLNESS VISIT, SUBSEQUENT: Primary | ICD-10-CM

## 2025-07-29 DIAGNOSIS — G47.33 OSA ON CPAP: ICD-10-CM

## 2025-07-29 PROCEDURE — G8400 PT W/DXA NO RESULTS DOC: HCPCS | Performed by: INTERNAL MEDICINE

## 2025-07-29 PROCEDURE — G0439 PPPS, SUBSEQ VISIT: HCPCS | Performed by: INTERNAL MEDICINE

## 2025-07-29 PROCEDURE — 2022F DILAT RTA XM EVC RTNOPTHY: CPT | Performed by: INTERNAL MEDICINE

## 2025-07-29 PROCEDURE — 1090F PRES/ABSN URINE INCON ASSESS: CPT | Performed by: INTERNAL MEDICINE

## 2025-07-29 PROCEDURE — 1036F TOBACCO NON-USER: CPT | Performed by: INTERNAL MEDICINE

## 2025-07-29 PROCEDURE — 1159F MED LIST DOCD IN RCRD: CPT | Performed by: INTERNAL MEDICINE

## 2025-07-29 PROCEDURE — 1160F RVW MEDS BY RX/DR IN RCRD: CPT | Performed by: INTERNAL MEDICINE

## 2025-07-29 PROCEDURE — G9899 SCRN MAM PERF RSLTS DOC: HCPCS | Performed by: INTERNAL MEDICINE

## 2025-07-29 PROCEDURE — 1123F ACP DISCUSS/DSCN MKR DOCD: CPT | Performed by: INTERNAL MEDICINE

## 2025-07-29 PROCEDURE — G8427 DOCREV CUR MEDS BY ELIG CLIN: HCPCS | Performed by: INTERNAL MEDICINE

## 2025-07-29 PROCEDURE — 3017F COLORECTAL CA SCREEN DOC REV: CPT | Performed by: INTERNAL MEDICINE

## 2025-07-29 PROCEDURE — 1125F AMNT PAIN NOTED PAIN PRSNT: CPT | Performed by: INTERNAL MEDICINE

## 2025-07-29 PROCEDURE — G8417 CALC BMI ABV UP PARAM F/U: HCPCS | Performed by: INTERNAL MEDICINE

## 2025-07-29 PROCEDURE — 99214 OFFICE O/P EST MOD 30 MIN: CPT | Performed by: INTERNAL MEDICINE

## 2025-07-29 PROCEDURE — 3079F DIAST BP 80-89 MM HG: CPT | Performed by: INTERNAL MEDICINE

## 2025-07-29 PROCEDURE — 3077F SYST BP >= 140 MM HG: CPT | Performed by: INTERNAL MEDICINE

## 2025-07-29 PROCEDURE — 3044F HG A1C LEVEL LT 7.0%: CPT | Performed by: INTERNAL MEDICINE

## 2025-07-29 RX ORDER — DOXAZOSIN 2 MG/1
2 TABLET ORAL DAILY
Qty: 30 TABLET | Refills: 1 | Status: SHIPPED | OUTPATIENT
Start: 2025-07-29

## 2025-07-29 RX ORDER — LOSARTAN POTASSIUM AND HYDROCHLOROTHIAZIDE 25; 100 MG/1; MG/1
1 TABLET ORAL DAILY
Qty: 90 TABLET | Refills: 0 | Status: SHIPPED | OUTPATIENT
Start: 2025-07-29

## 2025-07-29 ASSESSMENT — ENCOUNTER SYMPTOMS
SHORTNESS OF BREATH: 0
BACK PAIN: 0
CONSTIPATION: 1
EYE DISCHARGE: 0
ABDOMINAL PAIN: 0
RHINORRHEA: 0
COUGH: 0
SORE THROAT: 0
DIARRHEA: 0
COLOR CHANGE: 0
CHEST TIGHTNESS: 0

## 2025-07-29 NOTE — PROGRESS NOTES
Medicare Annual Wellness Visit    Nereyda Rivas is here for Medicare AWV    Assessment & Plan   Medicare annual wellness visit, subsequent  .Age appropriate Health screening and immunization discussed with patient.         Subjective       Patient's complete Health Risk Assessment and screening values have been reviewed and are found in Flowsheets. The following problems were reviewed today and where indicated follow up appointments were made and/or referrals ordered.    Positive Risk Factor Screenings with Interventions:    Fall Risk:  Do you feel unsteady or are you worried about falling? : (!) yes  2 or more falls in past year?: no  Fall with injury in past year?: no  Interventions:    Reviewed medications, home hazards, visual acuity, and co-morbidities that can increase risk for falls  Patient declines any further evaluation or treatment               Abnormal BMI (obese):  Body mass index is 30.73 kg/m². (!) Abnormal    Interventions:  Patient comments: losing weight on Ozempic and with daily walk                             Objective   Vitals:    07/29/25 0756 07/29/25 0823   BP: (!) 199/93 (!) 150/88   BP Site: Left Upper Arm Left Upper Arm   Patient Position: Sitting Sitting   BP Cuff Size: Medium Adult    Pulse: 81    Resp: 18    Temp: 97.3 °F (36.3 °C)    TempSrc: Temporal    SpO2: 99%    Weight: 81.2 kg (179 lb)    Height: 1.626 m (5' 4\")       Body mass index is 30.73 kg/m².                    Allergies   Allergen Reactions    Simvastatin Swelling     FACIAL SWELLING    Nitrofurantoin Itching    Atenolol-Chlorthalidone Other (See Comments)     fatigue    Codeine Nausea Only     GI     Prior to Visit Medications    Medication Sig Taking? Authorizing Provider   doxazosin (CARDURA) 2 MG tablet Take 1 tablet by mouth daily Yes Tawny Robin MD   oxyBUTYnin (DITROPAN) 5 MG tablet TAKE 1 TABLET BY MOUTH TWICE DAILY Yes Tawny Robin MD   anastrozole (ARIMIDEX) 1 MG tablet Take 1 tablet by mouth daily Yes

## 2025-07-29 NOTE — PROGRESS NOTES
Have you been to the ER, urgent care clinic since your last visit?  Hospitalized since your last visit?   NO      Have you seen or consulted any other health care providers outside our system since your last visit?   NO      Chief Complaint   Patient presents with    Medicare AWV       Pt is ok with scribe.

## 2025-07-29 NOTE — PROGRESS NOTES
Nereyda Rivas (:  1950) is a 74 y.o. female, Established patient, here for evaluation of the following chief complaint(s):  Medicare AWV      ASSESSMENT/PLAN:  1. Increased severity of headaches  -     MRI BRAIN W WO CONTRAST; Future  I ordered a head MRI to further evaluate patient's symptoms. Waiting for results. Patient is followed by Dr. Childress in neurology.     2. Uncontrolled hypertension  -     Comprehensive Metabolic Panel; Future  -     MRI BRAIN W WO CONTRAST; Future  -     doxazosin (CARDURA) 2 MG tablet; Take 1 tablet by mouth daily, Disp-30 tablet, R-1Normal. sent to pharmacy.  -     losartan-hydroCHLOROthiazide (HYZAAR) 100-25 MG per tablet; Take 1 tablet by mouth daily, Disp-90 tablet, R-0Normal. sent to pharmacy.  I ordered a CMP. I ordered a head MRI to further evaluate patient's symptoms. Waiting for results. I am discontinuing amlodipine 5 mg. I am prescribing doxazosin 2 mg to be taken once daily. I advised patient monitor her blood pressure at home. If systolic pressure is over 140, she may take 2 tablets of Doxazosin. She should continue taking losartan-HCTZ 100-25 mg once daily.    3. Type II diabetes mellitus with complication (HCC)  -     CBC; Future  -     Lipid Panel; Future  -     Hemoglobin A1C; Future  -     metFORMIN (GLUCOPHAGE) 500 MG tablet; Take 1 tablet by mouth 2 times daily (with meals), Disp-90 tablet, R-2Normal. sent to pharmacy.  I ordered a CBC. I ordered a lipid panel to assess cholesterol levels. I ordered blood work to measure Hgb A1C levels. Waiting for results. I am decreasing metformin dosage to 500 mg BID daily as hemoglobin A1C has been WNL.     4. LUCY on CPAP  Patient is compliant with CPAP usage.    5. Ductal carcinoma in situ (DCIS) of left breast  Patient is S/P lumpectomy of the left breast. She is followed by Dr. Olguin in Breast Surgery.     6. Drug-induced constipation  I am discontinuing amlodipine 5 mg due to patient's side effects of

## 2025-07-30 DIAGNOSIS — E11.8 TYPE II DIABETES MELLITUS WITH COMPLICATION (HCC): ICD-10-CM

## 2025-07-30 DIAGNOSIS — I10 UNCONTROLLED HYPERTENSION: ICD-10-CM

## 2025-07-30 LAB
ALBUMIN SERPL-MCNC: 4.4 G/DL (ref 3.5–5.2)
ALBUMIN/GLOB SERPL: 1.9 (ref 1.1–2.2)
ALP SERPL-CCNC: 61 U/L (ref 35–104)
ALT SERPL-CCNC: 13 U/L (ref 10–50)
ANION GAP SERPL CALC-SCNC: 13 MMOL/L (ref 2–14)
AST SERPL-CCNC: 14 U/L (ref 10–35)
BILIRUB SERPL-MCNC: 0.7 MG/DL (ref 0–1.2)
BUN SERPL-MCNC: 13 MG/DL (ref 8–23)
BUN/CREAT SERPL: 17 (ref 12–20)
CALCIUM SERPL-MCNC: 10.5 MG/DL (ref 8.8–10.2)
CHLORIDE SERPL-SCNC: 98 MMOL/L (ref 98–107)
CHOLEST SERPL-MCNC: 172 MG/DL (ref 0–200)
CO2 SERPL-SCNC: 27 MMOL/L (ref 20–29)
CREAT SERPL-MCNC: 0.77 MG/DL (ref 0.6–1)
ERYTHROCYTE [DISTWIDTH] IN BLOOD BY AUTOMATED COUNT: 12.3 % (ref 11.5–14.5)
EST. AVERAGE GLUCOSE BLD GHB EST-MCNC: 112 MG/DL
GLOBULIN SER CALC-MCNC: 2.3 G/DL (ref 2–4)
GLUCOSE SERPL-MCNC: 104 MG/DL (ref 65–100)
HBA1C MFR BLD: 5.5 % (ref 4–5.6)
HCT VFR BLD AUTO: 39.4 % (ref 35–47)
HDLC SERPL-MCNC: 63 MG/DL (ref 40–60)
HDLC SERPL: 2.7
HGB BLD-MCNC: 13 G/DL (ref 11.5–16)
LDLC SERPL CALC-MCNC: 91 MG/DL
MCH RBC QN AUTO: 31 PG (ref 26–34)
MCHC RBC AUTO-ENTMCNC: 33 G/DL (ref 30–36.5)
MCV RBC AUTO: 94 FL (ref 80–99)
NRBC # BLD: 0 K/UL (ref 0–0.01)
NRBC BLD-RTO: 0 PER 100 WBC
PLATELET # BLD AUTO: 441 K/UL (ref 150–400)
PMV BLD AUTO: 10.3 FL (ref 8.9–12.9)
POTASSIUM SERPL-SCNC: 4.2 MMOL/L (ref 3.5–5.1)
PROT SERPL-MCNC: 6.7 G/DL (ref 6.4–8.3)
RBC # BLD AUTO: 4.19 M/UL (ref 3.8–5.2)
SODIUM SERPL-SCNC: 138 MMOL/L (ref 136–145)
TRIGL SERPL-MCNC: 92 MG/DL (ref 0–150)
VLDLC SERPL CALC-MCNC: 18 MG/DL
WBC # BLD AUTO: 6.3 K/UL (ref 3.6–11)

## 2025-07-31 NOTE — TELEPHONE ENCOUNTER
For Pharmacy Admin Tracking Only    Program: Medical Group  CPA in place:  No  Recommendation Provided To: Patient/Caregiver: 1 via [employer] Message  Intervention Detail: Device Training  Intervention Accepted By: Patient/Caregiver: 1  Time Spent (min): 5

## 2025-08-03 DIAGNOSIS — E78.2 MIXED HYPERLIPIDEMIA: ICD-10-CM

## 2025-08-03 RX ORDER — EZETIMIBE 10 MG/1
10 TABLET ORAL DAILY
Qty: 90 TABLET | Refills: 0 | Status: SHIPPED | OUTPATIENT
Start: 2025-08-03 | End: 2025-11-01

## 2025-08-08 ENCOUNTER — HOSPITAL ENCOUNTER (OUTPATIENT)
Facility: HOSPITAL | Age: 75
Discharge: HOME | End: 2025-08-11
Attending: INTERNAL MEDICINE
Payer: MEDICARE

## 2025-08-08 DIAGNOSIS — I10 UNCONTROLLED HYPERTENSION: ICD-10-CM

## 2025-08-08 DIAGNOSIS — R51.9 INCREASED SEVERITY OF HEADACHES: ICD-10-CM

## 2025-08-08 DIAGNOSIS — N39.498 OTHER URINARY INCONTINENCE: ICD-10-CM

## 2025-08-08 PROCEDURE — A9579 GAD-BASE MR CONTRAST NOS,1ML: HCPCS | Performed by: INTERNAL MEDICINE

## 2025-08-08 PROCEDURE — 6360000004 HC RX CONTRAST MEDICATION: Performed by: INTERNAL MEDICINE

## 2025-08-08 PROCEDURE — 70553 MRI BRAIN STEM W/O & W/DYE: CPT

## 2025-08-08 RX ORDER — OXYBUTYNIN CHLORIDE 5 MG/1
5 TABLET ORAL 2 TIMES DAILY
Qty: 60 TABLET | Refills: 0 | Status: SHIPPED | OUTPATIENT
Start: 2025-08-08

## 2025-08-08 RX ORDER — GADOTERIDOL 279.3 MG/ML
17 INJECTION INTRAVENOUS
Status: COMPLETED | OUTPATIENT
Start: 2025-08-08 | End: 2025-08-08

## 2025-08-08 RX ADMIN — GADOTERIDOL 17 ML: 279.3 INJECTION, SOLUTION INTRAVENOUS at 14:27

## 2025-08-20 PROBLEM — R51.9 CHRONIC NONINTRACTABLE HEADACHE: Status: ACTIVE | Noted: 2025-08-20

## 2025-08-20 PROBLEM — Z98.890 HISTORY OF LUMPECTOMY OF LEFT BREAST: Status: ACTIVE | Noted: 2025-08-20

## 2025-08-20 PROBLEM — Z80.9 FAMILY HISTORY OF CANCER: Status: ACTIVE | Noted: 2025-08-20

## 2025-08-20 PROBLEM — G89.29 CHRONIC NONINTRACTABLE HEADACHE: Status: ACTIVE | Noted: 2025-08-20

## 2025-08-22 DIAGNOSIS — R51.9 FREQUENT HEADACHES: ICD-10-CM

## 2025-08-22 DIAGNOSIS — I10 PRIMARY HYPERTENSION: Primary | ICD-10-CM

## 2025-08-22 RX ORDER — PROPRANOLOL HYDROCHLORIDE 10 MG/1
10 TABLET ORAL 2 TIMES DAILY
Qty: 60 TABLET | Refills: 0 | Status: SHIPPED | OUTPATIENT
Start: 2025-08-22 | End: 2025-09-21

## 2025-08-28 ENCOUNTER — OFFICE VISIT (OUTPATIENT)
Age: 75
End: 2025-08-28
Payer: MEDICARE

## 2025-08-28 VITALS
DIASTOLIC BLOOD PRESSURE: 88 MMHG | HEART RATE: 73 BPM | OXYGEN SATURATION: 96 % | SYSTOLIC BLOOD PRESSURE: 169 MMHG | RESPIRATION RATE: 18 BRPM | TEMPERATURE: 98 F | WEIGHT: 174 LBS | BODY MASS INDEX: 29.87 KG/M2

## 2025-08-28 DIAGNOSIS — Z80.9 FAMILY HISTORY OF CANCER: ICD-10-CM

## 2025-08-28 DIAGNOSIS — C50.412 MALIGNANT NEOPLASM OF UPPER-OUTER QUADRANT OF LEFT BREAST IN FEMALE, ESTROGEN RECEPTOR POSITIVE (HCC): Primary | ICD-10-CM

## 2025-08-28 DIAGNOSIS — E11.8 TYPE II DIABETES MELLITUS WITH COMPLICATION (HCC): ICD-10-CM

## 2025-08-28 DIAGNOSIS — Z85.3 PERSONAL HISTORY OF BREAST CANCER: ICD-10-CM

## 2025-08-28 DIAGNOSIS — Z98.890 HISTORY OF LUMPECTOMY OF LEFT BREAST: ICD-10-CM

## 2025-08-28 DIAGNOSIS — Z17.0 MALIGNANT NEOPLASM OF UPPER-OUTER QUADRANT OF LEFT BREAST IN FEMALE, ESTROGEN RECEPTOR POSITIVE (HCC): Primary | ICD-10-CM

## 2025-08-28 DIAGNOSIS — G47.33 SLEEP APNEA, OBSTRUCTIVE: ICD-10-CM

## 2025-08-28 DIAGNOSIS — G89.29 CHRONIC NONINTRACTABLE HEADACHE, UNSPECIFIED HEADACHE TYPE: ICD-10-CM

## 2025-08-28 DIAGNOSIS — R51.9 CHRONIC NONINTRACTABLE HEADACHE, UNSPECIFIED HEADACHE TYPE: ICD-10-CM

## 2025-08-28 DIAGNOSIS — Z79.811 USE OF ANASTROZOLE (ARIMIDEX): ICD-10-CM

## 2025-08-28 DIAGNOSIS — I10 ESSENTIAL (PRIMARY) HYPERTENSION: ICD-10-CM

## 2025-08-28 PROCEDURE — 3044F HG A1C LEVEL LT 7.0%: CPT | Performed by: NURSE PRACTITIONER

## 2025-08-28 PROCEDURE — G8428 CUR MEDS NOT DOCUMENT: HCPCS | Performed by: NURSE PRACTITIONER

## 2025-08-28 PROCEDURE — 3017F COLORECTAL CA SCREEN DOC REV: CPT | Performed by: NURSE PRACTITIONER

## 2025-08-28 PROCEDURE — 1090F PRES/ABSN URINE INCON ASSESS: CPT | Performed by: NURSE PRACTITIONER

## 2025-08-28 PROCEDURE — 2022F DILAT RTA XM EVC RTNOPTHY: CPT | Performed by: NURSE PRACTITIONER

## 2025-08-28 PROCEDURE — 1036F TOBACCO NON-USER: CPT | Performed by: NURSE PRACTITIONER

## 2025-08-28 PROCEDURE — G8417 CALC BMI ABV UP PARAM F/U: HCPCS | Performed by: NURSE PRACTITIONER

## 2025-08-28 PROCEDURE — 99213 OFFICE O/P EST LOW 20 MIN: CPT | Performed by: NURSE PRACTITIONER

## 2025-08-28 PROCEDURE — 3077F SYST BP >= 140 MM HG: CPT | Performed by: NURSE PRACTITIONER

## 2025-08-28 PROCEDURE — 3079F DIAST BP 80-89 MM HG: CPT | Performed by: NURSE PRACTITIONER

## 2025-08-28 PROCEDURE — G9899 SCRN MAM PERF RSLTS DOC: HCPCS | Performed by: NURSE PRACTITIONER

## 2025-08-28 PROCEDURE — 1125F AMNT PAIN NOTED PAIN PRSNT: CPT | Performed by: NURSE PRACTITIONER

## 2025-08-28 PROCEDURE — G8400 PT W/DXA NO RESULTS DOC: HCPCS | Performed by: NURSE PRACTITIONER

## 2025-08-28 PROCEDURE — 1123F ACP DISCUSS/DSCN MKR DOCD: CPT | Performed by: NURSE PRACTITIONER

## 2025-09-05 DIAGNOSIS — N39.498 OTHER URINARY INCONTINENCE: ICD-10-CM

## 2025-09-05 RX ORDER — OXYBUTYNIN CHLORIDE 5 MG/1
5 TABLET ORAL 2 TIMES DAILY
Qty: 60 TABLET | Refills: 0 | Status: SHIPPED | OUTPATIENT
Start: 2025-09-05

## (undated) DEVICE — GOWN,AURORA,NON-REINFORCED,2XL: Brand: MEDLINE

## (undated) DEVICE — BLADE ES ELASTOMERIC COAT INSUL DURABLE BEND UPTO 90DEG

## (undated) DEVICE — LIQUIBAND RAPID ADHESIVE 36/CS 0.8ML: Brand: MEDLINE

## (undated) DEVICE — SYRINGE MED 10ML LUERLOCK TIP W/O SFTY DISP

## (undated) DEVICE — PENCIL SMK EVAC ALL IN 1 DSGN ENH VISIBILITY IMPROVED AIR

## (undated) DEVICE — GARMENT,MEDLINE,DVT,INT,CALF,MED, GEN2: Brand: MEDLINE

## (undated) DEVICE — SEALER LAP SM L18.8CM OPN JAW HAND/FOOT SWCH FORCETRIAD

## (undated) DEVICE — GLOVE ORANGE PI 7 1/2   MSG9075

## (undated) DEVICE — PLASTICS CHEST BREAST ASU: Brand: MEDLINE INDUSTRIES, INC.

## (undated) DEVICE — SOLUTION IRRIG 1000ML 09% SOD CHL USP PIC PLAS CONTAINER

## (undated) DEVICE — COVER US PRB W5XL96IN LTX W/ GEL

## (undated) DEVICE — SUTURE VICRYL + SZ 3-0 L27IN ABSRB UD L26MM SH 1/2 CIR VCP416H

## (undated) DEVICE — SUTURE PERMA-HAND SZ 2-0 L30IN NONABSORBABLE BLK L26MM SH K833H

## (undated) DEVICE — SUTURE MONOCRYL + SZ 4-0 L27IN ABSRB UD L19MM PS-2 3/8 CIR MCP426H

## (undated) DEVICE — HYPODERMIC SAFETY NEEDLE: Brand: MAGELLAN